# Patient Record
Sex: FEMALE | Race: WHITE | Employment: FULL TIME | URBAN - METROPOLITAN AREA
[De-identification: names, ages, dates, MRNs, and addresses within clinical notes are randomized per-mention and may not be internally consistent; named-entity substitution may affect disease eponyms.]

---

## 2017-03-01 ENCOUNTER — TELEPHONE (OUTPATIENT)
Dept: INTERNAL MEDICINE CLINIC | Age: 67
End: 2017-03-01

## 2017-03-07 ENCOUNTER — OFFICE VISIT (OUTPATIENT)
Dept: INTERNAL MEDICINE CLINIC | Age: 67
End: 2017-03-07

## 2017-03-07 VITALS
DIASTOLIC BLOOD PRESSURE: 78 MMHG | HEART RATE: 75 BPM | RESPIRATION RATE: 16 BRPM | HEIGHT: 66 IN | OXYGEN SATURATION: 98 % | SYSTOLIC BLOOD PRESSURE: 116 MMHG | BODY MASS INDEX: 32.78 KG/M2 | WEIGHT: 204 LBS

## 2017-03-07 DIAGNOSIS — E78.2 MIXED HYPERLIPIDEMIA: Chronic | ICD-10-CM

## 2017-03-07 DIAGNOSIS — I10 ESSENTIAL HYPERTENSION: Chronic | ICD-10-CM

## 2017-03-07 PROCEDURE — 90732 PPSV23 VACC 2 YRS+ SUBQ/IM: CPT | Performed by: INTERNAL MEDICINE

## 2017-03-07 PROCEDURE — G0009 ADMIN PNEUMOCOCCAL VACCINE: HCPCS | Performed by: INTERNAL MEDICINE

## 2017-03-07 PROCEDURE — 99214 OFFICE O/P EST MOD 30 MIN: CPT | Performed by: INTERNAL MEDICINE

## 2017-03-07 RX ORDER — PRAVASTATIN SODIUM 40 MG
TABLET ORAL
Qty: 90 TABLET | Refills: 3 | Status: SHIPPED | OUTPATIENT
Start: 2017-03-07 | End: 2018-03-09 | Stop reason: SDUPTHER

## 2017-03-07 RX ORDER — LISINOPRIL AND HYDROCHLOROTHIAZIDE 20; 12.5 MG/1; MG/1
TABLET ORAL
Qty: 90 TABLET | Refills: 3 | Status: SHIPPED | OUTPATIENT
Start: 2017-03-07 | End: 2018-03-09 | Stop reason: SDUPTHER

## 2017-03-13 ENCOUNTER — TELEPHONE (OUTPATIENT)
Dept: INTERNAL MEDICINE CLINIC | Age: 67
End: 2017-03-13

## 2017-03-14 ENCOUNTER — NURSE ONLY (OUTPATIENT)
Dept: INTERNAL MEDICINE CLINIC | Age: 67
End: 2017-03-14

## 2017-03-14 DIAGNOSIS — E78.2 MIXED HYPERLIPIDEMIA: Primary | Chronic | ICD-10-CM

## 2017-03-14 DIAGNOSIS — I10 ESSENTIAL HYPERTENSION: Chronic | ICD-10-CM

## 2017-03-14 LAB
A/G RATIO: 1.8 (ref 1.1–2.2)
ALBUMIN SERPL-MCNC: 4.1 G/DL (ref 3.4–5)
ALP BLD-CCNC: 65 U/L (ref 40–129)
ALT SERPL-CCNC: 10 U/L (ref 10–40)
ANION GAP SERPL CALCULATED.3IONS-SCNC: 12 MMOL/L (ref 3–16)
AST SERPL-CCNC: 11 U/L (ref 15–37)
BASOPHILS ABSOLUTE: 0 K/UL (ref 0–0.2)
BASOPHILS RELATIVE PERCENT: 0.6 %
BILIRUB SERPL-MCNC: 0.5 MG/DL (ref 0–1)
BUN BLDV-MCNC: 15 MG/DL (ref 7–20)
CALCIUM SERPL-MCNC: 9.2 MG/DL (ref 8.3–10.6)
CHLORIDE BLD-SCNC: 102 MMOL/L (ref 99–110)
CHOLESTEROL, TOTAL: 192 MG/DL (ref 0–199)
CO2: 28 MMOL/L (ref 21–32)
CREAT SERPL-MCNC: 1 MG/DL (ref 0.6–1.2)
EOSINOPHILS ABSOLUTE: 0.2 K/UL (ref 0–0.6)
EOSINOPHILS RELATIVE PERCENT: 4.3 %
GFR AFRICAN AMERICAN: >60
GFR NON-AFRICAN AMERICAN: 55
GLOBULIN: 2.3 G/DL
GLUCOSE BLD-MCNC: 94 MG/DL (ref 70–99)
HCT VFR BLD CALC: 41.6 % (ref 36–48)
HDLC SERPL-MCNC: 64 MG/DL (ref 40–60)
HEMOGLOBIN: 13.5 G/DL (ref 12–16)
LDL CHOLESTEROL CALCULATED: 94 MG/DL
LYMPHOCYTES ABSOLUTE: 1.3 K/UL (ref 1–5.1)
LYMPHOCYTES RELATIVE PERCENT: 35.5 %
MCH RBC QN AUTO: 31.6 PG (ref 26–34)
MCHC RBC AUTO-ENTMCNC: 32.6 G/DL (ref 31–36)
MCV RBC AUTO: 96.9 FL (ref 80–100)
MONOCYTES ABSOLUTE: 0.3 K/UL (ref 0–1.3)
MONOCYTES RELATIVE PERCENT: 8.3 %
NEUTROPHILS ABSOLUTE: 1.9 K/UL (ref 1.7–7.7)
NEUTROPHILS RELATIVE PERCENT: 51.3 %
PDW BLD-RTO: 13 % (ref 12.4–15.4)
PLATELET # BLD: 273 K/UL (ref 135–450)
PMV BLD AUTO: 8.5 FL (ref 5–10.5)
POTASSIUM SERPL-SCNC: 4.3 MMOL/L (ref 3.5–5.1)
RBC # BLD: 4.29 M/UL (ref 4–5.2)
SODIUM BLD-SCNC: 142 MMOL/L (ref 136–145)
TOTAL PROTEIN: 6.4 G/DL (ref 6.4–8.2)
TRIGL SERPL-MCNC: 169 MG/DL (ref 0–150)
TSH REFLEX: 1.59 UIU/ML (ref 0.27–4.2)
VLDLC SERPL CALC-MCNC: 34 MG/DL
WBC # BLD: 3.7 K/UL (ref 4–11)

## 2017-03-14 PROCEDURE — 36415 COLL VENOUS BLD VENIPUNCTURE: CPT | Performed by: INTERNAL MEDICINE

## 2017-12-11 ENCOUNTER — OFFICE VISIT (OUTPATIENT)
Dept: INTERNAL MEDICINE CLINIC | Age: 67
End: 2017-12-11

## 2017-12-11 VITALS — TEMPERATURE: 98.1 F | BODY MASS INDEX: 32.78 KG/M2 | RESPIRATION RATE: 16 BRPM | WEIGHT: 204 LBS | HEIGHT: 66 IN

## 2017-12-11 DIAGNOSIS — J20.9 BRONCHITIS, ACUTE, WITH BRONCHOSPASM: Primary | ICD-10-CM

## 2017-12-11 PROCEDURE — G8484 FLU IMMUNIZE NO ADMIN: HCPCS | Performed by: INTERNAL MEDICINE

## 2017-12-11 PROCEDURE — 99214 OFFICE O/P EST MOD 30 MIN: CPT | Performed by: INTERNAL MEDICINE

## 2017-12-11 PROCEDURE — G8427 DOCREV CUR MEDS BY ELIG CLIN: HCPCS | Performed by: INTERNAL MEDICINE

## 2017-12-11 PROCEDURE — 3014F SCREEN MAMMO DOC REV: CPT | Performed by: INTERNAL MEDICINE

## 2017-12-11 PROCEDURE — 3017F COLORECTAL CA SCREEN DOC REV: CPT | Performed by: INTERNAL MEDICINE

## 2017-12-11 PROCEDURE — 4040F PNEUMOC VAC/ADMIN/RCVD: CPT | Performed by: INTERNAL MEDICINE

## 2017-12-11 PROCEDURE — G8417 CALC BMI ABV UP PARAM F/U: HCPCS | Performed by: INTERNAL MEDICINE

## 2017-12-11 PROCEDURE — G8400 PT W/DXA NO RESULTS DOC: HCPCS | Performed by: INTERNAL MEDICINE

## 2017-12-11 PROCEDURE — 1123F ACP DISCUSS/DSCN MKR DOCD: CPT | Performed by: INTERNAL MEDICINE

## 2017-12-11 PROCEDURE — 1036F TOBACCO NON-USER: CPT | Performed by: INTERNAL MEDICINE

## 2017-12-11 PROCEDURE — 1090F PRES/ABSN URINE INCON ASSESS: CPT | Performed by: INTERNAL MEDICINE

## 2017-12-11 RX ORDER — METHYLPREDNISOLONE 4 MG/1
TABLET ORAL
Qty: 1 KIT | Refills: 0 | Status: SHIPPED | OUTPATIENT
Start: 2017-12-11 | End: 2017-12-17

## 2017-12-11 RX ORDER — GUAIFENESIN AND PSEUDOEPHEDRINE HCL 1200; 120 MG/1; MG/1
TABLET, EXTENDED RELEASE ORAL
Qty: 20 TABLET | Refills: 0 | Status: SHIPPED | OUTPATIENT
Start: 2017-12-11 | End: 2019-03-22

## 2017-12-11 RX ORDER — AZITHROMYCIN 250 MG/1
TABLET, FILM COATED ORAL
Qty: 1 PACKET | Refills: 0 | Status: SHIPPED | OUTPATIENT
Start: 2017-12-11 | End: 2017-12-21

## 2017-12-11 ASSESSMENT — PATIENT HEALTH QUESTIONNAIRE - PHQ9
1. LITTLE INTEREST OR PLEASURE IN DOING THINGS: 0
2. FEELING DOWN, DEPRESSED OR HOPELESS: 0
SUM OF ALL RESPONSES TO PHQ QUESTIONS 1-9: 0
SUM OF ALL RESPONSES TO PHQ9 QUESTIONS 1 & 2: 0

## 2017-12-11 NOTE — PROGRESS NOTES
Subjective  Patient complains of deep cough, congestion and phlegm production for the last several days. Also has had shortness of breath and wheezing. Symptoms get worse in the morning and at night and get better as the day progresses. The illness shows no sign of improvement. Complains of chest pressure and pain during a coughing episode. Has had fever and chills. No chest pain. Very tired and fatigued. Has had body aches. No nausea or emesis. No pedal edema. No dizziness. No acid reflux. Denies sore throat or otalgias. Tried over the counter medications with not much improvement. Has also tried inhalers with some help. Patient has been exposed to some people at work/home with similar symptoms. Objective  Temp 98.1 °F (36.7 °C) (Oral)   Resp 16   Ht 5' 6\" (1.676 m)   Wt 204 lb (92.5 kg)   BMI 32.93 kg/m²    Patient  looks ill to a mild to moderate extent. Visibly short of breath. Has audible wheezes. has some sinus tenderness. Ears show tympanic membrane congestion. Pharynx shows some posterior congestion and some postnasal drainage. Has some mild tender neck lymphadenopathy. Examination of the lungs reveal mildly labored respirations. Also has diffuse wheezing and rhonchi. No crackles heard. no rub or fremitus noted. Breath sounds have lowered intensity and has a prolonged expiratory phase. Heart sounds are normal, regular rate and rhythm, no murmur, gallop or rub noted. Apical impulse is in the left fifth ICS and appears normal.    Assessment  Bronchitis  Bronchospasm  Plan  Will start antibiotics, a tapering course of steroids and inhalers. Will start bronchodilators. Also recommend that the patient start some otc decongestants. Asked the patient to consume a lot of fluids, avoid greasy foods and smoke. Allergies could be playing a role in patients symptoms. Trying otc allergy meds like claritin, allegra or zyrtec would also be an option. Call us if symptoms do not improve in 2-3 days.

## 2018-03-13 RX ORDER — PRAVASTATIN SODIUM 40 MG
TABLET ORAL
Qty: 90 TABLET | Refills: 3 | Status: SHIPPED | OUTPATIENT
Start: 2018-03-13 | End: 2019-03-22 | Stop reason: SDUPTHER

## 2018-03-13 RX ORDER — LISINOPRIL AND HYDROCHLOROTHIAZIDE 20; 12.5 MG/1; MG/1
TABLET ORAL
Qty: 90 TABLET | Refills: 3 | Status: SHIPPED | OUTPATIENT
Start: 2018-03-13 | End: 2019-03-22 | Stop reason: SDUPTHER

## 2019-03-12 RX ORDER — PRAVASTATIN SODIUM 40 MG
TABLET ORAL
Qty: 90 TABLET | Refills: 3 | OUTPATIENT
Start: 2019-03-12

## 2019-03-12 RX ORDER — LISINOPRIL AND HYDROCHLOROTHIAZIDE 20; 12.5 MG/1; MG/1
TABLET ORAL
Qty: 90 TABLET | Refills: 3 | OUTPATIENT
Start: 2019-03-12

## 2019-03-15 RX ORDER — PRAVASTATIN SODIUM 40 MG
TABLET ORAL
Qty: 90 TABLET | OUTPATIENT
Start: 2019-03-15

## 2019-03-15 RX ORDER — LISINOPRIL AND HYDROCHLOROTHIAZIDE 20; 12.5 MG/1; MG/1
TABLET ORAL
Qty: 90 TABLET | OUTPATIENT
Start: 2019-03-15

## 2019-03-22 ENCOUNTER — OFFICE VISIT (OUTPATIENT)
Dept: INTERNAL MEDICINE CLINIC | Age: 69
End: 2019-03-22
Payer: MEDICARE

## 2019-03-22 VITALS
RESPIRATION RATE: 16 BRPM | HEIGHT: 66 IN | SYSTOLIC BLOOD PRESSURE: 122 MMHG | BODY MASS INDEX: 33.11 KG/M2 | WEIGHT: 206 LBS | HEART RATE: 68 BPM | DIASTOLIC BLOOD PRESSURE: 72 MMHG

## 2019-03-22 DIAGNOSIS — I10 ESSENTIAL HYPERTENSION: ICD-10-CM

## 2019-03-22 DIAGNOSIS — B00.1 FEVER BLISTER: ICD-10-CM

## 2019-03-22 DIAGNOSIS — Z85.038 HISTORY OF COLON CANCER: ICD-10-CM

## 2019-03-22 DIAGNOSIS — E78.2 MIXED HYPERLIPIDEMIA: ICD-10-CM

## 2019-03-22 DIAGNOSIS — Z76.89 ENCOUNTER TO ESTABLISH CARE: Primary | ICD-10-CM

## 2019-03-22 PROCEDURE — G8484 FLU IMMUNIZE NO ADMIN: HCPCS | Performed by: NURSE PRACTITIONER

## 2019-03-22 PROCEDURE — 1090F PRES/ABSN URINE INCON ASSESS: CPT | Performed by: NURSE PRACTITIONER

## 2019-03-22 PROCEDURE — 1101F PT FALLS ASSESS-DOCD LE1/YR: CPT | Performed by: NURSE PRACTITIONER

## 2019-03-22 PROCEDURE — 4040F PNEUMOC VAC/ADMIN/RCVD: CPT | Performed by: NURSE PRACTITIONER

## 2019-03-22 PROCEDURE — G8400 PT W/DXA NO RESULTS DOC: HCPCS | Performed by: NURSE PRACTITIONER

## 2019-03-22 PROCEDURE — G8427 DOCREV CUR MEDS BY ELIG CLIN: HCPCS | Performed by: NURSE PRACTITIONER

## 2019-03-22 PROCEDURE — G8417 CALC BMI ABV UP PARAM F/U: HCPCS | Performed by: NURSE PRACTITIONER

## 2019-03-22 PROCEDURE — 99205 OFFICE O/P NEW HI 60 MIN: CPT | Performed by: NURSE PRACTITIONER

## 2019-03-22 PROCEDURE — 3017F COLORECTAL CA SCREEN DOC REV: CPT | Performed by: NURSE PRACTITIONER

## 2019-03-22 PROCEDURE — 1123F ACP DISCUSS/DSCN MKR DOCD: CPT | Performed by: NURSE PRACTITIONER

## 2019-03-22 PROCEDURE — 1036F TOBACCO NON-USER: CPT | Performed by: NURSE PRACTITIONER

## 2019-03-22 RX ORDER — PRAVASTATIN SODIUM 40 MG
TABLET ORAL
Qty: 90 TABLET | Refills: 3 | Status: SHIPPED | OUTPATIENT
Start: 2019-03-22 | End: 2020-06-04

## 2019-03-22 RX ORDER — VALACYCLOVIR HYDROCHLORIDE 1 G/1
1000 TABLET, FILM COATED ORAL 2 TIMES DAILY PRN
Qty: 14 TABLET | Refills: 0 | Status: SHIPPED | OUTPATIENT
Start: 2019-03-22 | End: 2019-03-22 | Stop reason: SDUPTHER

## 2019-03-22 RX ORDER — LISINOPRIL AND HYDROCHLOROTHIAZIDE 20; 12.5 MG/1; MG/1
TABLET ORAL
Qty: 90 TABLET | Refills: 3 | Status: SHIPPED | OUTPATIENT
Start: 2019-03-22 | End: 2020-06-04

## 2019-03-22 RX ORDER — VALACYCLOVIR HYDROCHLORIDE 1 G/1
1000 TABLET, FILM COATED ORAL 2 TIMES DAILY PRN
Qty: 14 TABLET | Refills: 0 | Status: SHIPPED | OUTPATIENT
Start: 2019-03-22 | End: 2019-03-29

## 2019-03-22 RX ORDER — LISINOPRIL AND HYDROCHLOROTHIAZIDE 20; 12.5 MG/1; MG/1
TABLET ORAL
Qty: 90 TABLET | Refills: 3 | Status: SHIPPED | OUTPATIENT
Start: 2019-03-22 | End: 2019-03-22 | Stop reason: SDUPTHER

## 2019-03-22 ASSESSMENT — ENCOUNTER SYMPTOMS
PHOTOPHOBIA: 0
EYE PAIN: 0
SORE THROAT: 0
SHORTNESS OF BREATH: 0
ABDOMINAL PAIN: 0
NAUSEA: 0
WHEEZING: 0
CHEST TIGHTNESS: 0
SINUS PAIN: 0
BACK PAIN: 0
TROUBLE SWALLOWING: 0
ORTHOPNEA: 0
COUGH: 0
CONSTIPATION: 0
VOMITING: 0
DIARRHEA: 0
SINUS PRESSURE: 0
BLURRED VISION: 0

## 2019-03-22 ASSESSMENT — PATIENT HEALTH QUESTIONNAIRE - PHQ9
1. LITTLE INTEREST OR PLEASURE IN DOING THINGS: 0
SUM OF ALL RESPONSES TO PHQ9 QUESTIONS 1 & 2: 0
2. FEELING DOWN, DEPRESSED OR HOPELESS: 0
SUM OF ALL RESPONSES TO PHQ QUESTIONS 1-9: 0
SUM OF ALL RESPONSES TO PHQ QUESTIONS 1-9: 0

## 2019-03-26 ENCOUNTER — NURSE ONLY (OUTPATIENT)
Dept: INTERNAL MEDICINE CLINIC | Age: 69
End: 2019-03-26
Payer: MEDICARE

## 2019-03-26 DIAGNOSIS — I10 ESSENTIAL HYPERTENSION: ICD-10-CM

## 2019-03-26 DIAGNOSIS — E78.2 MIXED HYPERLIPIDEMIA: ICD-10-CM

## 2019-03-26 LAB
A/G RATIO: 1.6 (ref 1.1–2.2)
ALBUMIN SERPL-MCNC: 4.1 G/DL (ref 3.4–5)
ALP BLD-CCNC: 57 U/L (ref 40–129)
ALT SERPL-CCNC: 14 U/L (ref 10–40)
ANION GAP SERPL CALCULATED.3IONS-SCNC: 10 MMOL/L (ref 3–16)
AST SERPL-CCNC: 14 U/L (ref 15–37)
BILIRUB SERPL-MCNC: 0.6 MG/DL (ref 0–1)
BUN BLDV-MCNC: 10 MG/DL (ref 7–20)
CALCIUM SERPL-MCNC: 9.4 MG/DL (ref 8.3–10.6)
CHLORIDE BLD-SCNC: 103 MMOL/L (ref 99–110)
CHOLESTEROL, TOTAL: 187 MG/DL (ref 0–199)
CO2: 29 MMOL/L (ref 21–32)
CREAT SERPL-MCNC: 0.8 MG/DL (ref 0.6–1.2)
GFR AFRICAN AMERICAN: >60
GFR NON-AFRICAN AMERICAN: >60
GLOBULIN: 2.5 G/DL
GLUCOSE BLD-MCNC: 100 MG/DL (ref 70–99)
HCT VFR BLD CALC: 40.7 % (ref 36–48)
HDLC SERPL-MCNC: 67 MG/DL (ref 40–60)
HEMOGLOBIN: 13.6 G/DL (ref 12–16)
LDL CHOLESTEROL CALCULATED: 95 MG/DL
MCH RBC QN AUTO: 32.3 PG (ref 26–34)
MCHC RBC AUTO-ENTMCNC: 33.3 G/DL (ref 31–36)
MCV RBC AUTO: 96.8 FL (ref 80–100)
PDW BLD-RTO: 13.5 % (ref 12.4–15.4)
PLATELET # BLD: 277 K/UL (ref 135–450)
PMV BLD AUTO: 8.2 FL (ref 5–10.5)
POTASSIUM SERPL-SCNC: 4.2 MMOL/L (ref 3.5–5.1)
RBC # BLD: 4.21 M/UL (ref 4–5.2)
SODIUM BLD-SCNC: 142 MMOL/L (ref 136–145)
TOTAL PROTEIN: 6.6 G/DL (ref 6.4–8.2)
TRIGL SERPL-MCNC: 125 MG/DL (ref 0–150)
TSH REFLEX FT4: 1.59 UIU/ML (ref 0.27–4.2)
VLDLC SERPL CALC-MCNC: 25 MG/DL
WBC # BLD: 3.7 K/UL (ref 4–11)

## 2019-03-26 PROCEDURE — 36415 COLL VENOUS BLD VENIPUNCTURE: CPT | Performed by: NURSE PRACTITIONER

## 2019-05-08 ENCOUNTER — OFFICE VISIT (OUTPATIENT)
Dept: INTERNAL MEDICINE CLINIC | Age: 69
End: 2019-05-08
Payer: MEDICARE

## 2019-05-08 VITALS
HEART RATE: 76 BPM | HEIGHT: 66 IN | RESPIRATION RATE: 12 BRPM | OXYGEN SATURATION: 100 % | SYSTOLIC BLOOD PRESSURE: 113 MMHG | TEMPERATURE: 98 F | DIASTOLIC BLOOD PRESSURE: 73 MMHG | BODY MASS INDEX: 32.47 KG/M2 | WEIGHT: 202 LBS

## 2019-05-08 DIAGNOSIS — S60.10XA CONTUSION OF NAIL BED OF FINGER, INITIAL ENCOUNTER: Primary | ICD-10-CM

## 2019-05-08 PROCEDURE — G8427 DOCREV CUR MEDS BY ELIG CLIN: HCPCS | Performed by: NURSE PRACTITIONER

## 2019-05-08 PROCEDURE — G8417 CALC BMI ABV UP PARAM F/U: HCPCS | Performed by: NURSE PRACTITIONER

## 2019-05-08 PROCEDURE — 99213 OFFICE O/P EST LOW 20 MIN: CPT | Performed by: NURSE PRACTITIONER

## 2019-05-08 PROCEDURE — 1123F ACP DISCUSS/DSCN MKR DOCD: CPT | Performed by: NURSE PRACTITIONER

## 2019-05-08 PROCEDURE — 4040F PNEUMOC VAC/ADMIN/RCVD: CPT | Performed by: NURSE PRACTITIONER

## 2019-05-08 PROCEDURE — 3017F COLORECTAL CA SCREEN DOC REV: CPT | Performed by: NURSE PRACTITIONER

## 2019-05-08 PROCEDURE — G8400 PT W/DXA NO RESULTS DOC: HCPCS | Performed by: NURSE PRACTITIONER

## 2019-05-08 PROCEDURE — 1090F PRES/ABSN URINE INCON ASSESS: CPT | Performed by: NURSE PRACTITIONER

## 2019-05-08 PROCEDURE — 1036F TOBACCO NON-USER: CPT | Performed by: NURSE PRACTITIONER

## 2019-05-08 ASSESSMENT — ENCOUNTER SYMPTOMS
COUGH: 0
CHEST TIGHTNESS: 0
COLOR CHANGE: 0
CHOKING: 0
SHORTNESS OF BREATH: 0

## 2019-05-08 NOTE — PROGRESS NOTES
BP Readings from Last 3 Encounters:   05/08/19 113/73   03/22/19 122/72   03/07/17 116/78     Wt Readings from Last 3 Encounters:   05/08/19 202 lb (91.6 kg)   03/22/19 206 lb (93.4 kg)   12/11/17 204 lb (92.5 kg)      Body mass index is 32.6 kg/m². No results found for this visit on 05/08/19. Lab Review   Nurse Only on 03/26/2019   Component Date Value    TSH Reflex FT4 03/26/2019 1.59     Sodium 03/26/2019 142     Potassium 03/26/2019 4.2     Chloride 03/26/2019 103     CO2 03/26/2019 29     Anion Gap 03/26/2019 10     Glucose 03/26/2019 100*    BUN 03/26/2019 10     CREATININE 03/26/2019 0.8     GFR Non- 03/26/2019 >60     GFR  03/26/2019 >60     Calcium 03/26/2019 9.4     Total Protein 03/26/2019 6.6     Alb 03/26/2019 4.1     Albumin/Globulin Ratio 03/26/2019 1.6     Total Bilirubin 03/26/2019 0.6     Alkaline Phosphatase 03/26/2019 57     ALT 03/26/2019 14     AST 03/26/2019 14*    Globulin 03/26/2019 2.5     WBC 03/26/2019 3.7*    RBC 03/26/2019 4.21     Hemoglobin 03/26/2019 13.6     Hematocrit 03/26/2019 40.7     MCV 03/26/2019 96.8     MCH 03/26/2019 32.3     MCHC 03/26/2019 33.3     RDW 03/26/2019 13.5     Platelets 52/94/2763 277     MPV 03/26/2019 8.2     Cholesterol, Total 03/26/2019 187     Triglycerides 03/26/2019 125     HDL 03/26/2019 67*    LDL Calculated 03/26/2019 95     VLDL Cholesterol Calcula* 03/26/2019 25          Physical Exam   Constitutional: She appears well-developed and well-nourished. Cardiovascular: Normal rate. Pulmonary/Chest: Effort normal and breath sounds normal.   Skin: Skin is warm and dry. No rash noted. No erythema. No pallor. Vertical ridges to middle finger, left hand. Non-tender, non-discolored         Assessment/Plan   Arsh Most was seen today for nail problem.   Diagnoses and all orders for this visit:    Contusion of nail bed of finger, initial encounter  -vertical ridge, left hand, middle finger  -CBC reviewed, WNL  -patient states \"improving\", instructed to monitor for drainage.   -suspect possible fungus related, however; patient states, Improving, therefore will monitor and hold off on creams at this time   -patient has f/u Derm appt 07/2019    All questions addressed and answered. Patient agrees with plan of care. No problem-specific Assessment & Plan notes found for this encounter. No follow-ups on file.

## 2019-11-05 ENCOUNTER — HOSPITAL ENCOUNTER (OUTPATIENT)
Dept: WOMENS IMAGING | Age: 69
Discharge: HOME | End: 2019-11-05
Payer: MEDICARE

## 2019-11-05 DIAGNOSIS — Z12.31 BREAST CANCER SCREENING BY MAMMOGRAM: ICD-10-CM

## 2019-11-05 PROCEDURE — 77080 DXA BONE DENSITY AXIAL: CPT

## 2020-06-04 RX ORDER — PRAVASTATIN SODIUM 40 MG
TABLET ORAL
Qty: 90 TABLET | Refills: 0 | Status: SHIPPED | OUTPATIENT
Start: 2020-06-04 | End: 2020-08-25 | Stop reason: SDUPTHER

## 2020-06-04 RX ORDER — LISINOPRIL AND HYDROCHLOROTHIAZIDE 20; 12.5 MG/1; MG/1
TABLET ORAL
Qty: 90 TABLET | Refills: 0 | Status: SHIPPED | OUTPATIENT
Start: 2020-06-04 | End: 2020-08-25 | Stop reason: SDUPTHER

## 2020-08-25 ENCOUNTER — OFFICE VISIT (OUTPATIENT)
Dept: INTERNAL MEDICINE CLINIC | Age: 70
End: 2020-08-25
Payer: MEDICARE

## 2020-08-25 VITALS
HEART RATE: 82 BPM | OXYGEN SATURATION: 98 % | BODY MASS INDEX: 33.31 KG/M2 | SYSTOLIC BLOOD PRESSURE: 114 MMHG | WEIGHT: 206.4 LBS | TEMPERATURE: 97.9 F | RESPIRATION RATE: 18 BRPM | DIASTOLIC BLOOD PRESSURE: 78 MMHG

## 2020-08-25 PROBLEM — E66.09 CLASS 1 OBESITY DUE TO EXCESS CALORIES WITHOUT SERIOUS COMORBIDITY WITH BODY MASS INDEX (BMI) OF 33.0 TO 33.9 IN ADULT: Status: ACTIVE | Noted: 2020-08-25

## 2020-08-25 PROBLEM — E66.811 CLASS 1 OBESITY DUE TO EXCESS CALORIES WITHOUT SERIOUS COMORBIDITY WITH BODY MASS INDEX (BMI) OF 33.0 TO 33.9 IN ADULT: Status: ACTIVE | Noted: 2020-08-25

## 2020-08-25 PROCEDURE — 99214 OFFICE O/P EST MOD 30 MIN: CPT | Performed by: INTERNAL MEDICINE

## 2020-08-25 PROCEDURE — 1090F PRES/ABSN URINE INCON ASSESS: CPT | Performed by: INTERNAL MEDICINE

## 2020-08-25 PROCEDURE — G8427 DOCREV CUR MEDS BY ELIG CLIN: HCPCS | Performed by: INTERNAL MEDICINE

## 2020-08-25 PROCEDURE — G8399 PT W/DXA RESULTS DOCUMENT: HCPCS | Performed by: INTERNAL MEDICINE

## 2020-08-25 PROCEDURE — 3017F COLORECTAL CA SCREEN DOC REV: CPT | Performed by: INTERNAL MEDICINE

## 2020-08-25 PROCEDURE — 1123F ACP DISCUSS/DSCN MKR DOCD: CPT | Performed by: INTERNAL MEDICINE

## 2020-08-25 PROCEDURE — G8417 CALC BMI ABV UP PARAM F/U: HCPCS | Performed by: INTERNAL MEDICINE

## 2020-08-25 PROCEDURE — 1036F TOBACCO NON-USER: CPT | Performed by: INTERNAL MEDICINE

## 2020-08-25 PROCEDURE — 4040F PNEUMOC VAC/ADMIN/RCVD: CPT | Performed by: INTERNAL MEDICINE

## 2020-08-25 RX ORDER — LISINOPRIL AND HYDROCHLOROTHIAZIDE 20; 12.5 MG/1; MG/1
TABLET ORAL
Qty: 90 TABLET | Refills: 3 | Status: SHIPPED | OUTPATIENT
Start: 2020-08-25 | End: 2021-03-23 | Stop reason: SDUPTHER

## 2020-08-25 RX ORDER — PRAVASTATIN SODIUM 40 MG
TABLET ORAL
Qty: 90 TABLET | Refills: 3 | Status: SHIPPED | OUTPATIENT
Start: 2020-08-25 | End: 2021-03-23 | Stop reason: SDUPTHER

## 2020-08-25 SDOH — ECONOMIC STABILITY: FOOD INSECURITY: WITHIN THE PAST 12 MONTHS, YOU WORRIED THAT YOUR FOOD WOULD RUN OUT BEFORE YOU GOT MONEY TO BUY MORE.: NEVER TRUE

## 2020-08-25 SDOH — ECONOMIC STABILITY: FOOD INSECURITY: WITHIN THE PAST 12 MONTHS, THE FOOD YOU BOUGHT JUST DIDN'T LAST AND YOU DIDN'T HAVE MONEY TO GET MORE.: NEVER TRUE

## 2020-08-25 SDOH — ECONOMIC STABILITY: INCOME INSECURITY: HOW HARD IS IT FOR YOU TO PAY FOR THE VERY BASICS LIKE FOOD, HOUSING, MEDICAL CARE, AND HEATING?: NOT HARD AT ALL

## 2020-08-25 SDOH — ECONOMIC STABILITY: TRANSPORTATION INSECURITY
IN THE PAST 12 MONTHS, HAS LACK OF TRANSPORTATION KEPT YOU FROM MEETINGS, WORK, OR FROM GETTING THINGS NEEDED FOR DAILY LIVING?: NO

## 2020-08-25 SDOH — ECONOMIC STABILITY: TRANSPORTATION INSECURITY
IN THE PAST 12 MONTHS, HAS THE LACK OF TRANSPORTATION KEPT YOU FROM MEDICAL APPOINTMENTS OR FROM GETTING MEDICATIONS?: NO

## 2020-08-25 ASSESSMENT — PATIENT HEALTH QUESTIONNAIRE - PHQ9
1. LITTLE INTEREST OR PLEASURE IN DOING THINGS: 0
SUM OF ALL RESPONSES TO PHQ QUESTIONS 1-9: 0
2. FEELING DOWN, DEPRESSED OR HOPELESS: 0
SUM OF ALL RESPONSES TO PHQ9 QUESTIONS 1 & 2: 0
SUM OF ALL RESPONSES TO PHQ QUESTIONS 1-9: 0

## 2020-08-25 ASSESSMENT — ENCOUNTER SYMPTOMS
DIARRHEA: 0
SINUS PAIN: 0
EYE DISCHARGE: 0
EYE PAIN: 0
WHEEZING: 0
NAUSEA: 0
BLOOD IN STOOL: 0
CHEST TIGHTNESS: 0
VOMITING: 0
BACK PAIN: 0
ABDOMINAL DISTENTION: 0
COUGH: 0
CONSTIPATION: 0
SHORTNESS OF BREATH: 0
ABDOMINAL PAIN: 0
PHOTOPHOBIA: 0
RHINORRHEA: 0

## 2020-08-25 NOTE — PROGRESS NOTES
Allie Rivera MD        Allergies   Allergen Reactions    Sulfa Antibiotics        Past Medical History:   Diagnosis Date    Colon cancer ([address] 10/22/2012    Colon polyp 10/22/2012    Essential hypertension 3/27/2015    HTN (hypertension) 3/27/2015    Hyperlipidemia 9/16/2011    Mixed hyperlipidemia 9/16/2011       Past Surgical History:   Procedure Laterality Date    APPENDECTOMY      COLONOSCOPY      2017       Social History     Tobacco Use    Smoking status: Never Smoker    Smokeless tobacco: Never Used   Substance Use Topics    Alcohol use: No    Drug use: No         History reviewed. No pertinent family history. Vitals:    08/25/20 1437   BP: 114/78   Site: Right Upper Arm   Position: Sitting   Cuff Size: Large Adult   Pulse: 82   Resp: 18   Temp: 97.9 °F (36.6 °C)   SpO2: 98%   Weight: 206 lb 6.4 oz (93.6 kg)       Estimated body mass index is 33.31 kg/m² as calculated from the following:    Height as of 5/8/19: 5' 6\" (1.676 m). Weight as of this encounter: 206 lb 6.4 oz (93.6 kg). Physical Exam  Vitals signs and nursing note reviewed. Constitutional:       Appearance: Normal appearance. She is obese. HENT:      Head: Normocephalic and atraumatic. Right Ear: Tympanic membrane normal.      Left Ear: Tympanic membrane and ear canal normal.      Nose: Nose normal.      Mouth/Throat:      Mouth: Mucous membranes are dry. Eyes:      Extraocular Movements: Extraocular movements intact. Neck:      Musculoskeletal: Normal range of motion and neck supple. Cardiovascular:      Rate and Rhythm: Normal rate and regular rhythm. Pulses: Normal pulses. Heart sounds: Normal heart sounds. Pulmonary:      Effort: Pulmonary effort is normal. No respiratory distress. Breath sounds: Normal breath sounds. No wheezing. Abdominal:      General: Bowel sounds are normal.      Palpations: Abdomen is soft. Genitourinary:     General: Normal vulva. Vagina: No vaginal discharge. Musculoskeletal: Normal range of motion. General: No swelling or tenderness. Comments: Trace edema   Skin:     General: Skin is warm and dry. Coloration: Skin is not jaundiced or pale. Findings: No rash. Neurological:      General: No focal deficit present. Mental Status: She is alert and oriented to person, place, and time. Sensory: No sensory deficit. Motor: No weakness. Psychiatric:         Mood and Affect: Mood normal.         Behavior: Behavior normal.         ASSESSMENT/PLAN:  1. Establishing care with new doctor, encounter for  Pt has not had an AWV yet due to the change in the PCP and the COVID -19 situation. Will schedule for one    2. Mixed hyperlipidemia    - Lipid, Fasting; Future    3. Essential hypertension  Will continue on the lisinopril/HCTZ same dose  - BASIC METABOLIC PANEL; Future    4. Class 1 obesity due to excess calories without serious comorbidity with body mass index (BMI) of 33.0 to 33.9 in adult  counseled regarding wt loss    5. Encounter for screening for diabetes mellitus  Due to obesity will screen for DM as well  - HEMOGLOBIN A1C; Future      Orders Placed This Encounter   Medications    lisinopril-hydroCHLOROthiazide (PRINZIDE;ZESTORETIC) 20-12.5 MG per tablet     Sig: Take 1 tablet daily by mouth     Dispense:  90 tablet     Refill:  3    pravastatin (PRAVACHOL) 40 MG tablet     Sig: Take 1 tablet daily by mouth     Dispense:  90 tablet     Refill:  3       Return in about 4 weeks (around 9/22/2020) for AWV.

## 2020-09-22 ENCOUNTER — OFFICE VISIT (OUTPATIENT)
Dept: INTERNAL MEDICINE CLINIC | Age: 70
End: 2020-09-22
Payer: MEDICARE

## 2020-09-22 VITALS
BODY MASS INDEX: 32.85 KG/M2 | WEIGHT: 204.4 LBS | TEMPERATURE: 98.4 F | HEART RATE: 74 BPM | HEIGHT: 66 IN | OXYGEN SATURATION: 97 % | RESPIRATION RATE: 16 BRPM | DIASTOLIC BLOOD PRESSURE: 76 MMHG | SYSTOLIC BLOOD PRESSURE: 112 MMHG

## 2020-09-22 LAB
ANION GAP SERPL CALCULATED.3IONS-SCNC: 11 MMOL/L (ref 3–16)
BASOPHILS ABSOLUTE: 0 K/UL (ref 0–0.2)
BASOPHILS RELATIVE PERCENT: 1.2 %
BUN BLDV-MCNC: 12 MG/DL (ref 7–20)
CALCIUM SERPL-MCNC: 9.3 MG/DL (ref 8.3–10.6)
CHLORIDE BLD-SCNC: 104 MMOL/L (ref 99–110)
CHOLESTEROL, FASTING: 187 MG/DL (ref 0–199)
CO2: 26 MMOL/L (ref 21–32)
CREAT SERPL-MCNC: 0.8 MG/DL (ref 0.6–1.2)
EOSINOPHILS ABSOLUTE: 0.3 K/UL (ref 0–0.6)
EOSINOPHILS RELATIVE PERCENT: 7.3 %
ESTIMATED AVERAGE GLUCOSE: 122.6 MG/DL
GFR AFRICAN AMERICAN: >60
GFR NON-AFRICAN AMERICAN: >60
GLUCOSE BLD-MCNC: 98 MG/DL (ref 70–99)
HBA1C MFR BLD: 5.9 %
HCT VFR BLD CALC: 40.7 % (ref 36–48)
HDLC SERPL-MCNC: 61 MG/DL (ref 40–60)
HEMOGLOBIN: 13.8 G/DL (ref 12–16)
LDL CHOLESTEROL CALCULATED: 89 MG/DL
LYMPHOCYTES ABSOLUTE: 1.1 K/UL (ref 1–5.1)
LYMPHOCYTES RELATIVE PERCENT: 30 %
MCH RBC QN AUTO: 32.3 PG (ref 26–34)
MCHC RBC AUTO-ENTMCNC: 33.8 G/DL (ref 31–36)
MCV RBC AUTO: 95.7 FL (ref 80–100)
MONOCYTES ABSOLUTE: 0.3 K/UL (ref 0–1.3)
MONOCYTES RELATIVE PERCENT: 7.4 %
NEUTROPHILS ABSOLUTE: 1.9 K/UL (ref 1.7–7.7)
NEUTROPHILS RELATIVE PERCENT: 54.1 %
PDW BLD-RTO: 12.9 % (ref 12.4–15.4)
PLATELET # BLD: 256 K/UL (ref 135–450)
PMV BLD AUTO: 8 FL (ref 5–10.5)
POTASSIUM SERPL-SCNC: 3.9 MMOL/L (ref 3.5–5.1)
RBC # BLD: 4.26 M/UL (ref 4–5.2)
SODIUM BLD-SCNC: 141 MMOL/L (ref 136–145)
TRIGLYCERIDE, FASTING: 185 MG/DL (ref 0–150)
TSH REFLEX FT4: 1.03 UIU/ML (ref 0.27–4.2)
VLDLC SERPL CALC-MCNC: 37 MG/DL
WBC # BLD: 3.6 K/UL (ref 4–11)

## 2020-09-22 PROCEDURE — 1036F TOBACCO NON-USER: CPT | Performed by: INTERNAL MEDICINE

## 2020-09-22 PROCEDURE — 36415 COLL VENOUS BLD VENIPUNCTURE: CPT | Performed by: INTERNAL MEDICINE

## 2020-09-22 PROCEDURE — 1090F PRES/ABSN URINE INCON ASSESS: CPT | Performed by: INTERNAL MEDICINE

## 2020-09-22 PROCEDURE — 99213 OFFICE O/P EST LOW 20 MIN: CPT | Performed by: INTERNAL MEDICINE

## 2020-09-22 PROCEDURE — 4040F PNEUMOC VAC/ADMIN/RCVD: CPT | Performed by: INTERNAL MEDICINE

## 2020-09-22 PROCEDURE — 3017F COLORECTAL CA SCREEN DOC REV: CPT | Performed by: INTERNAL MEDICINE

## 2020-09-22 PROCEDURE — G8427 DOCREV CUR MEDS BY ELIG CLIN: HCPCS | Performed by: INTERNAL MEDICINE

## 2020-09-22 PROCEDURE — G8417 CALC BMI ABV UP PARAM F/U: HCPCS | Performed by: INTERNAL MEDICINE

## 2020-09-22 PROCEDURE — G8399 PT W/DXA RESULTS DOCUMENT: HCPCS | Performed by: INTERNAL MEDICINE

## 2020-09-22 PROCEDURE — 1123F ACP DISCUSS/DSCN MKR DOCD: CPT | Performed by: INTERNAL MEDICINE

## 2020-09-22 ASSESSMENT — ENCOUNTER SYMPTOMS
CHEST TIGHTNESS: 0
BACK PAIN: 0
NAUSEA: 0
COUGH: 0
DIARRHEA: 0
ABDOMINAL PAIN: 0
EYE PAIN: 0
SHORTNESS OF BREATH: 0
ABDOMINAL DISTENTION: 0
BLOOD IN STOOL: 0
WHEEZING: 0
EYE DISCHARGE: 0
RHINORRHEA: 0
SINUS PAIN: 0
VOMITING: 0
CONSTIPATION: 0
PHOTOPHOBIA: 0

## 2020-09-22 ASSESSMENT — LIFESTYLE VARIABLES: HOW OFTEN DO YOU HAVE A DRINK CONTAINING ALCOHOL: 0

## 2020-09-22 NOTE — PROGRESS NOTES
Musculoskeletal: Normal range of motion. General: No swelling. Comments: Mild tenderness on the lateral aspect of the hip. ROM WNL, no swelling or bruising   Skin:     General: Skin is warm and dry. Coloration: Skin is not jaundiced or pale. Findings: No bruising, erythema or rash. Neurological:      General: No focal deficit present. Mental Status: She is alert and oriented to person, place, and time. Sensory: No sensory deficit. Motor: No weakness. Psychiatric:         Mood and Affect: Mood normal.         Behavior: Behavior normal.         Thought Content: Thought content normal.         Judgment: Judgment normal.         ASSESSMENT/PLAN:  1. Routine general medical examination at a health care facility    2. Chronic pain of both hips new problem worsening  - XR HIP BILATERAL W AP PELVIS (2 VIEWS); Future  Prn ibuprofen    3. Screening for hyperlipidemia    - Lipid, Fasting; Future  - BASIC METABOLIC PANEL; Future  - CBC Auto Differential; Future    4. Screening for hypothyroidism    - TSH WITH REFLEX TO FT4; Future    5. Screening for diabetes mellitus    - HEMOGLOBIN A1C; Future      Return in 6 months (on 3/22/2021), or if symptoms worsen or fail to improve, for HTN, HLD.

## 2020-09-22 NOTE — PATIENT INSTRUCTIONS
Personalized Preventive Plan for Isis Sotor - 9/22/2020  Medicare offers a range of preventive health benefits. Some of the tests and screenings are paid in full while other may be subject to a deductible, co-insurance, and/or copay. Some of these benefits include a comprehensive review of your medical history including lifestyle, illnesses that may run in your family, and various assessments and screenings as appropriate. After reviewing your medical record and screening and assessments performed today your provider may have ordered immunizations, labs, imaging, and/or referrals for you. A list of these orders (if applicable) as well as your Preventive Care list are included within your After Visit Summary for your review. Other Preventive Recommendations:    · A preventive eye exam performed by an eye specialist is recommended every 1-2 years to screen for glaucoma; cataracts, macular degeneration, and other eye disorders. · A preventive dental visit is recommended every 6 months. · Try to get at least 150 minutes of exercise per week or 10,000 steps per day on a pedometer . · Order or download the FREE \"Exercise & Physical Activity: Your Everyday Guide\" from [employer]. Call 5-943.625.8742 or search [employer] online. · You need 6378-0343 mg of calcium and 8357-3747 IU of vitamin D per day. It is possible to meet your calcium requirement with diet alone, but a vitamin D supplement is usually necessary to meet this goal.  · When exposed to the sun, use a sunscreen that protects against both UVA and UVB radiation with an SPF of 30 or greater. Reapply every 2 to 3 hours or after sweating, drying off with a towel, or swimming. · Always wear a seat belt when traveling in a car. Always wear a helmet when riding a bicycle or motorcycle.

## 2020-09-22 NOTE — PROGRESS NOTES
Medicare Annual Wellness Visit  Name: Amada Her Date: 2020   MRN: <[id number]> Sex: Female   Age: 78 y.o. Ethnicity: Non-/Non    : 1950 Race: Yadi Roger is here for Medicare AWV    Screenings for behavioral, psychosocial and functional/safety risks, and cognitive dysfunction are all negative except as indicated below. These results, as well as other patient data from the [address] form, are documented in Flowsheets linked to this Encounter. 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 Does have issues with the hip pain    Allergies   Allergen Reactions    Sulfa Antibiotics          Prior to Visit Medications    Medication Sig Taking? Authorizing Provider   lisinopril-hydroCHLOROthiazide (PRINZIDE;ZESTORETIC) 20-12.5 MG per tablet Take 1 tablet daily by mouth Yes Nathan Armstrong MD   pravastatin (PRAVACHOL) 40 MG tablet Take 1 tablet daily by mouth Yes Nathan Armstrong MD         Past Medical History:   Diagnosis Date    Colon cancer ([address] 10/22/2012    Colon polyp 10/22/2012    Essential hypertension 3/27/2015    HTN (hypertension) 3/27/2015    Hyperlipidemia 2011    Mixed hyperlipidemia 2011       Past Surgical History:   Procedure Laterality Date    APPENDECTOMY      COLONOSCOPY      2017       History reviewed. No pertinent family history.     CareTeam (Including outside providers/suppliers regularly involved in providing care):   Patient Care Team:  Nathan Armstrong MD as PCP - FRANKLIN Palafox CNP as PCP - [facility] Empaneled Provider    Wt Readings from Last 3 Encounters:   20 204 lb 6.4 oz (92.7 kg)   20 206 lb 6.4 oz (93.6 kg)   19 202 lb (91.6 kg)     Vitals:    20 1006   BP: 112/76   Site: Left Upper Arm   Position: Sitting   Cuff Size: Large Adult   Pulse: 74   Resp: 16   Temp: 98.4 °F (36.9 °C)   SpO2: 97%   Weight: 204 lb 6.4 oz (92.7 kg)   Height: 5' 6\" (1.676 m)     Body mass index is 32.99 kg/m². Based upon direct observation of the patient, evaluation of cognition reveals recent and remote memory intact. General Appearance: alert and oriented to person, place and time, well developed and well- nourished, in no acute distress  Skin: warm and dry, no rash or erythema  Head: normocephalic and atraumatic  Eyes: pupils equal, round, and reactive to light, extraocular eye movements intact, conjunctivae normal  ENT: tympanic membrane, external ear and ear canal normal bilaterally, nose without deformity, nasal mucosa and turbinates normal without polyps  Neck: supple and non-tender without mass, no thyromegaly or thyroid nodules, no cervical lymphadenopathy  Pulmonary/Chest: clear to auscultation bilaterally- no wheezes, rales or rhonchi, normal air movement, no respiratory distress  Cardiovascular: normal rate, regular rhythm, normal S1 and S2, no murmurs, rubs, clicks, or gallops, distal pulses intact, no carotid bruits  Abdomen: soft, non-tender, non-distended, normal bowel sounds, no masses or organomegaly  Extremities: no cyanosis, clubbing or edema  Mild tenderness over the lateral aspect of the both hips  Musculoskeletal: normal range of motion, no joint swelling, deformity or tenderness  Neurologic: reflexes normal and symmetric, no cranial nerve deficit, gait, coordination and speech normal    Patient's complete Health Risk Assessment and screening values have been reviewed and are found in Flowsheets. The following problems were reviewed today and where indicated follow up appointments were made and/or referrals ordered. Positive Risk Factor Screenings with Interventions:     General Health:  General  In general, how would you say your health is?: Good  In the past 7 days, have you experienced any of the following?  New or Increased Pain, New or Increased Fatigue, Loneliness, Social Isolation, Stress or Anger?: None of These  Do you get the social and emotional support that you need?: Yes  Do you have a Living Will?: (!) No  General Health Risk Interventions:  · Pain issues: sent for x ray of b/l hip for ? OA  · No Living Will: additional information provided about the importance of living [place]    Health Habits/Nutrition:  Health Habits/Nutrition  Do you exercise for at least 20 minutes 2-3 times per week?: Yes  Have you lost any weight without trying in the past 3 months?: No  Do you eat fewer than 2 meals per day?: No  Have you seen a dentist within the past year?: Yes  Body mass index is 32.99 kg/m².   Health Habits/Nutrition Interventions:  · Inadequate physical activity:  patient is not ready to increase his/her physical activity level at this time  · Nutritional issues:  educational materials for healthy, well-balanced diet provided  · Dental exam overdue:  she has an appoitnment in place    Safety:  Safety  Do you have working smoke detectors?: Yes  Have all throw rugs been removed or fastened?: Yes  Do you have non-slip mats or surfaces in all bathtubs/showers?: (!) No  Do all of your stairways have a railing or banister?: Yes  Are your doorways, halls and stairs free of clutter?: Yes  Do you always fasten your seatbelt when you are in a car?: Yes  Safety Interventions:  · Home safety tips provided    Personalized Preventive Plan   Current Health Maintenance Status  Immunization History   Administered Date(s) Administered    Pneumococcal Conjugate 13-valent (Cutabns63) 04/10/2015    Pneumococcal Conjugate 7-valent (Prevnar7) 09/14/2015    Pneumococcal Polysaccharide ([id number]) 03/07/2017    Td, unspecified formulation 10/18/2010        Health Maintenance   Topic Date Due    Hepatitis C screen  1950    DTaP/Tdap/Td vaccine (1 - Tdap) 10/30/1968    Shingles Vaccine (1 of 2) 10/30/1999    Annual Wellness Visit (AWV)  05/29/2019    Lipid screen  03/26/2020    Potassium monitoring  03/26/2020    Creatinine monitoring  03/26/2020    Flu vaccine (1) 09/01/2020    Breast cancer screen  08/25/2022    Colon cancer screen colonoscopy  09/05/2024    Pneumococcal 65+ years Vaccine  Completed    DEXA (modify frequency per FRAX score)  Addressed    Hepatitis A vaccine  Aged Out    Hepatitis B vaccine  Aged Out    Hib vaccine  Aged Out    Meningococcal (ACWY) vaccine  Aged Out     Recommendations for [employer] Due: see orders and patient instructions/AVS.  . Recommended screening schedule for the next 5-10 years is provided to the patient in written form: see Patient Gustavo Cespedes was seen today for medicare aw. Diagnoses and all orders for this visit:    Routine general medical examination at a [facility] facility    Chronic pain of both hips  -     XR HIP BILATERAL W AP PELVIS (2 VIEWS); Future    Screening for hyperlipidemia  -     Lipid, Fasting; Future  -     BASIC METABOLIC PANEL;  Future  -     CBC Auto Differential; Future    Screening for hypothyroidism  -     TSH WITH REFLEX TO FT4; Future    Screening for diabetes mellitus  -     HEMOGLOBIN A1C; Future

## 2020-10-06 ENCOUNTER — HOSPITAL ENCOUNTER (OUTPATIENT)
Age: 70
Discharge: HOME | End: 2020-10-06
Payer: MEDICARE

## 2020-10-06 ENCOUNTER — HOSPITAL ENCOUNTER (OUTPATIENT)
Dept: GENERAL RADIOLOGY | Age: 70
Discharge: HOME | End: 2020-10-06
Payer: MEDICARE

## 2020-10-06 PROCEDURE — 73522 X-RAY EXAM HIPS BI 3-4 VIEWS: CPT

## 2020-10-14 ENCOUNTER — TELEPHONE (OUTPATIENT)
Dept: INTERNAL MEDICINE CLINIC | Age: 70
End: 2020-10-14

## 2020-10-14 NOTE — TELEPHONE ENCOUNTER
Spoke with Edmundo Means would like to notify us that she tested positive for covid. Was notified to quarantine. Monitor symptoms- take medication prn for fever. Encourage fluids. Go to ER if SOB occurs. Noted to call [facility] office if any other concerns. Edmundo Means states she feels fine just wanted to let us know.

## 2021-02-24 ENCOUNTER — IMMUNIZATION (OUTPATIENT)
Dept: PRIMARY CARE CLINIC | Age: 71
End: 2021-02-24
Payer: MEDICARE

## 2021-02-24 PROCEDURE — 91300 COVID-19, PFIZER VACCINE 30MCG/0.3ML DOSE: CPT | Performed by: FAMILY MEDICINE

## 2021-02-24 PROCEDURE — 0001A COVID-19, PFIZER VACCINE 30MCG/0.3ML DOSE: CPT | Performed by: FAMILY MEDICINE

## 2021-03-17 ENCOUNTER — IMMUNIZATION (OUTPATIENT)
Dept: PRIMARY CARE CLINIC | Age: 71
End: 2021-03-17
Payer: MEDICARE

## 2021-03-17 PROCEDURE — 91300 COVID-19, PFIZER VACCINE 30MCG/0.3ML DOSE: CPT | Performed by: FAMILY MEDICINE

## 2021-03-17 PROCEDURE — 0002A COVID-19, PFIZER VACCINE 30MCG/0.3ML DOSE: CPT | Performed by: FAMILY MEDICINE

## 2021-03-22 NOTE — PROGRESS NOTES
3/23/2021    Patrick Paniagua (:  1950) is a 69 y.o. female, here for evaluation of the following medical concerns:    Chief Complaint   Patient presents with    Hypertension       HPI  The pt is a 66YOF with PMH of HLD, HTN and hx of colon cancer who comes in today for scheduled for colo in 2021 she had a ployp removed buit it was good otherwise reapt in 5 years  She works full time in the bank. She does not get chance to exercise due to her work schedule. She lives at home with her . Hypertension chronic/acute, improving /worsening/stable/waxing and waning  Compliance with the medications   Associated symptoms include   Pertinent negatives include no anxiety, blurred vision, chest pain, headaches, malaise/fatigue, neck pain, orthopnea, palpitations, PND, shortness of breath or sweats. Risk factors for coronary artery disease include dyslipidemia, family history, obesity, stress and sedentary lifestyle. The current treatment provides significant improvement. Compliance problems include exercise and diet. There is no history of chronic renal disease. Hyperlipidemia  This is a chronic problem. The problem is stable. The pt is compliant with the medications. Exacerbating diseases include obesity and diet. Factors aggravating his hyperlipidemia include fatty foods. Pertinent negatives include no chest pain, focal sensory loss, focal weakness, leg pain, myalgias or shortness of breath. Current antihyperlipidemic treatment includes exercise and diet change. Compliance problems include adherence to diet and adherence to exercise. Risk factors for coronary artery disease include family history, dyslipidemia, hypertension, obesity,  stress and a sedentary lifestyle. She is trying to eat more fruits and vegetable. She COIVD  and lost about 10 lbs  Hisotry of colon cancer  Prediabetes  She is still not able to exercise a lot.  Hba1c is 5.9 in     Review of Systems   Constitutional: Negative for activity change, appetite change and fatigue. HENT: Negative for mouth sores, nosebleeds, rhinorrhea and sinus pain. Eyes: Negative for discharge and visual disturbance. Respiratory: Negative for cough, chest tightness and shortness of breath. Cardiovascular: Negative for chest pain, palpitations and leg swelling. Gastrointestinal: Negative for abdominal pain, blood in stool, constipation, diarrhea, nausea and vomiting. Musculoskeletal: Negative for back pain and gait problem. Skin: Negative for rash and wound. Neurological: Negative for dizziness, speech difficulty, weakness and numbness. Psychiatric/Behavioral: Negative for dysphoric mood, self-injury and suicidal ideas. The patient is not nervous/anxious. All other systems reviewed and are negative. Prior to Visit Medications    Medication Sig Taking? Authorizing Provider   pravastatin (PRAVACHOL) 40 MG tablet Take 1 tablet daily by mouth Yes Clive Roldan MD   lisinopril-hydroCHLOROthiazide (PRINZIDE;ZESTORETIC) 20-12.5 MG per tablet Take 1 tablet daily by mouth Yes Clive Roldan MD        Allergies   Allergen Reactions    Sulfa Antibiotics        Past Medical History:   Diagnosis Date    Colon cancer ([facility] [address] 10/22/2012    Colon polyp 10/22/2012    Essential hypertension 3/27/2015    HTN (hypertension) 3/27/2015    Hyperlipidemia 9/16/2011    Mixed hyperlipidemia 9/16/2011       Past Surgical History:   Procedure Laterality Date    APPENDECTOMY      COLONOSCOPY      2017       Social History     Tobacco Use    Smoking status: Never Smoker    Smokeless tobacco: Never Used   Substance Use Topics    Alcohol use: No    Drug use: No         No family history on file.     Vitals:    03/23/21 0900   BP: 108/70   Site: Left Upper Arm   Position: Sitting   Cuff Size: Medium Adult   Pulse: 67   Resp: 16   Temp: 97 °F (36.1 °C)   TempSrc: Temporal   SpO2: 96%   Weight: 199 lb (90.3 kg)   Height: 5' 6\" (1.676 m) Estimated body mass index is 32.12 kg/m² as calculated from the following:    Height as of this encounter: 5' 6\" (1.676 m). Weight as of this encounter: 199 lb (90.3 kg). Physical Exam  Vitals signs and nursing note reviewed. Constitutional:       General: She is not in acute distress. Appearance: Normal appearance. She is obese. HENT:      Head: Normocephalic and atraumatic. Right Ear: Tympanic membrane, ear canal and external ear normal.      Left Ear: Tympanic membrane, ear canal and external ear normal.      Nose: Nose normal.      Mouth/Throat:      Mouth: Mucous membranes are moist.   Eyes:      Extraocular Movements: Extraocular movements intact. Pupils: Pupils are equal, round, and reactive to light. Neck:      Musculoskeletal: Normal range of motion and neck supple. Cardiovascular:      Rate and Rhythm: Normal rate and regular rhythm. Pulses: Normal pulses. Heart sounds: Normal heart sounds. Pulmonary:      Effort: Pulmonary effort is normal. No respiratory distress. Breath sounds: Normal breath sounds. No wheezing. Abdominal:      General: Bowel sounds are normal. There is no distension. Palpations: Abdomen is soft. Musculoskeletal: Normal range of motion. General: No swelling or tenderness. Skin:     General: Skin is warm and dry. Coloration: Skin is not jaundiced or pale. Findings: No rash. Neurological:      General: No focal deficit present. Mental Status: She is alert and oriented to person, place, and time. Sensory: No sensory deficit. Motor: No weakness. Psychiatric:         Mood and Affect: Mood normal.         Behavior: Behavior normal.         Thought Content: Thought content normal.         ASSESSMENT/PLAN:  1. Essential hypertension  Continue current medications    2. Mixed hyperlipidemia  Continue pravastatin  3.  Class 1 obesity due to excess calories without serious comorbidity with body mass index (BMI) of 33.0 to 33.9 in adult  counseling    Orders Placed This Encounter   Medications    pravastatin (PRAVACHOL) 40 MG tablet     Sig: Take 1 tablet daily by mouth     Dispense:  90 tablet     Refill:  1    lisinopril-hydroCHLOROthiazide (PRINZIDE;ZESTORETIC) 20-12.5 MG per tablet     Sig: Take 1 tablet daily by mouth     Dispense:  90 tablet     Refill:  1       Return in about 6 months (around 9/23/2021) for AWV and chronic disease management.

## 2021-03-23 ENCOUNTER — OFFICE VISIT (OUTPATIENT)
Dept: INTERNAL MEDICINE CLINIC | Age: 71
End: 2021-03-23
Payer: MEDICARE

## 2021-03-23 VITALS
TEMPERATURE: 97 F | DIASTOLIC BLOOD PRESSURE: 70 MMHG | BODY MASS INDEX: 31.98 KG/M2 | HEIGHT: 66 IN | WEIGHT: 199 LBS | OXYGEN SATURATION: 96 % | SYSTOLIC BLOOD PRESSURE: 108 MMHG | HEART RATE: 67 BPM | RESPIRATION RATE: 16 BRPM

## 2021-03-23 DIAGNOSIS — E66.09 CLASS 1 OBESITY DUE TO EXCESS CALORIES WITHOUT SERIOUS COMORBIDITY WITH BODY MASS INDEX (BMI) OF 33.0 TO 33.9 IN ADULT: ICD-10-CM

## 2021-03-23 DIAGNOSIS — E78.2 MIXED HYPERLIPIDEMIA: Chronic | ICD-10-CM

## 2021-03-23 DIAGNOSIS — I10 ESSENTIAL HYPERTENSION: Primary | Chronic | ICD-10-CM

## 2021-03-23 PROCEDURE — G8484 FLU IMMUNIZE NO ADMIN: HCPCS | Performed by: INTERNAL MEDICINE

## 2021-03-23 PROCEDURE — G8417 CALC BMI ABV UP PARAM F/U: HCPCS | Performed by: INTERNAL MEDICINE

## 2021-03-23 PROCEDURE — 99214 OFFICE O/P EST MOD 30 MIN: CPT | Performed by: INTERNAL MEDICINE

## 2021-03-23 PROCEDURE — 1090F PRES/ABSN URINE INCON ASSESS: CPT | Performed by: INTERNAL MEDICINE

## 2021-03-23 PROCEDURE — G8427 DOCREV CUR MEDS BY ELIG CLIN: HCPCS | Performed by: INTERNAL MEDICINE

## 2021-03-23 PROCEDURE — 4040F PNEUMOC VAC/ADMIN/RCVD: CPT | Performed by: INTERNAL MEDICINE

## 2021-03-23 PROCEDURE — 1123F ACP DISCUSS/DSCN MKR DOCD: CPT | Performed by: INTERNAL MEDICINE

## 2021-03-23 PROCEDURE — 3017F COLORECTAL CA SCREEN DOC REV: CPT | Performed by: INTERNAL MEDICINE

## 2021-03-23 PROCEDURE — G8399 PT W/DXA RESULTS DOCUMENT: HCPCS | Performed by: INTERNAL MEDICINE

## 2021-03-23 PROCEDURE — 1036F TOBACCO NON-USER: CPT | Performed by: INTERNAL MEDICINE

## 2021-03-23 RX ORDER — LISINOPRIL AND HYDROCHLOROTHIAZIDE 20; 12.5 MG/1; MG/1
TABLET ORAL
Qty: 90 TABLET | Refills: 1 | Status: SHIPPED | OUTPATIENT
Start: 2021-03-23 | End: 2021-08-17 | Stop reason: SDUPTHER

## 2021-03-23 RX ORDER — PRAVASTATIN SODIUM 40 MG
TABLET ORAL
Qty: 90 TABLET | Refills: 1 | Status: SHIPPED | OUTPATIENT
Start: 2021-03-23 | End: 2021-08-17 | Stop reason: SDUPTHER

## 2021-03-23 ASSESSMENT — ENCOUNTER SYMPTOMS
VOMITING: 0
SINUS PAIN: 0
EYE DISCHARGE: 0
ABDOMINAL PAIN: 0
COUGH: 0
NAUSEA: 0
BLOOD IN STOOL: 0
CONSTIPATION: 0
CHEST TIGHTNESS: 0
DIARRHEA: 0
RHINORRHEA: 0
BACK PAIN: 0
SHORTNESS OF BREATH: 0

## 2021-03-23 ASSESSMENT — PATIENT HEALTH QUESTIONNAIRE - PHQ9
SUM OF ALL RESPONSES TO PHQ9 QUESTIONS 1 & 2: 0
SUM OF ALL RESPONSES TO PHQ QUESTIONS 1-9: 0

## 2021-08-17 ENCOUNTER — OFFICE VISIT (OUTPATIENT)
Dept: INTERNAL MEDICINE CLINIC | Age: 71
End: 2021-08-17
Payer: MEDICARE

## 2021-08-17 VITALS
BODY MASS INDEX: 31.39 KG/M2 | TEMPERATURE: 97.1 F | HEART RATE: 75 BPM | OXYGEN SATURATION: 96 % | WEIGHT: 200 LBS | DIASTOLIC BLOOD PRESSURE: 78 MMHG | HEIGHT: 67 IN | SYSTOLIC BLOOD PRESSURE: 120 MMHG

## 2021-08-17 DIAGNOSIS — Z01.818 PRE-OP EVALUATION: Primary | ICD-10-CM

## 2021-08-17 LAB
ANION GAP SERPL CALCULATED.3IONS-SCNC: 11 MMOL/L (ref 3–16)
BUN BLDV-MCNC: 13 MG/DL (ref 7–20)
CALCIUM SERPL-MCNC: 9.8 MG/DL (ref 8.3–10.6)
CHLORIDE BLD-SCNC: 99 MMOL/L (ref 99–110)
CO2: 27 MMOL/L (ref 21–32)
CREAT SERPL-MCNC: 0.9 MG/DL (ref 0.6–1.2)
GFR AFRICAN AMERICAN: >60
GFR NON-AFRICAN AMERICAN: >60
GLUCOSE BLD-MCNC: 86 MG/DL (ref 70–99)
POTASSIUM SERPL-SCNC: 3.9 MMOL/L (ref 3.5–5.1)
SODIUM BLD-SCNC: 137 MMOL/L (ref 136–145)

## 2021-08-17 PROCEDURE — 1090F PRES/ABSN URINE INCON ASSESS: CPT | Performed by: INTERNAL MEDICINE

## 2021-08-17 PROCEDURE — G8417 CALC BMI ABV UP PARAM F/U: HCPCS | Performed by: INTERNAL MEDICINE

## 2021-08-17 PROCEDURE — 99213 OFFICE O/P EST LOW 20 MIN: CPT | Performed by: INTERNAL MEDICINE

## 2021-08-17 PROCEDURE — 36415 COLL VENOUS BLD VENIPUNCTURE: CPT | Performed by: INTERNAL MEDICINE

## 2021-08-17 PROCEDURE — 93000 ELECTROCARDIOGRAM COMPLETE: CPT | Performed by: INTERNAL MEDICINE

## 2021-08-17 PROCEDURE — G8427 DOCREV CUR MEDS BY ELIG CLIN: HCPCS | Performed by: INTERNAL MEDICINE

## 2021-08-17 RX ORDER — LISINOPRIL AND HYDROCHLOROTHIAZIDE 20; 12.5 MG/1; MG/1
TABLET ORAL
Qty: 90 TABLET | Refills: 1 | Status: SHIPPED | OUTPATIENT
Start: 2021-08-17 | End: 2022-03-08 | Stop reason: SDUPTHER

## 2021-08-17 RX ORDER — VALACYCLOVIR HYDROCHLORIDE 1 G/1
1 TABLET, FILM COATED ORAL 2 TIMES DAILY
Qty: 14 TABLET | Refills: 1 | Status: SHIPPED | OUTPATIENT
Start: 2021-08-17 | End: 2022-03-08 | Stop reason: SDUPTHER

## 2021-08-17 RX ORDER — IBUPROFEN 600 MG/1
600 TABLET ORAL EVERY 6 HOURS PRN
Status: ON HOLD | COMMUNITY
End: 2022-08-17 | Stop reason: SDUPTHER

## 2021-08-17 RX ORDER — PRAVASTATIN SODIUM 40 MG
TABLET ORAL
Qty: 90 TABLET | Refills: 1 | Status: SHIPPED | OUTPATIENT
Start: 2021-08-17 | End: 2022-03-08 | Stop reason: SDUPTHER

## 2021-08-17 RX ORDER — VALACYCLOVIR HYDROCHLORIDE 1 G/1
1 TABLET, FILM COATED ORAL DAILY
COMMUNITY
Start: 2019-03-21 | End: 2021-08-17 | Stop reason: SDUPTHER

## 2021-08-17 NOTE — PROGRESS NOTES
Pre-operative History and Physical    HPI:  The pt is a 69 y.o.  female with past medical history of hypertension and hyperlipidemia who presents to the office today for a preoperative clearance for right knee surgery scheduled for September 2, 2021 by DR. Tiburcio Tirado at [facility]. [facility] orthopedics  Patient denies any chest pain and shortness of breath when climbing up 2 flights of stairs or when walking around the block                                Hx of MI                                 N                                             Hx of stroke                        N                Hx of DM on insulin                N     Hx of CKD creatinine >2.0 N  CHF compensated                 N  High risk surgery                    N    Functional capacity can climb up a flights of stairs or walk around a block at quick pace or climb up a hill or heavy household acitvity  METs  = or > than 4   Any prior adverse reaction to anesthesia in person or in the family : N    Past Medical History:   Diagnosis Date    Colon cancer ([facility] [address] 10/22/2012    Colon polyp 10/22/2012    Essential hypertension 3/27/2015    HTN (hypertension) 3/27/2015    Hyperlipidemia 9/16/2011    Mixed hyperlipidemia 9/16/2011      Past Surgical History:   Procedure Laterality Date    APPENDECTOMY      COLONOSCOPY      2017      No family history on file.    Social History     Tobacco Use    Smoking status: Never Smoker    Smokeless tobacco: Never Used   Vaping Use    Vaping Use: Never used   Substance Use Topics    Alcohol use: No    Drug use: No     Current Outpatient Medications   Medication Sig Dispense Refill    valACYclovir (VALTREX) 1 g tablet Take 1 tablet by mouth 2 times daily 14 tablet 1    pravastatin (PRAVACHOL) 40 MG tablet Take 1 tablet daily by mouth 90 tablet 1    lisinopril-hydroCHLOROthiazide (PRINZIDE;ZESTORETIC) 20-12.5 MG per tablet Take 1 tablet daily by mouth 90 tablet 1    ibuprofen (ADVIL;MOTRIN) 600 MG tablet Take 600 mg by mouth every 6 hours as needed       No current facility-administered medications for this visit. Allergies   Allergen Reactions    Sulfa Antibiotics      Review Of Systems:  Constitutional: denies fevers, fatigue, wt loss  HEENT: denies fascial pain, nasal congestions, sore throat, visual blurring and hearing loss  Respiratory: denies SOB, GUERRA, cough  Cardiovascular: denies exertional chest pain/ pressure  Gastrointestinal: denies nausea/vomiting, denies reflux, denies diarrhea  Genitourinary: denies urinary frequency , incontinence, burning  Musculoskeletal: denies recent fall, back pain  Neurological: denies seizure, migraines, syncope  Endocrine: denies thyroid disorder  Psychiatric: denies anxiety depression    Physical Examination:  Vitals:    08/17/21 1415   BP: 120/78   Pulse: 75   Temp: 97.1 °F (36.2 °C)   SpO2: 96%     Constitutional: appear comfortable, no acute distress, calm cooperative, normal built  HEENT: Normocephalic without any obvious abnormality, no thyromegaly  CVS:S1 S2 regular rate and rhythm,no murmurs , gallop and rubs  Lungs: Clear to auscultation bilateral lung fields  Abdomen: soft, non tender,, non distended, Bowel sounds normal, no rigidity and guarding  Pelvic and genital : exam deferred  Extremities: no edema  Skin: no rashes  Neurological: alert, oriented x 3, grossly unremarkable  Psych: calm, good mood      Basic metabolic panel was done in September last year and was normal.  It will be repeated again today  EKG: shows NSR no acute ischemic changes  ECHO : Not indicated  Stress test: Not indicated    Assessment and Plan:  Based on the RCRI of  0-1 her risk of major cardiovacular complications is ( 0 = 3.9%, 1= 1.0%, 2= 2.4%, 3 or more =5.4%   for this intermediate risk non cardiac surgery. Can proceed to surgery without any further cardiac testing as it is unlikely to change the management.   Please take 1/2 tablet  of your blood pressure the morning of surgery

## 2022-01-06 ENCOUNTER — TELEPHONE (OUTPATIENT)
Dept: ORTHOPEDIC SURGERY | Age: 72
End: 2022-01-06

## 2022-01-06 NOTE — TELEPHONE ENCOUNTER
Attempted to contact patient to inform them about the [address] [place] [address] joint pain program. Patient can call [phone] to find out more information or to schedule an appointment with a joint pain orthopedic specialist.

## 2022-02-15 ENCOUNTER — OFFICE VISIT (OUTPATIENT)
Dept: INTERNAL MEDICINE CLINIC | Age: 72
End: 2022-02-15
Payer: MEDICARE

## 2022-02-15 VITALS
SYSTOLIC BLOOD PRESSURE: 110 MMHG | DIASTOLIC BLOOD PRESSURE: 62 MMHG | HEIGHT: 67 IN | HEART RATE: 72 BPM | BODY MASS INDEX: 31.11 KG/M2 | RESPIRATION RATE: 17 BRPM | WEIGHT: 198.2 LBS | OXYGEN SATURATION: 98 %

## 2022-02-15 DIAGNOSIS — Z85.038 HISTORY OF COLON CANCER, STAGE I: ICD-10-CM

## 2022-02-15 DIAGNOSIS — Z11.59 NEED FOR HEPATITIS C SCREENING TEST: ICD-10-CM

## 2022-02-15 DIAGNOSIS — Z23 NEED FOR INFLUENZA VACCINATION: ICD-10-CM

## 2022-02-15 DIAGNOSIS — Z83.3 FAMILY HISTORY OF TYPE 2 DIABETES MELLITUS: ICD-10-CM

## 2022-02-15 DIAGNOSIS — I10 ESSENTIAL HYPERTENSION: ICD-10-CM

## 2022-02-15 DIAGNOSIS — E55.9 VITAMIN D DEFICIENCY: ICD-10-CM

## 2022-02-15 DIAGNOSIS — Z00.00 ENCOUNTER FOR PREVENTATIVE ADULT HEALTH CARE EXAMINATION: Primary | ICD-10-CM

## 2022-02-15 DIAGNOSIS — Z12.31 ENCOUNTER FOR SCREENING MAMMOGRAM FOR BREAST CANCER: ICD-10-CM

## 2022-02-15 DIAGNOSIS — E78.2 MIXED HYPERLIPIDEMIA: ICD-10-CM

## 2022-02-15 DIAGNOSIS — M15.8 OTHER OSTEOARTHRITIS INVOLVING MULTIPLE JOINTS: ICD-10-CM

## 2022-02-15 DIAGNOSIS — I65.23 CAROTID ARTERY CALCIFICATION, BILATERAL: ICD-10-CM

## 2022-02-15 DIAGNOSIS — M48.062 SPINAL STENOSIS OF LUMBAR REGION WITH NEUROGENIC CLAUDICATION: ICD-10-CM

## 2022-02-15 PROBLEM — M48.061 LUMBAR STENOSIS: Status: ACTIVE | Noted: 2022-02-02

## 2022-02-15 LAB
A/G RATIO: 1.8 (ref 1.1–2.2)
ALBUMIN SERPL-MCNC: 4.2 G/DL (ref 3.4–5)
ALP BLD-CCNC: 78 U/L (ref 40–129)
ALT SERPL-CCNC: 11 U/L (ref 10–40)
ANION GAP SERPL CALCULATED.3IONS-SCNC: 18 MMOL/L (ref 3–16)
AST SERPL-CCNC: 15 U/L (ref 15–37)
BASOPHILS ABSOLUTE: 0 K/UL (ref 0–0.2)
BASOPHILS RELATIVE PERCENT: 1.1 %
BILIRUB SERPL-MCNC: 0.4 MG/DL (ref 0–1)
BILIRUBIN URINE: NEGATIVE
BLOOD, URINE: NEGATIVE
BUN BLDV-MCNC: 10 MG/DL (ref 7–20)
CALCIUM SERPL-MCNC: 9.6 MG/DL (ref 8.3–10.6)
CHLORIDE BLD-SCNC: 104 MMOL/L (ref 99–110)
CHOLESTEROL, TOTAL: 180 MG/DL (ref 0–199)
CLARITY: CLEAR
CO2: 23 MMOL/L (ref 21–32)
COLOR: YELLOW
CREAT SERPL-MCNC: 0.9 MG/DL (ref 0.6–1.2)
EOSINOPHILS ABSOLUTE: 0.1 K/UL (ref 0–0.6)
EOSINOPHILS RELATIVE PERCENT: 4.3 %
GFR AFRICAN AMERICAN: >60
GFR NON-AFRICAN AMERICAN: >60
GLUCOSE BLD-MCNC: 84 MG/DL (ref 70–99)
GLUCOSE URINE: NEGATIVE MG/DL
HCT VFR BLD CALC: 38.9 % (ref 36–48)
HDLC SERPL-MCNC: 71 MG/DL (ref 40–60)
HEMOGLOBIN: 13.2 G/DL (ref 12–16)
HEPATITIS C ANTIBODY INTERPRETATION: NORMAL
KETONES, URINE: NEGATIVE MG/DL
LDL CHOLESTEROL CALCULATED: 90 MG/DL
LEUKOCYTE ESTERASE, URINE: NEGATIVE
LYMPHOCYTES ABSOLUTE: 0.9 K/UL (ref 1–5.1)
LYMPHOCYTES RELATIVE PERCENT: 25.8 %
MCH RBC QN AUTO: 32.9 PG (ref 26–34)
MCHC RBC AUTO-ENTMCNC: 33.9 G/DL (ref 31–36)
MCV RBC AUTO: 97 FL (ref 80–100)
MICROSCOPIC EXAMINATION: NORMAL
MONOCYTES ABSOLUTE: 0.3 K/UL (ref 0–1.3)
MONOCYTES RELATIVE PERCENT: 8.2 %
NEUTROPHILS ABSOLUTE: 2 K/UL (ref 1.7–7.7)
NEUTROPHILS RELATIVE PERCENT: 60.6 %
NITRITE, URINE: NEGATIVE
PDW BLD-RTO: 13.1 % (ref 12.4–15.4)
PH UA: 6.5 (ref 5–8)
PLATELET # BLD: 305 K/UL (ref 135–450)
PMV BLD AUTO: 8.4 FL (ref 5–10.5)
POTASSIUM SERPL-SCNC: 4.2 MMOL/L (ref 3.5–5.1)
PROTEIN UA: NEGATIVE MG/DL
RBC # BLD: 4.01 M/UL (ref 4–5.2)
SODIUM BLD-SCNC: 145 MMOL/L (ref 136–145)
SPECIFIC GRAVITY UA: 1.01 (ref 1–1.03)
TOTAL PROTEIN: 6.5 G/DL (ref 6.4–8.2)
TRIGL SERPL-MCNC: 97 MG/DL (ref 0–150)
TSH REFLEX FT4: 1 UIU/ML (ref 0.27–4.2)
URINE TYPE: NORMAL
UROBILINOGEN, URINE: 0.2 E.U./DL
VITAMIN D 25-HYDROXY: 22.7 NG/ML
VLDLC SERPL CALC-MCNC: 19 MG/DL
WBC # BLD: 3.3 K/UL (ref 4–11)

## 2022-02-15 PROCEDURE — 36415 COLL VENOUS BLD VENIPUNCTURE: CPT | Performed by: INTERNAL MEDICINE

## 2022-02-15 PROCEDURE — G8484 FLU IMMUNIZE NO ADMIN: HCPCS | Performed by: INTERNAL MEDICINE

## 2022-02-15 PROCEDURE — 90694 VACC AIIV4 NO PRSRV 0.5ML IM: CPT | Performed by: INTERNAL MEDICINE

## 2022-02-15 PROCEDURE — 99397 PER PM REEVAL EST PAT 65+ YR: CPT | Performed by: INTERNAL MEDICINE

## 2022-02-15 PROCEDURE — 81003 URINALYSIS AUTO W/O SCOPE: CPT | Performed by: INTERNAL MEDICINE

## 2022-02-15 PROCEDURE — G0008 ADMIN INFLUENZA VIRUS VAC: HCPCS | Performed by: INTERNAL MEDICINE

## 2022-02-15 RX ORDER — GABAPENTIN 100 MG/1
100 CAPSULE ORAL 3 TIMES DAILY
Qty: 90 CAPSULE | Refills: 5 | Status: SHIPPED | OUTPATIENT
Start: 2022-02-15 | End: 2022-03-08

## 2022-02-15 SDOH — ECONOMIC STABILITY: FOOD INSECURITY: WITHIN THE PAST 12 MONTHS, THE FOOD YOU BOUGHT JUST DIDN'T LAST AND YOU DIDN'T HAVE MONEY TO GET MORE.: NEVER TRUE

## 2022-02-15 SDOH — ECONOMIC STABILITY: FOOD INSECURITY: WITHIN THE PAST 12 MONTHS, YOU WORRIED THAT YOUR FOOD WOULD RUN OUT BEFORE YOU GOT MONEY TO BUY MORE.: NEVER TRUE

## 2022-02-15 ASSESSMENT — ENCOUNTER SYMPTOMS
ABDOMINAL PAIN: 0
RESPIRATORY NEGATIVE: 1
BACK PAIN: 1
EYES NEGATIVE: 1
BOWEL INCONTINENCE: 0
ALLERGIC/IMMUNOLOGIC NEGATIVE: 1

## 2022-02-15 ASSESSMENT — PATIENT HEALTH QUESTIONNAIRE - PHQ9
SUM OF ALL RESPONSES TO PHQ QUESTIONS 1-9: 0
SUM OF ALL RESPONSES TO PHQ QUESTIONS 1-9: 0
SUM OF ALL RESPONSES TO PHQ9 QUESTIONS 1 & 2: 0
SUM OF ALL RESPONSES TO PHQ QUESTIONS 1-9: 0
2. FEELING DOWN, DEPRESSED OR HOPELESS: 0
SUM OF ALL RESPONSES TO PHQ QUESTIONS 1-9: 0
1. LITTLE INTEREST OR PLEASURE IN DOING THINGS: 0

## 2022-02-15 ASSESSMENT — SOCIAL DETERMINANTS OF HEALTH (SDOH): HOW HARD IS IT FOR YOU TO PAY FOR THE VERY BASICS LIKE FOOD, HOUSING, MEDICAL CARE, AND HEATING?: NOT HARD AT ALL

## 2022-02-15 NOTE — PROGRESS NOTES
Megan Fried (:  1950) is a 66 y.o. female,Established patient, here for evaluation of the following chief complaint(s):  Established New Doctor and Discuss Labs         ASSESSMENT/PLAN:  1. Encounter for preventative adult health care examination patient had normal Pap smears after age 71. Normal bone density in 2019. Mammogram in 2029 lipid with family. Carotid endarterectomy and bypass Humulin and check thyroid vitamin D status (30 in  pending urinalysis CBC. 2. History of colon cancer, stage I  3. Need for influenza vaccination  -     INFLUENZA, QUADV, ADJUVANTED, 65 YRS =, IM, PF, PREFILL SYR, 0.5ML (FLUAD)  4. Need for hepatitis C screening test: Low risk patient currently being treated. -     Hepatitis C Antibody  5. Essential hypertension: Is controlled on current medication. Monitor renal function. Continue medication-     Comprehensive Metabolic Panel  -     CBC Auto Differential  -     Urinalysis  6. Mixed hyperlipidemia monitor lipid and need parotid K for me as LDL 70 or less. Carotid artery calcification seen on x-ray, will obtain a Doppler of the carotid arteries. No bruits on exam..The patient is at high risk for carotid artery stenosis. Family history of mother with carotid endarterectomy and hyperlipidemia and coronary artery bypass surgery.  -     TSH WITH REFLEX TO FT4  -     Lipid Panel  7. Vitamin D deficiency monitor level to make sure it is above 30 in the Covid-19  pandemic  -     Vitamin D 25 Hydroxy  8. Family history of type 2 diabetes mellitus, brother, need to screen for A. fib. -     Hemoglobin A1C  9. Spinal stenosis of lumbar region with neurogenic claudication , uncontrolled symptoms. No cauda equina symptoms. scheduled for epidural steroid next week via [facility]. Start gabapentin and titrate as tolerated. The [facility]-     gabapentin (NEURONTIN) 100 MG capsule; Take 1 capsule by mouth 3 times daily for 180 days.  Intended supply: 30 days, Disp-90 capsule, R-5Normal  10. Carotid artery calcification, uncontrolled, bilateral and positive family history of endarterectomy with mother, will further evaluate abnormal finding.  -     VL DUP CAROTID BILATERAL; Future  11. Other osteoarthritis involving multiple joints, uncontrolled, will further evaluate for causes  -     RHEUMATOID FACTOR  -     SEDIMENTATION RATE  -     Uric Acid      No follow-ups on file. Subjective   SUBJECTIVE/OBJECTIVE:    MRI January 2022          Transitional lumbar sacral anatomy.      Multilevel disc degenerative and facet arthritis with degenerative anterolisthesis at L3-4 resulting in severe central stenosis and moderate bilateral foraminal narrowing. Generative anterior listhesis L4-5 with mild central stenosis. Moderately severe central stenosis at L2-3 with mild bilateral foraminal narrowing peer moderate left and mild right foraminal narrowing L1-2.          Assessment       Impression: Impression:      The level degenerative disc disease facet arthropathy.      Anterior listhesis L3-4 L4-5       Multilevel Disc bulges, stenosis. IMPRESSION:        Transitional anatomy makes it difficult to number the vertebral levels. If clinically   indicated, a long-spine frontal scoliosis radiograph may be helpful to count ribs and vertebra   and to accurately number the thoracic and lumbar vertebral spine levels.     Mild levoscoliosis centered at mid lumbar spine.     Mild dynamic degenerative anterolisthesis L3-4.     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     Osteoarthritis lower lumbar facet joints.     Atherosclerotic vascular calcifications abdominal aorta which statistically can be associated   with coronary artery and carotid artery disease       Give patient an appointment to come in to follow-up on her back pain and discuss the calcium deposits in her abdominal aorta that can be associated with heart and carotid artery vascular disease.       Knee Pain   The incident occurred more than 1 week ago. There was no injury mechanism. The pain is present in the right knee. The quality of the pain is described as aching. The pain is at a severity of 5/10. The pain is moderate. The pain has been intermittent since onset. Associated symptoms include a loss of motion. Pertinent negatives include no inability to bear weight, loss of sensation, muscle weakness, numbness or tingling. She reports no foreign bodies present. The symptoms are aggravated by movement, weight bearing and palpation. Treatments tried: cartilage injection per orth and will get replacement. The treatment provided mild relief. Back Pain  This is a chronic problem. The current episode started more than 1 year ago. The problem occurs constantly. The problem has been gradually worsening since onset. The pain is present in the lumbar spine. The quality of the pain is described as aching. The pain radiates to the right thigh. The pain is at a severity of 7/10. The pain is severe. The pain is the same all the time. The symptoms are aggravated by bending, standing and twisting. Stiffness is present all day. Associated symptoms include leg pain. Pertinent negatives include no abdominal pain, bladder incontinence, bowel incontinence, chest pain, dysuria, fever, headaches, numbness, paresis, paresthesias, pelvic pain, perianal numbness, tingling, weakness or weight loss. Risk factors include history of cancer (colon cancer 20 years ago). She has tried nothing (scheduled for ANCELMO next week with Phil) for the symptoms. The treatment provided no relief.      Current Outpatient Medications on File Prior to Visit   Medication Sig Dispense Refill    ibuprofen (ADVIL;MOTRIN) 600 MG tablet Take 600 mg by mouth every 6 hours as needed      valACYclovir (VALTREX) 1 g tablet Take 1 tablet by mouth 2 times daily 14 tablet 1    pravastatin (PRAVACHOL) 40 MG tablet Take 1 tablet daily by mouth 90 tablet 1    lisinopril-hydroCHLOROthiazide (PRINZIDE;ZESTORETIC) 20-12.5 MG per tablet Take 1 tablet daily by mouth 90 tablet 1     No current facility-administered medications on file prior to visit. Family History   Problem Relation Age of Onset    Other Mother         carotid artery disease in 63\"    Coronary Art Dis Mother     Colon Cancer Father     Diabetes Brother      Patient Active Problem List   Diagnosis    Mixed hyperlipidemia    Colon polyp    Essential hypertension    Class 1 obesity due to excess calories without serious comorbidity with body mass index (BMI) of 33.0 to 33.9 in adult    History of colon cancer, stage I    Benign neoplasm of skin of face    Ganglion    Hypertrophic and atrophic condition of skin    Lumbar stenosis    Encounter for preventative adult health care examination    Need for influenza vaccination    Need for hepatitis C screening test    Vitamin D deficiency    Family history of type 2 diabetes mellitus    Carotid artery calcification, bilateral       Review of Systems   Constitutional: Negative. Negative for fever and weight loss. HENT: Negative. Eyes: Negative. Respiratory: Negative. Cardiovascular: Negative for chest pain. Hypertension mother with coronary artery bypass surgery and carotid endarterectomy history   Gastrointestinal: Negative for abdominal pain and bowel incontinence. Colon cancer of age 47 and last colonoscopy 2021 and due in 2026   Endocrine:        History of type 2 diabetes in her brother and hyperlipidemia   Genitourinary: Negative for bladder incontinence, dysuria and pelvic pain. No Pap smears until age 71 and told does not need any more   Musculoskeletal: Positive for back pain.         Moderate to severe lumbar spinal stenosis at multiple levels and sciatica of left leg    Advanced osteoarthritis of right knee with chronic swelling and deformity and awaiting knee replacement, history of bursitis left hip Allergic/Immunologic: Negative. Neurological: Negative for tingling, weakness, numbness, headaches and paresthesias. Sciatica of left leg   Hematological: Negative. Psychiatric/Behavioral: Negative. Objective   Physical Exam  Constitutional:       General: She is not in acute distress. Appearance: Normal appearance. She is not ill-appearing, toxic-appearing or diaphoretic. HENT:      Head: Normocephalic and atraumatic. Eyes:      Extraocular Movements: Extraocular movements intact. Conjunctiva/sclera: Conjunctivae normal.      Pupils: Pupils are equal, round, and reactive to light. Neck:      Vascular: No carotid bruit. Cardiovascular:      Rate and Rhythm: Normal rate and regular rhythm. Pulses: Normal pulses. Heart sounds: Normal heart sounds. Pulmonary:      Effort: Pulmonary effort is normal.      Breath sounds: Normal breath sounds. Abdominal:      General: Abdomen is flat. Bowel sounds are normal. There is no distension. Palpations: Abdomen is soft. There is no mass. Tenderness: There is no abdominal tenderness. There is no right CVA tenderness, left CVA tenderness, guarding or rebound. Hernia: No hernia is present. Musculoskeletal:         General: Swelling, tenderness and deformity present. Cervical back: Normal range of motion and neck supple. No rigidity or tenderness. Comments: Swelling and deformity of the right knee. Lymphadenopathy:      Cervical: No cervical adenopathy. Skin:     General: Skin is warm and dry. Neurological:      General: No focal deficit present. Mental Status: She is alert and oriented to person, place, and time. Psychiatric:         Mood and Affect: Mood normal.         Behavior: Behavior normal.         Thought Content: Thought content normal.         Judgment: Judgment normal.              An electronic signature was used to authenticate this note.     --Alicja Beasley MD

## 2022-02-16 LAB
ESTIMATED AVERAGE GLUCOSE: 114 MG/DL
HBA1C MFR BLD: 5.6 %

## 2022-02-17 DIAGNOSIS — D72.819 LEUKOPENIA, UNSPECIFIED TYPE: Primary | ICD-10-CM

## 2022-02-17 DIAGNOSIS — E55.9 VITAMIN D DEFICIENCY: Primary | ICD-10-CM

## 2022-02-17 LAB
RHEUMATOID FACTOR: <10 IU/ML
URIC ACID, SERUM: 4.6 MG/DL (ref 2.6–6)
VITAMIN B-12: 251 PG/ML (ref 211–911)

## 2022-02-17 RX ORDER — MELATONIN
2000 DAILY
Qty: 60 TABLET | Refills: 5 | COMMUNITY
Start: 2022-02-17 | End: 2022-03-08 | Stop reason: ALTCHOICE

## 2022-02-17 NOTE — RESULT ENCOUNTER NOTE
The A1c shows improvement. You have progressed from prediabetes to the normal nondiabetic range. Good job. Continue diet and exercise. The white blood cell count is mildly reduced. It has fluctuated in the past.  I want to repeat it in a month. Your vitamin D level is low. Normally the vitamin D level should be above 30. A level of 50 is ideal.  Vitamin D is important for healthy bones and a healthy immune system. Low vitamin D levels making you more prone to getting viral infections. Also a chronically low vitamin D level is a risk factor for breast and colon cancer. Purchase over-the-counter vitamin D3 and take 2000 units a day. The hepatitis C screening test is negative.   The cholesterol is normal.  The thyroid test is normal.

## 2022-02-17 NOTE — PROGRESS NOTES
Chronic neutropenia with mild increase. Will check b 12 level and repeat hematology consult to monitor.

## 2022-02-22 ENCOUNTER — HOSPITAL ENCOUNTER (OUTPATIENT)
Dept: VASCULAR LAB | Age: 72
Discharge: HOME | End: 2022-02-22
Payer: MEDICARE

## 2022-02-22 DIAGNOSIS — I65.23 CAROTID ARTERY CALCIFICATION, BILATERAL: ICD-10-CM

## 2022-02-22 PROCEDURE — 93880 EXTRACRANIAL BILAT STUDY: CPT

## 2022-02-23 DIAGNOSIS — D72.819 LEUKOPENIA, UNSPECIFIED TYPE: ICD-10-CM

## 2022-02-23 DIAGNOSIS — E53.8 LOW VITAMIN B12 LEVEL: Primary | ICD-10-CM

## 2022-02-23 DIAGNOSIS — M15.8 OTHER OSTEOARTHRITIS INVOLVING MULTIPLE JOINTS: ICD-10-CM

## 2022-03-08 ENCOUNTER — OFFICE VISIT (OUTPATIENT)
Dept: INTERNAL MEDICINE CLINIC | Age: 72
End: 2022-03-08
Payer: MEDICARE

## 2022-03-08 VITALS
DIASTOLIC BLOOD PRESSURE: 80 MMHG | SYSTOLIC BLOOD PRESSURE: 118 MMHG | OXYGEN SATURATION: 98 % | RESPIRATION RATE: 18 BRPM | WEIGHT: 195.2 LBS | HEIGHT: 67 IN | BODY MASS INDEX: 30.64 KG/M2 | HEART RATE: 71 BPM

## 2022-03-08 DIAGNOSIS — I70.0 ATHEROSCLEROSIS OF ABDOMINAL AORTA (HCC): ICD-10-CM

## 2022-03-08 DIAGNOSIS — I10 ESSENTIAL HYPERTENSION: Primary | ICD-10-CM

## 2022-03-08 DIAGNOSIS — M48.062 SPINAL STENOSIS OF LUMBAR REGION WITH NEUROGENIC CLAUDICATION: ICD-10-CM

## 2022-03-08 DIAGNOSIS — E78.2 MIXED HYPERLIPIDEMIA: ICD-10-CM

## 2022-03-08 DIAGNOSIS — R73.03 PREDIABETES: ICD-10-CM

## 2022-03-08 PROCEDURE — 1036F TOBACCO NON-USER: CPT | Performed by: INTERNAL MEDICINE

## 2022-03-08 PROCEDURE — G8427 DOCREV CUR MEDS BY ELIG CLIN: HCPCS | Performed by: INTERNAL MEDICINE

## 2022-03-08 PROCEDURE — 1123F ACP DISCUSS/DSCN MKR DOCD: CPT | Performed by: INTERNAL MEDICINE

## 2022-03-08 PROCEDURE — G8399 PT W/DXA RESULTS DOCUMENT: HCPCS | Performed by: INTERNAL MEDICINE

## 2022-03-08 PROCEDURE — G8417 CALC BMI ABV UP PARAM F/U: HCPCS | Performed by: INTERNAL MEDICINE

## 2022-03-08 PROCEDURE — 99214 OFFICE O/P EST MOD 30 MIN: CPT | Performed by: INTERNAL MEDICINE

## 2022-03-08 PROCEDURE — 1090F PRES/ABSN URINE INCON ASSESS: CPT | Performed by: INTERNAL MEDICINE

## 2022-03-08 PROCEDURE — 4040F PNEUMOC VAC/ADMIN/RCVD: CPT | Performed by: INTERNAL MEDICINE

## 2022-03-08 PROCEDURE — 3017F COLORECTAL CA SCREEN DOC REV: CPT | Performed by: INTERNAL MEDICINE

## 2022-03-08 PROCEDURE — G8484 FLU IMMUNIZE NO ADMIN: HCPCS | Performed by: INTERNAL MEDICINE

## 2022-03-08 RX ORDER — PRAVASTATIN SODIUM 40 MG
TABLET ORAL
Qty: 90 TABLET | Refills: 1 | Status: SHIPPED | OUTPATIENT
Start: 2022-03-08 | End: 2022-06-14 | Stop reason: ALTCHOICE

## 2022-03-08 RX ORDER — LISINOPRIL AND HYDROCHLOROTHIAZIDE 20; 12.5 MG/1; MG/1
TABLET ORAL
Qty: 90 TABLET | Refills: 1 | Status: SHIPPED | OUTPATIENT
Start: 2022-03-08

## 2022-03-08 RX ORDER — ROSUVASTATIN CALCIUM 20 MG/1
20 TABLET, COATED ORAL DAILY
Qty: 90 TABLET | Refills: 1 | Status: ON HOLD | OUTPATIENT
Start: 2022-03-08 | End: 2022-08-17 | Stop reason: SDUPTHER

## 2022-03-08 RX ORDER — VALACYCLOVIR HYDROCHLORIDE 1 G/1
1 TABLET, FILM COATED ORAL 2 TIMES DAILY
Qty: 42 TABLET | Refills: 1 | Status: SHIPPED | OUTPATIENT
Start: 2022-03-08

## 2022-03-08 ASSESSMENT — PATIENT HEALTH QUESTIONNAIRE - PHQ9
SUM OF ALL RESPONSES TO PHQ QUESTIONS 1-9: 0
SUM OF ALL RESPONSES TO PHQ QUESTIONS 1-9: 0
1. LITTLE INTEREST OR PLEASURE IN DOING THINGS: 0
SUM OF ALL RESPONSES TO PHQ QUESTIONS 1-9: 0
SUM OF ALL RESPONSES TO PHQ QUESTIONS 1-9: 0
SUM OF ALL RESPONSES TO PHQ9 QUESTIONS 1 & 2: 0
2. FEELING DOWN, DEPRESSED OR HOPELESS: 0

## 2022-03-08 ASSESSMENT — ENCOUNTER SYMPTOMS
BACK PAIN: 1
RESPIRATORY NEGATIVE: 1
EYES NEGATIVE: 1
ALLERGIC/IMMUNOLOGIC NEGATIVE: 1
ABDOMINAL PAIN: 0
BLURRED VISION: 0
SHORTNESS OF BREATH: 0
ORTHOPNEA: 0

## 2022-03-17 PROBLEM — Z11.59 NEED FOR HEPATITIS C SCREENING TEST: Status: RESOLVED | Noted: 2022-02-15 | Resolved: 2022-03-17

## 2022-03-17 PROBLEM — Z00.00 ENCOUNTER FOR PREVENTATIVE ADULT HEALTH CARE EXAMINATION: Status: RESOLVED | Noted: 2022-02-15 | Resolved: 2022-03-17

## 2022-03-17 PROBLEM — Z23 NEED FOR INFLUENZA VACCINATION: Status: RESOLVED | Noted: 2022-02-15 | Resolved: 2022-03-17

## 2022-06-14 ENCOUNTER — OFFICE VISIT (OUTPATIENT)
Dept: INTERNAL MEDICINE CLINIC | Age: 72
End: 2022-06-14
Payer: MEDICARE

## 2022-06-14 VITALS
HEART RATE: 77 BPM | WEIGHT: 201.25 LBS | OXYGEN SATURATION: 97 % | SYSTOLIC BLOOD PRESSURE: 110 MMHG | BODY MASS INDEX: 32 KG/M2 | DIASTOLIC BLOOD PRESSURE: 72 MMHG

## 2022-06-14 DIAGNOSIS — E78.2 MIXED HYPERLIPIDEMIA: ICD-10-CM

## 2022-06-14 DIAGNOSIS — R73.03 PREDIABETES: ICD-10-CM

## 2022-06-14 DIAGNOSIS — I70.0 ATHEROSCLEROSIS OF ABDOMINAL AORTA (HCC): ICD-10-CM

## 2022-06-14 DIAGNOSIS — Z00.00 INITIAL MEDICARE ANNUAL WELLNESS VISIT: Primary | ICD-10-CM

## 2022-06-14 DIAGNOSIS — R10.11 RUQ PAIN: ICD-10-CM

## 2022-06-14 DIAGNOSIS — E55.9 VITAMIN D DEFICIENCY: ICD-10-CM

## 2022-06-14 LAB
ALBUMIN SERPL-MCNC: 4.2 G/DL (ref 3.4–5)
ANION GAP SERPL CALCULATED.3IONS-SCNC: 14 MMOL/L (ref 3–16)
BASOPHILS ABSOLUTE: 0 K/UL (ref 0–0.2)
BASOPHILS RELATIVE PERCENT: 0.9 %
BUN BLDV-MCNC: 13 MG/DL (ref 7–20)
CALCIUM SERPL-MCNC: 9.4 MG/DL (ref 8.3–10.6)
CHLORIDE BLD-SCNC: 103 MMOL/L (ref 99–110)
CO2: 23 MMOL/L (ref 21–32)
CREAT SERPL-MCNC: 0.9 MG/DL (ref 0.6–1.2)
EOSINOPHILS ABSOLUTE: 0.1 K/UL (ref 0–0.6)
EOSINOPHILS RELATIVE PERCENT: 2.3 %
GFR AFRICAN AMERICAN: >60
GFR NON-AFRICAN AMERICAN: >60
GLUCOSE BLD-MCNC: 95 MG/DL (ref 70–99)
HCT VFR BLD CALC: 35.8 % (ref 36–48)
HEMOGLOBIN: 12.5 G/DL (ref 12–16)
LYMPHOCYTES ABSOLUTE: 1.2 K/UL (ref 1–5.1)
LYMPHOCYTES RELATIVE PERCENT: 25.2 %
MCH RBC QN AUTO: 33.5 PG (ref 26–34)
MCHC RBC AUTO-ENTMCNC: 34.9 G/DL (ref 31–36)
MCV RBC AUTO: 95.9 FL (ref 80–100)
MONOCYTES ABSOLUTE: 0.3 K/UL (ref 0–1.3)
MONOCYTES RELATIVE PERCENT: 5.4 %
NEUTROPHILS ABSOLUTE: 3.1 K/UL (ref 1.7–7.7)
NEUTROPHILS RELATIVE PERCENT: 66.2 %
PDW BLD-RTO: 12.8 % (ref 12.4–15.4)
PHOSPHORUS: 3.8 MG/DL (ref 2.5–4.9)
PLATELET # BLD: 261 K/UL (ref 135–450)
PMV BLD AUTO: 8.3 FL (ref 5–10.5)
POTASSIUM SERPL-SCNC: 4.1 MMOL/L (ref 3.5–5.1)
RBC # BLD: 3.74 M/UL (ref 4–5.2)
SODIUM BLD-SCNC: 140 MMOL/L (ref 136–145)
WBC # BLD: 4.6 K/UL (ref 4–11)

## 2022-06-14 PROCEDURE — 1123F ACP DISCUSS/DSCN MKR DOCD: CPT | Performed by: INTERNAL MEDICINE

## 2022-06-14 PROCEDURE — 36415 COLL VENOUS BLD VENIPUNCTURE: CPT | Performed by: INTERNAL MEDICINE

## 2022-06-14 PROCEDURE — 3017F COLORECTAL CA SCREEN DOC REV: CPT | Performed by: INTERNAL MEDICINE

## 2022-06-14 PROCEDURE — G0438 PPPS, INITIAL VISIT: HCPCS | Performed by: INTERNAL MEDICINE

## 2022-06-14 ASSESSMENT — PATIENT HEALTH QUESTIONNAIRE - PHQ9
1. LITTLE INTEREST OR PLEASURE IN DOING THINGS: 0
SUM OF ALL RESPONSES TO PHQ QUESTIONS 1-9: 0
SUM OF ALL RESPONSES TO PHQ QUESTIONS 1-9: 0
2. FEELING DOWN, DEPRESSED OR HOPELESS: 0
SUM OF ALL RESPONSES TO PHQ9 QUESTIONS 1 & 2: 0
SUM OF ALL RESPONSES TO PHQ QUESTIONS 1-9: 0
SUM OF ALL RESPONSES TO PHQ QUESTIONS 1-9: 0

## 2022-06-14 ASSESSMENT — LIFESTYLE VARIABLES: HOW OFTEN DO YOU HAVE A DRINK CONTAINING ALCOHOL: NEVER

## 2022-06-14 NOTE — PATIENT INSTRUCTIONS
Personalized Preventive Plan for Lana Maria - 6/14/2022  Medicare offers a range of preventive health benefits. Some of the tests and screenings are paid in full while other may be subject to a deductible, co-insurance, and/or copay. Some of these benefits include a comprehensive review of your medical history including lifestyle, illnesses that may run in your family, and various assessments and screenings as appropriate. After reviewing your medical record and screening and assessments performed today your provider may have ordered immunizations, labs, imaging, and/or referrals for you. A list of these orders (if applicable) as well as your Preventive Care list are included within your After Visit Summary for your review. Other Preventive Recommendations:    · A preventive eye exam performed by an eye specialist is recommended every 1-2 years to screen for glaucoma; cataracts, macular degeneration, and other eye disorders. · A preventive dental visit is recommended every 6 months. · Try to get at least 150 minutes of exercise per week or 10,000 steps per day on a pedometer . · Order or download the FREE \"Exercise & Physical Activity: Your Everyday Guide\" from [employer]. Call 8-128.655.3036 or search [employer] online. · You need 6793-3579 mg of calcium and 9662-1785 IU of vitamin D per day. It is possible to meet your calcium requirement with diet alone, but a vitamin D supplement is usually necessary to meet this goal.  · When exposed to the sun, use a sunscreen that protects against both UVA and UVB radiation with an SPF of 30 or greater. Reapply every 2 to 3 hours or after sweating, drying off with a towel, or swimming. · Always wear a seat belt when traveling in a car. Always wear a helmet when riding a bicycle or motorcycle.         Super Juice from [employer]

## 2022-06-14 NOTE — PROGRESS NOTES
Medicare Annual Wellness Visit    Nicki Holm is here for Medicare AWV    Assessment & Plan   Initial Medicare annual wellness visit      Recommendations for Preventive Services Due: see orders and patient instructions/AVS.  Recommended screening schedule for the next 5-10 years is provided to the patient in written form: see Patient Instructions/AVS.     Return for Medicare Annual Wellness Visit in 1 year. Subjective   The following acute and/or chronic problems were also addressed today:     Diagnosis Orders   1. Initial Medicare annual wellness visit  Ambulatory Referral to [facility] Clinical Specialist   2. Mixed hyperlipidemia : tolerating Crestor 20 mg qd and controlled without myalgias or weakness. Check b 12 level and continue. Lab Results   Component Value Date    CHOL 180 02/15/2022    CHOL 187 03/26/2019    CHOL 192 03/14/2017     Lab Results   Component Value Date    TRIG 97 02/15/2022    TRIG 125 03/26/2019    TRIG 169 (H) 03/14/2017     Lab Results   Component Value Date    HDL 71 (H) 02/15/2022    HDL 61 (H) 09/22/2020    HDL 67 (H) 03/26/2019     Lab Results   Component Value Date    LDLCALC 90 02/15/2022    LDLCALC 89 09/22/2020    LDLCALC 95 03/26/2019     Lab Results   Component Value Date    LABVLDL 19 02/15/2022    LABVLDL 37 09/22/2020    LABVLDL 25 03/26/2019     No results found for: CHOLHDLRATIO   Vitamin B12    Vitamin B12   3. Prediabetes controlled and in the non diabetic range on diet. Continue. Lab Results   Component Value Date    LABA1C 5.6 02/15/2022    LABA1C 5.9 09/22/2020     Lab Results   Component Value Date    LABMICR Not Indicated 02/15/2022    LDLCALC 90 02/15/2022    CREATININE 0.9 06/14/2022         4. Vitamin D deficiency : high risk for deficiency living in the [facility] and need to keep level above 30 in the Covid 19 pandemic. Will monitor. Vitamin D 25 Hydroxy    Vitamin D 25 Hydroxy   5.  Atherosclerosis of abdominal aorta (HCC) normal blood pressure and on statin for cholesterol and a1c in non diabetic range. Continue current regimen. 6. RUQ pain , will further evaluate. No nausea or vomiting but tender on exam.  Renal Function Panel    CBC with Auto Differential    US GALLBLADDER RUQ    CBC with Auto Differential    Renal Function Panel         Patient's complete Health Risk Assessment and screening values have been reviewed and are found in Flowsheets. The following problems were reviewed today and where indicated follow up appointments were made and/or referrals ordered. Positive Risk Factor Screenings with Interventions:             General Health and ACP:  General  In general, how would you say your health is?: Very Good  In the past 7 days, have you experienced any of the following: New or Increased Pain, New or Increased Fatigue, Loneliness, Social Isolation, Stress or Anger?: No  Do you get the social and emotional support that you need?: Yes  Do you have a Living Will?: (!) No    Advance Directives     Power of  Living Will ACP-Advance Directive ACP-Power of     Not on File Not on File Not on File Not on File      General Health Risk Interventions:  · Poor self-assessment of health status: patient advised to follow-up in this office for further evaluation and treatment of ruq pain after tests. within after test or sooner if symptoms increase. sooner if symptoms increase. · Pain issues: knee pain  given refer for ortho  · Fatigue: will do chair exericses so as not to exacerbate knee pain. · Loneliness: patient's comments regarding inadequate social support: no loneliness issues at this time. · Social isolation: patient's comments regarding inadequate social support: no isolation at this time. · Stress: patient's comments regarding reasons for stress and/or anger: no stress issues at this time. · Anger: patient's comments regarding reasons for stress and/or anger: no stress or anger issues at this time.   · Inadequate social/emotional support: patient's comments regarding inadequate social support: adequate social support. · No Living Will: Patient referred to [place] Habits/Nutrition:     Physical Activity: Inactive    Days of Exercise per Week: 0 days    Minutes of Exercise per Session: 0 min     Have you lost any weight without trying in the past 3 months?: No     Have you seen the dentist within the past year?: Yes    Health Habits/Nutrition Interventions:  · Inadequate physical activity:  chair exercises given. Safety:  Do you have working smoke detectors?: Yes  Do you have any tripping hazards - loose or unsecured carpets or rugs?: (!) Yes  Do you have any tripping hazards - clutter in doorways, halls, or stairs?: No  Do you have either shower bars, grab bars, non-slip mats or non-slip surfaces in your shower or bathtub?: (!) No  Do all of your stairways have a railing or banister?: Yes  Do you always fasten your seatbelt when you are in a car?: Yes    Safety Interventions:  · Patient declines any further evaluation/treatment for this issue           Objective   Vitals:    06/14/22 1543   BP: 110/72   Site: Left Upper Arm   Position: Sitting   Cuff Size: Large Adult   Pulse: 77   SpO2: 97%   Weight: 201 lb 4 oz (91.3 kg)      Body mass index is 32 kg/m². Allergies   Allergen Reactions    Sulfa Antibiotics      Prior to Visit Medications    Medication Sig Taking? Authorizing Provider   valACYclovir (VALTREX) 1 g tablet Take 1 tablet by mouth 2 times daily For one week as needed. Yes Leeanne Ruiz MD   lisinopril-hydroCHLOROthiazide (PRINZIDE;ZESTORETIC) 20-12.5 MG per tablet Take 1 tablet daily by mouth, okay to separate , if do not have in stock. Yes Leeanne Ruiz MD   rosuvastatin (CRESTOR) 20 MG tablet Take 1 tablet by mouth daily Cancel pravastatin script.  Yes Leeanne Ruiz MD   ibuprofen (ADVIL;MOTRIN) 600 MG tablet Take 600 mg by mouth every 6 hours as needed Yes Historical Provider, MD     Physical Exam  Constitutional:       General: She is not in acute distress. Appearance: Normal appearance. She is not ill-appearing, toxic-appearing or diaphoretic. HENT:      Head: Normocephalic and atraumatic. Eyes:      Extraocular Movements: Extraocular movements intact. Conjunctiva/sclera: Conjunctivae normal.      Pupils: Pupils are equal, round, and reactive to light. Neck:      Vascular: No carotid bruit. Cardiovascular:      Rate and Rhythm: Normal rate and regular rhythm. Pulses: Normal pulses. Heart sounds: Normal heart sounds. Pulmonary:      Effort: Pulmonary effort is normal.      Breath sounds: Normal breath sounds. Abdominal:      General: Abdomen is flat. Bowel sounds are normal. There is no distension. Palpations: Abdomen is soft. There is no mass. Tenderness: There is no abdominal tenderness. There is no right CVA tenderness, left CVA tenderness, guarding or rebound. Hernia: No hernia is present. Musculoskeletal:         General: Swelling, tenderness and deformity present. Cervical back: Normal range of motion and neck supple. No rigidity or tenderness. Comments: Swelling and deformity of the right knee. Lymphadenopathy:      Cervical: No cervical adenopathy. Skin:     General: Skin is warm and dry. Neurological:      General: No focal deficit present. Mental Status: She is alert and oriented to person, place, and time. Psychiatric:         Mood and Affect: Mood normal.         Behavior: Behavior normal.         Thought Content:  Thought content normal.         Judgment: Judgment normal.         CareTeam (Including outside providers/suppliers regularly involved in providing care):   Patient Care Team:  Alicia Del Rio MD as PCP - General (Internal Medicine)  Alicia Del Rio MD as PCP - [facility]     Reviewed and updated this visit:  Allergies  Meds

## 2022-06-15 ENCOUNTER — CLINICAL DOCUMENTATION (OUTPATIENT)
Dept: SPIRITUAL SERVICES | Age: 72
End: 2022-06-15

## 2022-06-15 LAB
VITAMIN B-12: 567 PG/ML (ref 211–911)
VITAMIN D 25-HYDROXY: 40.6 NG/ML

## 2022-06-15 NOTE — ACP (ADVANCE CARE PLANNING)
Advance Care Planning   Ambulatory ACP Specialist Patient Outreach    Date:  6/15/2022  ACP Specialist:  SERA Argueta    Outreach call to patient in follow-up to ACP Specialist referral from: Jonathan Barnes MD    [x] PCP  [x] Provider   [] Ambulatory Care Management [] Other for Reason:    [x] Advance Directive Assistance  [] Code Status Discussion  [] Complete Portable DNR Order  [] Discuss Goals of Care  [] Complete POST/MOST  [] Early ACP Decision-Making  [] Other    Date Referral Received:6/14/2022    Today's Outreach:  [x] First   [] Second  [] Third                               Third outreach made by []  phone  [] email []   [employer]     Intervention:  [] Spoke with Patient  [x] Left VM requesting return call       Outcome: A voice message was left offering an ACP conversation. An e-mail was sent offer an ACP conversation and included the ACP packet. Next Step:   [] ACP scheduled conversation  [x] Outreach again in one week               [x] Email / Mail ACP Info Sheets  [x] Email / Mail Advance Directive            [] Close Referral. Routing closure to referring provider/staff     and to ACP Specialist .      Thank you for this referral.

## 2022-06-16 ENCOUNTER — TELEPHONE (OUTPATIENT)
Dept: INTERNAL MEDICINE CLINIC | Age: 72
End: 2022-06-16

## 2022-06-16 DIAGNOSIS — M25.561 PAIN IN BOTH KNEES, UNSPECIFIED CHRONICITY: Primary | ICD-10-CM

## 2022-06-16 DIAGNOSIS — D64.9 NORMOCHROMIC NORMOCYTIC ANEMIA: Primary | ICD-10-CM

## 2022-06-16 DIAGNOSIS — M25.562 PAIN IN BOTH KNEES, UNSPECIFIED CHRONICITY: Primary | ICD-10-CM

## 2022-06-16 PROBLEM — R10.11 RUQ PAIN: Status: ACTIVE | Noted: 2022-06-16

## 2022-06-16 NOTE — TELEPHONE ENCOUNTER
----- Message from Ashely Brian sent at 6/16/2022  2:32 PM EDT -----  Subject: Referral Request    QUESTIONS   Reason for referral request? orthopedic   Has the physician seen you for this condition before? Yes  Select a date? 2022-06-14  Select the Provider the patient wants to be referred to, if known (PCP or   Specialist)? Lizy Andino   Preferred Specialist (if applicable)? Lizy Andino  Do you already have an appointment scheduled? No  Additional Information for Provider? Patient would like a second opinion   orthopedic near the office or inside. Patient would like a call back with   names she can see.  ---------------------------------------------------------------------------  --------------  [address]  What is the best way for the office to contact you? OK to leave message on   voicemail  Preferred Call Back Phone Number? [phone]  ---------------------------------------------------------------------------  --------------  SCRIPT ANSWERS  Relationship to Patient?  Self

## 2022-06-20 SDOH — HEALTH STABILITY: PHYSICAL HEALTH: ON AVERAGE, HOW MANY DAYS PER WEEK DO YOU ENGAGE IN MODERATE TO STRENUOUS EXERCISE (LIKE A BRISK WALK)?: 0 DAYS

## 2022-06-20 SDOH — HEALTH STABILITY: PHYSICAL HEALTH: ON AVERAGE, HOW MANY MINUTES DO YOU ENGAGE IN EXERCISE AT THIS LEVEL?: 10 MIN

## 2022-06-21 ENCOUNTER — HOSPITAL ENCOUNTER (OUTPATIENT)
Dept: ULTRASOUND IMAGING | Age: 72
Discharge: HOME | End: 2022-06-21
Payer: MEDICARE

## 2022-06-21 DIAGNOSIS — R10.11 RUQ PAIN: ICD-10-CM

## 2022-06-21 PROCEDURE — 76705 ECHO EXAM OF ABDOMEN: CPT

## 2022-06-22 ENCOUNTER — CLINICAL DOCUMENTATION (OUTPATIENT)
Dept: SPIRITUAL SERVICES | Age: 72
End: 2022-06-22

## 2022-06-22 NOTE — ACP (ADVANCE CARE PLANNING)
Advance Care Planning   Ambulatory ACP Specialist Patient Outreach    Date:  6/22/2022  ACP Specialist:  Maryjo Holstein, LISW    Outreach call to patient in follow-up to ACP Specialist referral from: Eligio Colón MD    [x] PCP  [x] Provider   [] Ambulatory Care Management [] Other for Reason:    [x] Advance Directive Assistance  [] Code Status Discussion  [] Complete Portable DNR Order  [] Discuss Goals of Care  [] Complete POST/MOST  [] Early ACP Decision-Making  [] Other    Date Referral Received: 6/14/2022    Today's Outreach:  [] First   [x] Second  [] Third                               Third outreach made by []  phone  [] email []   [employer]     Intervention:  [] Spoke with Patient  [x] Left VM requesting return call      Outcome:  A voice message was left offering an ACP conversation. Next Step:   [] ACP scheduled conversation  [x] Outreach again in one week               [] Email / Mail ACP Info Sheets  [] Email / Mail Advance Directive            [] Close Referral. Routing closure to referring provider/staff     and to ACP Specialist .      Thank you for this referral.

## 2022-06-23 ENCOUNTER — OFFICE VISIT (OUTPATIENT)
Dept: ORTHOPEDIC SURGERY | Age: 72
End: 2022-06-23

## 2022-06-23 VITALS — BODY MASS INDEX: 30.61 KG/M2 | HEIGHT: 67 IN | WEIGHT: 195 LBS

## 2022-06-23 DIAGNOSIS — M17.12 PRIMARY OSTEOARTHRITIS OF LEFT KNEE: ICD-10-CM

## 2022-06-23 DIAGNOSIS — M17.11 PRIMARY OSTEOARTHRITIS OF RIGHT KNEE: Primary | ICD-10-CM

## 2022-06-23 PROCEDURE — 3017F COLORECTAL CA SCREEN DOC REV: CPT | Performed by: ORTHOPAEDIC SURGERY

## 2022-06-23 PROCEDURE — G8399 PT W/DXA RESULTS DOCUMENT: HCPCS | Performed by: ORTHOPAEDIC SURGERY

## 2022-06-23 PROCEDURE — G8417 CALC BMI ABV UP PARAM F/U: HCPCS | Performed by: ORTHOPAEDIC SURGERY

## 2022-06-23 PROCEDURE — 99203 OFFICE O/P NEW LOW 30 MIN: CPT | Performed by: ORTHOPAEDIC SURGERY

## 2022-06-23 PROCEDURE — 1090F PRES/ABSN URINE INCON ASSESS: CPT | Performed by: ORTHOPAEDIC SURGERY

## 2022-06-23 PROCEDURE — 1123F ACP DISCUSS/DSCN MKR DOCD: CPT | Performed by: ORTHOPAEDIC SURGERY

## 2022-06-23 PROCEDURE — G8427 DOCREV CUR MEDS BY ELIG CLIN: HCPCS | Performed by: ORTHOPAEDIC SURGERY

## 2022-06-23 PROCEDURE — 1036F TOBACCO NON-USER: CPT | Performed by: ORTHOPAEDIC SURGERY

## 2022-06-24 NOTE — PROGRESS NOTES
[facility]  Office Visit    Chief Complaint: Bilateral knee pain    HPI:  Roxanne Rothman is a 66 y.o. who is here for initial assessment of bilateral knee pain. Pain is worse in the right. There is no history of injury. She does have a recent history of right knee arthroscopy performed in September at [facility] by Dr. Rubi Escobar. She has been diagnosed with osteoarthritis. She continued to have right knee pain following her surgery so she was treated with viscosupplementation injection in November 2021. This mildly helped with symptoms. In the interim, she reports she was worked up and treated for left hip bursitis as well as lumbar spine stenosis. She was treated at [address] [place] [address] with an epidural steroid injection and physical therapy. Her right knee has been painful at all time and causes difficulty in walking. She reports that it feels tight in her right knee. She began having left knee pain just yesterday. There is no injury or surgery history for the left knee. Pain is mostly anterior and medial.  She reports that both legs feel weak and she has trouble walking or standing long periods of time due to the weak feeling. She has been taking ibuprofen and gabapentin to help with pain. She denies diabetes, history of blood clots, blood thinners, tobacco use, narcotic pain medications and sleep apnea. She does have help at home. She works full-time. She walks without assistive device.       Patient Active Problem List   Diagnosis    Mixed hyperlipidemia    Colon polyp    Essential hypertension    Class 1 obesity due to excess calories without serious comorbidity with body mass index (BMI) of 33.0 to 33.9 in adult    History of colon cancer, stage I    Benign neoplasm of skin of face    Ganglion    Hypertrophic and atrophic condition of skin    Lumbar stenosis    Initial Medicare annual wellness visit    Vitamin D deficiency    Family history of type 2 diabetes mellitus    Carotid artery calcification, bilateral    Prediabetes    Atherosclerosis of abdominal aorta (HCC)    RUQ pain       ROS:  Constitutional: denies fever, chills, weight loss  MSK: denies pain in other joints, muscle aches  Neurological: denies numbness, tingling, weakness    Exam:  Height 5' 6.5\" (1.689 m), weight 195 lb (88.5 kg)    Appearance: sitting in exam room chair, appears to be in no acute distress, awake and alert  Resp: unlabored breathing on room air  Skin: warm, dry and intact with out erythema or significant increased temperature  Neuro: grossly intact both lower extremities. Intact sensation to light touch. Motor exam 4+ to 5/5 in all major motor groups. Bilateral knees: Negative Stinchfield at the hip. Examination reveals that knee range of motion is 0 to 130 degrees. There is varus deformity, positive crepitus, positive joint line tenderness, positive antalgic gait. Neurologically, plantar flexion and dorsiflexion is intact. 5/5 strength. Imaging:  3 views of bilateral knees were performed and interpreted today. She has tricompartmental degenerative changes bilaterally with near bone-on-bone osteoarthritis of medial compartment of the right knee. There are periarticular osteophytes. MRI of the lumbar spine report was reviewed. Images were not available for review. The report notes that there is severe lumbar spine central canal stenosis. Assessment:  Bilateral knee osteoarthritis, more symptomatic on the right    Plan:  Discussed the diagnosis and treatment options. Clearly she does have osteoarthritis in the right knee that is affecting her gait. However, she also has severe lumbar spine stenosis is likely causing her symptoms of weakness in both legs having trouble standing and walking long periods of time. We discussed treatment options for the right knee to include NSAIDs, repeat steroid and viscosupplementation injections, total knee arthroplasty.   We further discussed right total knee arthroplasty. The operative procedure, alternatives, and risks were discussed in detail with the patient. The risks include but are not limited to: Infection, vessel injury, nerve injury, DVT, pulmonary embolism, implant loosening, need for revision surgery, loss of motion, continued pain. Despite these risks the patient would like to proceed. All questions have been answered and no guarantees have been made. The patient is unable to do further physical therapy due to disabling pain. I discussed with the patient the diagnosis in detail and answered all the questions. The patient verbalized understanding of the plan as it has been described above and is in agreement. We will tentatively plan for right total knee arthroplasty. We also discussed her symptoms of leg weakness and her lumbar spine canal stenosis. I recommended she return to [facility] for further treatment as knee replacement will likely not help her leg weakness. I recommended doing this before any knee replacement surgery takes place. Total time spent on today's encounter was at least 31 minutes. This time included reviewing prior notes, radiographs, and lab results when available, reviewing history obtained by medical assistant, performing history and physical exam, reviewing tests/radiographs with the patient, counseling the patient, ordering medications or tests, documentation in the electronic health record, and coordination of care. This dictation was done with [employer] dictation and may contain mechanical errors related to translation.

## 2022-06-29 ENCOUNTER — CLINICAL DOCUMENTATION (OUTPATIENT)
Dept: SPIRITUAL SERVICES | Age: 72
End: 2022-06-29

## 2022-06-29 NOTE — ACP (ADVANCE CARE PLANNING)
Advance Care Planning   Ambulatory ACP Specialist Patient Outreach    Date:  6/29/2022  ACP Specialist:  SERA Marroquin    Outreach call to patient in follow-up to ACP Specialist referral from: Jaye Sanchez MD    [x] PCP  [x] Provider   [] Ambulatory Care Management [] Other for Reason:    [x] Advance Directive Assistance  [] Code Status Discussion  [] Complete Portable DNR Order  [] Discuss Goals of Care  [] Complete POST/MOST  [] Early ACP Decision-Making  [] Other    Date Referral Received: 6/14/2022    Today's Outreach:  [] First   [] Second  [x] Third                               Third outreach made by []  phone  [] email [x]   [email or web address]     Intervention:  [] Spoke with Patient  [x] Left VM requesting return call      Outcome: A message was sent to the patients [employer] offering an ACP conversation. Next Step:   [] ACP scheduled conversation  [] Outreach again in one week               [] Email / Mail ACP Info Sheets  [] Email / Mail Advance Directive            [x] Close Referral. Routing closure to referring provider/staff     and to ACP Specialist .      Thank you for this referral.

## 2022-07-05 ENCOUNTER — HOSPITAL ENCOUNTER (OUTPATIENT)
Dept: CT IMAGING | Age: 72
Discharge: HOME | End: 2022-07-05
Payer: MEDICARE

## 2022-07-05 DIAGNOSIS — M17.11 PRIMARY OSTEOARTHRITIS OF RIGHT KNEE: ICD-10-CM

## 2022-07-05 PROCEDURE — 73700 CT LOWER EXTREMITY W/O DYE: CPT

## 2022-07-16 PROBLEM — Z00.00 INITIAL MEDICARE ANNUAL WELLNESS VISIT: Status: RESOLVED | Noted: 2022-02-15 | Resolved: 2022-07-16

## 2022-07-19 ENCOUNTER — APPOINTMENT (OUTPATIENT)
Dept: PHYSICAL THERAPY | Age: 72
End: 2022-07-19
Payer: MEDICARE

## 2022-07-19 NOTE — PLAN OF CARE
[address] [facility] [place]. [place] Ortiz [address]  Phone: [phone]   Fax:     [phone]          Physical Therapy Certification    Dear Referring Provider (secondary): Dr. German Oquendo,    We had the pleasure of evaluating the following patient for physical therapy services at [facility]. A summary of our findings can be found in the initial assessment below. This includes our plan of care. If you have any questions or concerns regarding these findings, please do not hesitate to contact me at the office phone number checked above. Thank you for the referral.       Physician Signature:_______________________________Date:__________________  By signing above (or electronic signature), therapists plan is approved by physician        Patient: Kathleen Brown   : 1950   MRN: [id number]  Referring Physician: Referring Provider (secondary): Dr. German Oquendo      Evaluation Date: 2022      Medical Diagnosis Information:  Diagnosis: M17.11 (ICD-10-CM) - Primary osteoarthritis of right knee   Treatment Diagnosis: decreased abilty to ambualte and function                                         Insurance information: PT Insurance Information: [employer]     Precautions/ Contra-indications:   Latex Allergy:  [x]NO      []YES  Preferred Language for Healthcare:   [x]English       []other:    C-SSRS Triggered by Intake questionnaire (Past 2 wk assessment ):   [x] No, Questionnaire did not trigger screening.   [] Yes, Patient intake triggered C-SSRS Screening      [] C-SSRS Screening completed  [] PCP notified via Epic     SUBJECTIVE: Patient is a 65 y/o female with a hx of right knee pain who is scheduled for a tka on 22 with Dr. German Oquendo. She had a menisectomy last year which helped for a little while.   She says her knee doesn't lock or give out but it feels like the bones are rubbing. She works as a  and is on her feet all day. Relevant Medical History:Additional Pertinent Hx: colon ca, htn, hpl,  Functional Outcome Measure: FOTO = 38    Pain Scale: 6/10  Easing factors: rest  Provocative factors: use     Type: [x]Constant   []Intermittent  []Radiating []Localized []other:     Numbness/Tingling: denies    Occupation/School:, on feet all day    Hospital:[facility]    [] Total Hip Replacement [] Right  [] Left  DOS:8/17/22   [] Not yet scheduled  [x] Total Knee Replacement [x] Right  [] Left    [] Prehab Eval  [] Prehab D/C  [] Post - OP Visit             Weeks from sx [] Post-op D/C    DME ASSESSMENT:   Current available equipment:    [] Std. Hermleigh Ra       [] Rolling walker      [] 4 wheeled walker     [] Shashank Jackelyn     [] Straight cane     [] Crutches   [] Reacher            [] Sock Aid              [] Shower chair            [] Leg           [] Long handle shoe horn   [] Other:     Equipment needed at discharge from hospital:   [] Std. Hermleigh Ra       [x] Rolling walker      [] 4 wheeled walker     [] Shashank Jackelyn     [x] Straight cane     [] Crutches   [] Reacher            [] Sock Aid              [x] Shower chair            [] Leg           [] Long handle shoe horn   [] Other:       SOCIAL ASSESSMENT:  1. Will you live with someone who can care for you after surgery? [x] Yes  [] No  2. Where is the bathroom located in your post-surgery place of residence? [] 1st Floor [x] 2nd Floor  3. Where is the bedroom located in your post-surgery place of residence? [] 1st Floor [x] 2nd Floor  4. Do you use community supports (home help, meals-on-wheels, district nurse)? [x] None or 1 per week  [] 2 or more per week  5. How many stairs will you have to climb to get in to your place of residence? [] Less than 5  [x] More than 5  6. Have you had a fall in the past year?    [] Yes  [x] No     Notes:     Available PT Visits:  Per [employer]    BMI:    Height    Weight FUNCTIONAL ASSESSMENT:   1. Do you use ambulatory aids? [x] None [] Single Point Cane  [] Crutches/Walker/Wheelchair  2. How far on average can you walk? [] 2 Blocks or more  [x] 1-2 Blocks  [] House bound most of the time  3. What is your physical activity level? [] Highly Active [x] Active  [] Somewhat active  [] Sedentary     OBJECTIVE:   Palpation: tight and tender in gastroc, hams, quads, decreased patellar mob all directions, does not like having here nee touched, hypersensitive to touch    Quad: 4/5    Functional Mobility/Transfers: ind    Posture: ant tilt , increased lumbar lordosis, genu valgus noted        Gait- ambulates with er of right le, mod limp noted , decreased stance phase    Reflexes/Sensation:    [x]Dermatomes/Myotomes intact    [x]Reflexes equal and normal bilaterally   []Other:     PROM AROM    L R L R   Hip Flexion 110 100 100 100   Knee Flexion 120 120 120 110   Knee Extension 0 -15 0 -20       Strength (0-5) Left Right   Hip Flexion - seated 5 4   Hip Abduction - sidelyling 5 4   Hip Extension 5 4   Quads 27# 17#   Hamstrings 5 4   Ankle DF 5 4   Ankle PF 5 4        Flexibility     Hamstrings (90/90) -10 -15   ITB (Maicol) tight tight   Quads (Ely's) tighit 90   Hip Flexor (Jerry) tight tight        Girth     Mid patella 44 45   Suprapatellar 48 49       Joint mobility: patellofemoral    []Normal    [x]Hypo   []Hyper       Functional testing Prehab        Date    4 week f/u   Date    8 week f/u  Date    D/C  Date    7/20/22      TUG 12      30 second sit to stand 9      6 minute walk              Balance       Narrow HODAN 8      Semi tandem 6      Tandem  5      SLS 5             Knee AROM       Knee Extension MMT R/L L=27  R=17      Hip aBduction MMT R/L L=40  R=30      WOMAC                              [x] Patient history, allergies, meds reviewed. Medical chart reviewed. See intake form.      Review Of Systems (ROS):  [x]Performed Review of systems (Integumentary, CardioPulmonary, Neurological) by intake and observation. Intake form has been scanned into medical record. Patient has been instructed to contact their primary care physician regarding ROS issues if not already being addressed at this time. Co-morbidities/Complexities (which will affect course of rehabilitation):   []None           Arthritic conditions   []Rheumatoid arthritis (M05.9)  []Osteoarthritis (M19.91)   Cardiovascular conditions   [x]Hypertension (I10)  [x]Hyperlipidemia (E78.5)  []Angina pectoris (I20)  []Atherosclerosis (I70)   Musculoskeletal conditions   []Disc pathology   []Congenital spine pathologies   []Prior surgical intervention  []Osteoporosis (M81.8)  []Osteopenia (M85.8)   Endocrine conditions   []Hypothyroid (E03.9)  []Hyperthyroid Gastrointestinal conditions   []Constipation (U51.79)   Metabolic conditions   []Morbid obesity (E66.01)  []Diabetes type 1(E10.65) or 2 (E11.65)   []Neuropathy (G60.9)     Pulmonary conditions   []Asthma (J45)  []Coughing   []COPD (J44.9)   Psychological Disorders  []Anxiety (F41.9)  []Depression (F32.9)   []Other:   [x]Other:   colon cancer       Barriers to/and or personal factors that will affect rehab potential:              []Age  []Sex    []Smoker              []Motivation/Lack of Motivation                        []Co-Morbidities              []Cognitive Function, education/learning barriers              []Environmental, home barriers              []profession/work barriers  []past PT/medical experience  []other:  Justification:     Falls Risk Assessment (30 days):   [x] Falls Risk assessed and no intervention required.   [] Falls Risk assessed and Patient requires intervention due to being higher risk   TUG score (>12s at risk):     [] Falls education provided, including         ASSESSMENT:   Functional Impairments:     []Noted lumbar/proximal hip/LE joint hypomobility   [x]Decreased LE functional ROM   []Decreased core/proximal hip strength and neuromuscular control   [x]Decreased LE functional strength   [x]Reduced balance/proprioceptive control   []other:      Functional Activity Limitations (from functional questionnaire and intake)   []Reduced ability to tolerate prolonged functional positions   []Reduced ability or difficulty with changes of positions or transfers between positions   []Reduced ability to maintain good posture and demonstrate good body mechanics with sitting, bending, and lifting   [x]Reduced ability to sleep   [x] Reduced ability or tolerance with driving and/or computer work   [x]Reduced ability to perform lifting, carrying tasks   [x]Reduced ability to squat   []Reduced ability to forward bend   [x]Reduced ability to ambulate prolonged functional periods/distances/surfaces   [x]Reduced ability to ascend/descend stairs   [x]Reduced ability to run, hop, cut or jump   []other:    Participation Restrictions   [x]Reduced participation in self care activities   [x]Reduced participation in home management activities   [x]Reduced participation in work activities   [x]Reduced participation in social activities. [x]Reduced participation in sport/recreation activities. Classification :    []Signs/symptoms consistent with post-surgical status including decreased ROM, strength and function.    []Signs/symptoms consistent with joint sprain/strain   []Signs/symptoms consistent with patella-femoral syndrome   [x]Signs/symptoms consistent with knee OA/hip OA   []Signs/symptoms consistent with internal derangement of knee/Hip   []Signs/symptoms consistent with functional hip weakness/NMR control      []Signs/symptoms consistent with tendinitis/tendinosis    []signs/symptoms consistent with pathology which may benefit from Dry needling      []other:      Prognosis/Rehab Potential:      []Excellent   [x]Good    []Fair   []Poor    Tolerance of evaluation/treatment:    []Excellent   [x]Good    []Fair   []Poor    Physical Therapy Evaluation Complexity Justification  [x] A history of present problem with:  [x] no personal factors and/or comorbidities that impact the plan of care;  []1-2 personal factors and/or comorbidities that impact the plan of care  []3 personal factors and/or comorbidities that impact the plan of care  [x] An examination of body systems using standardized tests and measures addressing any of the following: body structures and functions (impairments), activity limitations, and/or participation restrictions;:  [] a total of 1-2 or more elements   [x] a total of 3 or more elements   [] a total of 4 or more elements   [x] A clinical presentation with:  [] stable and/or uncomplicated characteristics   [x] evolving clinical presentation with changing characteristics  [] unstable and unpredictable characteristics;   [x] Clinical decision making of [] low, [] moderate, [] high complexity using standardized patient assessment instrument and/or measurable assessment of functional outcome. [x] EVAL (LOW) 93998 (typically 30 minutes face-to-face)  [] EVAL (MOD) 15501 (typically 30 minutes face-to-face)  [] EVAL (HIGH) 15269 (typically 45 minutes face-to-face)  [] RE-EVAL     PLAN:  Frequency/Duration:  1-1 days per week for 1-4 Weeks:  Interventions:  [x]  Therapeutic exercise including: strength training, ROM, for Lower extremity and core   [x]  NMR activation and proprioception for LE, Glutes and Core   [x]  Manual therapy as indicated for LE, Hip and spine to include: Dry Needling/IASTM, STM, PROM, Gr I-IV mobilizations, manipulation. [x] Modalities as needed that may include: thermal agents, E-stim, Biofeedback, US, iontophoresis as indicated  [x] Patient education on joint protection, postural re-education, activity modification, progression of HEP.     HEP instruction: written HEP instructions provided and discussed    Patient education:  Patient was thoroughly educated on this date regarding prehabilitation goals, importance of PT sessions in improving overall ROM, strength and stability prior to surgery, and how prehabilitation will facilitate improved post-operative outcomes. The patient was educated on and instructed in HEP as listed. The patient was given a detailed handout for exercises to initiate in the hospital post-operatively as well as at home. The discharge plan from the hospital was reviewed with the patient; specifically, to reduce length of hospital stay and to minimize time before reinitiating outpatient physical therapy after surgery. Education regarding early mobility post-operatively in the hospital and emphasis on working with both physical therapy and nursing staff to achieve ambulation goal  was provided. The patient was highly encouraged to attend joint class in hospital prior to surgery for further instructions on pre and post-surgical care. Also, education regarding goals and expectations was provided; specifically, knee flexion range of motion greater than or equal to 90 degrees and 0 degrees knee extension to promote improved gait mechanics and ambulation. The patient was encouraged to utilize ice/cold pack after surgery to address pain, minimize swelling as often as possible. It is in my medical opinion that this patient is clear from all physical barriers prior to consideration for surgery, activity modifications prior to and post operatively have been discussed with this patient as well as discharge planning and is cleared for surgery from physical therapy perspective. Went over gait with cane, walker, Steps with cane  Discussed mechanism of injury, anatomy, physiology, biomechanics, sleeping positions,  and strategies to accelerate the healing process. Answered all of patients questions regarding injury. Gave necessary information to get any equipment needed. Access Code: [email or web address]  URL: [email or web address]  Date: 07/20/2022  Prepared by: Carrillo Rush    Exercises  Supine Active Straight Leg Raise - 1 x daily - 7 x

## 2022-07-20 ENCOUNTER — HOSPITAL ENCOUNTER (OUTPATIENT)
Dept: PHYSICAL THERAPY | Age: 72
Setting detail: THERAPIES SERIES
Discharge: HOME | End: 2022-07-20
Payer: MEDICARE

## 2022-07-20 PROCEDURE — 97161 PT EVAL LOW COMPLEX 20 MIN: CPT

## 2022-07-20 PROCEDURE — 97530 THERAPEUTIC ACTIVITIES: CPT

## 2022-07-20 PROCEDURE — 97110 THERAPEUTIC EXERCISES: CPT

## 2022-07-27 ENCOUNTER — TELEPHONE (OUTPATIENT)
Dept: ORTHOPEDIC SURGERY | Age: 72
End: 2022-07-27

## 2022-07-27 NOTE — TELEPHONE ENCOUNTER
William Sandoval [id number]    Date: 08/17/22 thru 11/15/22  Type of SX:  OUTPATIENT/OBSERVATION  Location: W  CPT: 83489   DX Code: M17.11  SX area:  knee  Insurance: [facility]

## 2022-07-28 ENCOUNTER — PREP FOR PROCEDURE (OUTPATIENT)
Dept: ORTHOPEDICS UNIT | Age: 72
End: 2022-07-28

## 2022-08-07 RX ORDER — OXYCODONE HYDROCHLORIDE 10 MG/1
10 TABLET ORAL ONCE
Status: CANCELLED | OUTPATIENT
Start: 2022-08-07 | End: 2022-08-07

## 2022-08-07 RX ORDER — MELOXICAM 7.5 MG/1
7.5 TABLET ORAL ONCE
Status: CANCELLED | OUTPATIENT
Start: 2022-08-07 | End: 2022-08-07

## 2022-08-07 RX ORDER — TRANEXAMIC ACID 650 1/1
1950 TABLET ORAL ONCE
Status: CANCELLED | OUTPATIENT
Start: 2022-08-07 | End: 2022-08-07

## 2022-08-08 ENCOUNTER — OFFICE VISIT (OUTPATIENT)
Dept: INTERNAL MEDICINE CLINIC | Age: 72
End: 2022-08-08
Payer: MEDICARE

## 2022-08-08 ENCOUNTER — TELEPHONE (OUTPATIENT)
Dept: ORTHOPEDIC SURGERY | Age: 72
End: 2022-08-08

## 2022-08-08 ENCOUNTER — HOSPITAL ENCOUNTER (OUTPATIENT)
Dept: PREADMISSION TESTING | Age: 72
Discharge: HOME | End: 2022-08-08
Payer: MEDICARE

## 2022-08-08 VITALS
BODY MASS INDEX: 30.98 KG/M2 | HEART RATE: 85 BPM | WEIGHT: 197.4 LBS | SYSTOLIC BLOOD PRESSURE: 98 MMHG | HEIGHT: 67 IN | TEMPERATURE: 98 F | OXYGEN SATURATION: 97 % | DIASTOLIC BLOOD PRESSURE: 60 MMHG | RESPIRATION RATE: 18 BRPM

## 2022-08-08 DIAGNOSIS — Z01.818 PRE-OP EVALUATION: Primary | ICD-10-CM

## 2022-08-08 DIAGNOSIS — M17.11 PRIMARY OSTEOARTHRITIS OF RIGHT KNEE: Primary | ICD-10-CM

## 2022-08-08 DIAGNOSIS — M17.11 PRIMARY OSTEOARTHRITIS OF RIGHT KNEE: ICD-10-CM

## 2022-08-08 LAB
ABO/RH: NORMAL
ALBUMIN SERPL-MCNC: 4.3 G/DL (ref 3.4–5)
ANION GAP SERPL CALCULATED.3IONS-SCNC: 10 MMOL/L (ref 3–16)
ANTIBODY SCREEN: NORMAL
APTT: 29.4 SEC (ref 23–34.3)
BASOPHILS ABSOLUTE: 0 K/UL (ref 0–0.2)
BASOPHILS RELATIVE PERCENT: 0.7 %
BILIRUBIN URINE: NEGATIVE
BLOOD, URINE: NEGATIVE
BUN BLDV-MCNC: 8 MG/DL (ref 7–20)
CALCIUM SERPL-MCNC: 9.4 MG/DL (ref 8.3–10.6)
CHLORIDE BLD-SCNC: 101 MMOL/L (ref 99–110)
CLARITY: CLEAR
CO2: 28 MMOL/L (ref 21–32)
COLOR: YELLOW
CREAT SERPL-MCNC: 0.7 MG/DL (ref 0.6–1.2)
EOSINOPHILS ABSOLUTE: 0.1 K/UL (ref 0–0.6)
EOSINOPHILS RELATIVE PERCENT: 2.3 %
ESTIMATED AVERAGE GLUCOSE: 116.9 MG/DL
GFR AFRICAN AMERICAN: >60
GFR NON-AFRICAN AMERICAN: >60
GLUCOSE BLD-MCNC: 85 MG/DL (ref 70–99)
GLUCOSE URINE: NEGATIVE MG/DL
HBA1C MFR BLD: 5.7 %
HCT VFR BLD CALC: 39.6 % (ref 36–48)
HEMOGLOBIN: 13.6 G/DL (ref 12–16)
INR BLD: 1.04 (ref 0.87–1.14)
KETONES, URINE: NEGATIVE MG/DL
LEUKOCYTE ESTERASE, URINE: NEGATIVE
LYMPHOCYTES ABSOLUTE: 0.9 K/UL (ref 1–5.1)
LYMPHOCYTES RELATIVE PERCENT: 23.3 %
MCH RBC QN AUTO: 33 PG (ref 26–34)
MCHC RBC AUTO-ENTMCNC: 34.3 G/DL (ref 31–36)
MCV RBC AUTO: 96 FL (ref 80–100)
MICROSCOPIC EXAMINATION: NORMAL
MONOCYTES ABSOLUTE: 0.3 K/UL (ref 0–1.3)
MONOCYTES RELATIVE PERCENT: 6.8 %
NEUTROPHILS ABSOLUTE: 2.7 K/UL (ref 1.7–7.7)
NEUTROPHILS RELATIVE PERCENT: 66.9 %
NITRITE, URINE: NEGATIVE
PDW BLD-RTO: 12.6 % (ref 12.4–15.4)
PH UA: 5.5 (ref 5–8)
PLATELET # BLD: 254 K/UL (ref 135–450)
PMV BLD AUTO: 7.6 FL (ref 5–10.5)
POTASSIUM SERPL-SCNC: 3.6 MMOL/L (ref 3.5–5.1)
PREALBUMIN: 22.9 MG/DL (ref 20–40)
PROTEIN UA: NEGATIVE MG/DL
PROTHROMBIN TIME: 13.5 SEC (ref 11.7–14.5)
RBC # BLD: 4.13 M/UL (ref 4–5.2)
SODIUM BLD-SCNC: 139 MMOL/L (ref 136–145)
SPECIFIC GRAVITY UA: 1.01 (ref 1–1.03)
URINE REFLEX TO CULTURE: NORMAL
URINE TYPE: NORMAL
UROBILINOGEN, URINE: 0.2 E.U./DL
VITAMIN D 25-HYDROXY: 39.4 NG/ML
WBC # BLD: 4.1 K/UL (ref 4–11)

## 2022-08-08 PROCEDURE — G8399 PT W/DXA RESULTS DOCUMENT: HCPCS | Performed by: INTERNAL MEDICINE

## 2022-08-08 PROCEDURE — 81003 URINALYSIS AUTO W/O SCOPE: CPT

## 2022-08-08 PROCEDURE — MISCCOLD COLD THERAPY UNIT AND PAD: Performed by: ORTHOPAEDIC SURGERY

## 2022-08-08 PROCEDURE — G8427 DOCREV CUR MEDS BY ELIG CLIN: HCPCS | Performed by: INTERNAL MEDICINE

## 2022-08-08 PROCEDURE — 80048 BASIC METABOLIC PNL TOTAL CA: CPT

## 2022-08-08 PROCEDURE — 84134 ASSAY OF PREALBUMIN: CPT

## 2022-08-08 PROCEDURE — 82306 VITAMIN D 25 HYDROXY: CPT

## 2022-08-08 PROCEDURE — 82040 ASSAY OF SERUM ALBUMIN: CPT

## 2022-08-08 PROCEDURE — 85610 PROTHROMBIN TIME: CPT

## 2022-08-08 PROCEDURE — 1090F PRES/ABSN URINE INCON ASSESS: CPT | Performed by: INTERNAL MEDICINE

## 2022-08-08 PROCEDURE — 1123F ACP DISCUSS/DSCN MKR DOCD: CPT | Performed by: INTERNAL MEDICINE

## 2022-08-08 PROCEDURE — 83036 HEMOGLOBIN GLYCOSYLATED A1C: CPT

## 2022-08-08 PROCEDURE — 87641 MR-STAPH DNA AMP PROBE: CPT

## 2022-08-08 PROCEDURE — 99214 OFFICE O/P EST MOD 30 MIN: CPT | Performed by: INTERNAL MEDICINE

## 2022-08-08 PROCEDURE — 86900 BLOOD TYPING SEROLOGIC ABO: CPT

## 2022-08-08 PROCEDURE — 86850 RBC ANTIBODY SCREEN: CPT

## 2022-08-08 PROCEDURE — 85025 COMPLETE CBC W/AUTO DIFF WBC: CPT

## 2022-08-08 PROCEDURE — 3017F COLORECTAL CA SCREEN DOC REV: CPT | Performed by: INTERNAL MEDICINE

## 2022-08-08 PROCEDURE — 85730 THROMBOPLASTIN TIME PARTIAL: CPT

## 2022-08-08 PROCEDURE — 86901 BLOOD TYPING SEROLOGIC RH(D): CPT

## 2022-08-08 PROCEDURE — G8417 CALC BMI ABV UP PARAM F/U: HCPCS | Performed by: INTERNAL MEDICINE

## 2022-08-08 PROCEDURE — 1036F TOBACCO NON-USER: CPT | Performed by: INTERNAL MEDICINE

## 2022-08-08 RX ORDER — CHOLECALCIFEROL (VITAMIN D3) 1250 MCG
CAPSULE ORAL
COMMUNITY

## 2022-08-08 RX ORDER — UBIDECARENONE 75 MG
50 CAPSULE ORAL DAILY
COMMUNITY

## 2022-08-08 RX ORDER — MULTIVIT WITH MINERALS/LUTEIN
250 TABLET ORAL DAILY
COMMUNITY

## 2022-08-08 ASSESSMENT — ENCOUNTER SYMPTOMS
BACK PAIN: 1
ABDOMINAL PAIN: 0
ALLERGIC/IMMUNOLOGIC NEGATIVE: 1
SHORTNESS OF BREATH: 0
RESPIRATORY NEGATIVE: 1
EYES NEGATIVE: 1

## 2022-08-08 ASSESSMENT — PATIENT HEALTH QUESTIONNAIRE - PHQ9
SUM OF ALL RESPONSES TO PHQ QUESTIONS 1-9: 0
2. FEELING DOWN, DEPRESSED OR HOPELESS: 0
SUM OF ALL RESPONSES TO PHQ QUESTIONS 1-9: 0
SUM OF ALL RESPONSES TO PHQ QUESTIONS 1-9: 0
SUM OF ALL RESPONSES TO PHQ9 QUESTIONS 1 & 2: 0
SUM OF ALL RESPONSES TO PHQ QUESTIONS 1-9: 0
1. LITTLE INTEREST OR PLEASURE IN DOING THINGS: 0

## 2022-08-08 NOTE — PROGRESS NOTES
2022    Mikaela Ott (:  1950) is a 66 y.o. female, here for a preoperative medicine evaluation for right total knee replacement with DR. Chris Denny at [facility] on 22. Jet class is today with labs . EKG done and unchanged from 2021 with no GUERRA or Chest pain or shortness of breath. Stable for surgery with RSR and no acute changes. Lab Results   Component Value Date    CHOL 180 02/15/2022    CHOL 187 2019    CHOL 192 2017     Lab Results   Component Value Date    TRIG 97 02/15/2022    TRIG 125 2019    TRIG 169 (H) 2017     Lab Results   Component Value Date    HDL 71 (H) 02/15/2022    HDL 61 (H) 2020    HDL 67 (H) 2019     Lab Results   Component Value Date    LDLCALC 90 02/15/2022    [address] 89 2020    [address] 95 2019     Lab Results   Component Value Date    LABVLDL 19 02/15/2022    LABVLDL 37 2020    LABVLDL 25 2019     No results found for: CHOLHDLRATIO      Patient Active Problem List   Diagnosis    Mixed hyperlipidemia    Colon polyp    Essential hypertension    Class 1 obesity due to excess calories without serious comorbidity with body mass index (BMI) of 33.0 to 33.9 in adult    History of colon cancer, stage I    Benign neoplasm of skin of face    Ganglion    Hypertrophic and atrophic condition of skin    Lumbar stenosis    Vitamin D deficiency    Family history of type 2 diabetes mellitus    Carotid artery calcification, bilateral    Prediabetes    Atherosclerosis of abdominal aorta (HCC)    RUQ pain    Pre-op evaluation    Primary osteoarthritis of right knee       Review of Systems   Constitutional: Negative. Negative for fever. HENT: Negative. Eyes: Negative. Respiratory: Negative. Negative for shortness of breath. Cardiovascular:  Negative for chest pain and palpitations.         Hypertension mother with coronary artery bypass surgery and carotid endarterectomy history   Gastrointestinal:  Negative for abdominal pain. Colon cancer of age 47 and last colonoscopy 2021 and due in 2026   Endocrine:        History of type 2 diabetes in her brother and hyperlipidemia   Genitourinary:  Negative for dysuria and pelvic pain. No Pap smears until age 71 and told does not need any more   Musculoskeletal:  Positive for back pain. Negative for myalgias and neck pain. Moderate to severe lumbar spinal stenosis at multiple levels and sciatica of left leg    Advanced osteoarthritis of right knee with chronic swelling and deformity and awaiting knee replacement, history of bursitis left hip   Allergic/Immunologic: Negative. Neurological:  Negative for weakness, numbness and headaches. Sciatica of left leg   Hematological: Negative. Psychiatric/Behavioral: Negative. Prior to Visit Medications    Medication Sig Taking? Authorizing Provider   valACYclovir (VALTREX) 1 g tablet Take 1 tablet by mouth 2 times daily For one week as needed. Yes Liu Olson MD   lisinopril-hydroCHLOROthiazide (PRINZIDE;ZESTORETIC) 20-12.5 MG per tablet Take 1 tablet daily by mouth, okay to separate , if do not have in stock. Yes Liu Olson MD   rosuvastatin (CRESTOR) 20 MG tablet Take 1 tablet by mouth daily Cancel pravastatin script.  Yes Liu Olson MD   ibuprofen (ADVIL;MOTRIN) 600 MG tablet Take 600 mg by mouth every 6 hours as needed Yes Historical Provider, MD        Allergies   Allergen Reactions    Sulfa Antibiotics        Past Medical History:   Diagnosis Date    Colon cancer ([facility] [address] 10/22/2012    Colon polyp 10/22/2012    Essential hypertension 3/27/2015    HTN (hypertension) 3/27/2015    Hyperlipidemia 9/16/2011    Mixed hyperlipidemia 9/16/2011       Past Surgical History:   Procedure Laterality Date    APPENDECTOMY      COLONOSCOPY      2017       Social History     Socioeconomic History    Marital status:      Spouse name: Not on file    Number of children: Not on ill-appearing, toxic-appearing or diaphoretic. HENT:      Head: Normocephalic and atraumatic. Nose: Nose normal.   Eyes:      Extraocular Movements: Extraocular movements intact. Conjunctiva/sclera: Conjunctivae normal.      Pupils: Pupils are equal, round, and reactive to light. Neck:      Vascular: No carotid bruit. Cardiovascular:      Rate and Rhythm: Normal rate and regular rhythm. Pulses: Normal pulses. Heart sounds: Normal heart sounds. Pulmonary:      Effort: Pulmonary effort is normal.      Breath sounds: Normal breath sounds. Abdominal:      General: Abdomen is flat. Bowel sounds are normal. There is no distension. Palpations: Abdomen is soft. There is no mass. Tenderness: There is no abdominal tenderness. There is no right CVA tenderness, left CVA tenderness, guarding or rebound. Hernia: No hernia is present. Musculoskeletal:         General: Swelling, tenderness and deformity present. Cervical back: Normal range of motion and neck supple. No rigidity or tenderness. Comments: Swelling and deformity of the right knee. Lymphadenopathy:      Cervical: No cervical adenopathy. Skin:     General: Skin is warm and dry. Neurological:      General: No focal deficit present. Mental Status: She is alert and oriented to person, place, and time. Psychiatric:         Mood and Affect: Mood normal.         Behavior: Behavior normal.         Thought Content: Thought content normal.         Judgment: Judgment normal.       No flowsheet data found.     Lab Results   Component Value Date/Time    CHOL 180 02/15/2022 09:38 AM    CHOL 187 03/26/2019 09:01 AM    CHOL 192 03/14/2017 08:52 AM    CHOLFAST 187 09/22/2020 10:44 AM    TRIG 97 02/15/2022 09:38 AM    TRIG 125 03/26/2019 09:01 AM    TRIG 169 03/14/2017 08:52 AM    TRIGLYCFAST 185 09/22/2020 10:44 AM    HDL 71 02/15/2022 09:38 AM    HDL 61 09/22/2020 10:44 AM    HDL 67 03/26/2019 09:01 AM    HDL 55 09/16/2011 10:34 AM    LDLCALC 90 02/15/2022 09:38 AM    LDLCALC 89 09/22/2020 10:44 AM    LDLCALC 95 03/26/2019 09:01 AM    GLUCOSE 95 06/14/2022 04:14 PM    LABA1C 5.6 02/15/2022 09:38 AM    LABA1C 5.9 09/22/2020 10:44 AM       The 10-year ASCVD risk score (Yecenia Montana, et al., 2013) is: 8.9%    Values used to calculate the score:      Age: 66 years      Sex: Female      Is Non- : No      Diabetic: No      Tobacco smoker: No      Systolic Blood Pressure: 98 mmHg      Is BP treated: Yes      HDL Cholesterol: 71 mg/dL      Total Cholesterol: 180 mg/dL    Immunization History   Administered Date(s) Administered    COVID-19, PFIZER PURPLE top, DILUTE for use, (age 15 y+), 30mcg/0.3mL 02/24/2021, 03/17/2021, 12/14/2021    Influenza, Quadv, adjuvanted, 65 yrs +, IM, PF (Fluad) 02/15/2022    Pneumococcal Conjugate 13-valent (Uuzawos04) 04/10/2015    Pneumococcal Conjugate 7-valent (Prevnar7) 09/14/2015    Pneumococcal Polysaccharide (Fceuonlob74) 03/07/2017    Td, unspecified formulation 10/18/2010       Health Maintenance   Topic Date Due    Shingles vaccine (1 of 2) Never done    DTaP/Tdap/Td vaccine (1 - Tdap) 10/19/2010    COVID-19 Vaccine (4 - Booster for Pfizer series) 04/14/2022    Flu vaccine (1) 09/01/2022    A1C test (Diabetic or Prediabetic)  02/15/2023    Lipids  02/15/2023    Depression Screen  06/14/2023    Annual Wellness Visit (AWV)  06/15/2023    Breast cancer screen  08/31/2023    Colorectal Cancer Screen  09/05/2024    DEXA (modify frequency per FRAX score)  Completed    Pneumococcal 65+ years Vaccine  Completed    Hepatitis C screen  Completed    Hepatitis A vaccine  Aged Out    Hepatitis B vaccine  Aged Out    Hib vaccine  Aged Out    Meningococcal (ACWY) vaccine  Aged Out       Assessment & Plan   Pre-op evaluation, stable for surgery. Hold lisinopril am of surgery. No NSAID's or vitamins or aspirin one week prior.     Primary osteoarthritis of right knee  -     EKG 12 Lead  Return in about 4 weeks (around 9/5/2022).          --Lefty Diaz MD

## 2022-08-08 NOTE — TELEPHONE ENCOUNTER
General Question     Subject: COLD THERAPY UNIT  Patient and /or Facility Request: Lucyaleksandra Choeanthony  Contact Number: [phone]    PATIENT IS REQ A RETURN CALL REGARDING COLD THERAPY UNIT, IS UNABLE TO REACH ANYONE REGARDING THIS.

## 2022-08-09 LAB — MRSA SCREEN RT-PCR: NORMAL

## 2022-08-10 NOTE — PROGRESS NOTES
PRE-OP INSTRUCTIONS     Do not eat or drink anything after 12:00 midnight prior to surgery. This includes water, chewing gum, mints and ice chips. You may brush your teeth and gargle the morning of surgery but DO  NOT SWALLOW THE WATER. Take the following medications with a small sip of water on the morning of procedure. Mayelin Carbajal Follow your MD/Surgeons pre-procedure instructions regarding your medications     You may be asked to stop blood thinners such as:  Coumadin, Plavix, Fragmin and lovenox. Please check with your doctor before stopping these or any other medications. Aspirin, ibuprofen, advil and naproxen, any anti-inflammatory products should be stopped for a week prior to your surgery. Do not smoke and do not drink any alcoholic beverages 24 hours prior to your surgery. Please do not wear any jewelry or body piercings on the day of surgery. Please wear something simple, loose fitting clothing to the hospital.  Do not wear any make-up(including eye make-up) or nail polish on your fingers and toes. As part of our patient safety program to minimize surgical infections, we ask you to do the following:    Please notify your surgeon if you develop any illness between now and the day of your surgery. This includes a cough, cold, fever, sore  throat, nausea, vomiting, diarrhea, etc.   Please notify your surgeon if you experience dizziness, shortness of  breath or blurred vision between now and the time of your surgery. Please notify your surgeon of any open or redden areas that may look infected       DO NOT shave your operative site 96 hours(four days) prior to surgery. Shower the week before surgery with an antibacterial soap such as:dial,safeguard, etc.       Three(3) days prior to your surgery, cleanse the operative site with Hibiclens(anti-microbial soap).  This soap may dry your skin, please do not apply any oils or lotions     Please bring your insurance card and picture ID day of surgery    If you have a living will or durable power of attorny. Please bring in a copy of you advanced directives. If you have dentures, they will be removed before going to the OR, we will provide you with a container. If you wear contact lenses/glasses, they will be removes, please bring a case    Have you seen your family doctor for a pre-op history and physical.      Surgery scheduler will call you 48 hours prior to your surgery to notify you of the time of your surgery and the time you will need to be at hospital...patients are asked to arrive 2 1/2 hours prior to surgery. Please call Pre-Admission testing if you have any further questions. [id number] [facility] [place], [place] testing phone number:  [phone]      Thank You for choosing [facility]!!      C-Difficile admission screening and protocol:       * Admitted with diarrhea? [] YES    [x]  NO     *Prior history of C-Diff. In last 3 months? [] YES    [x]  NO     *Antibiotic use in the past 6-8 weeks? [x]  NO    []  YES                 If yes, which ANTIBIOTIC AND REASON______     *Prior hospitalization or nursing home in the last month? []  YES    [x]  NO        SAFETY FIRST. .call before you fall

## 2022-08-12 ENCOUNTER — OFFICE VISIT (OUTPATIENT)
Dept: ORTHOPEDIC SURGERY | Age: 72
End: 2022-08-12
Payer: MEDICARE

## 2022-08-12 VITALS — WEIGHT: 197 LBS | HEIGHT: 67 IN | BODY MASS INDEX: 30.92 KG/M2 | RESPIRATION RATE: 16 BRPM

## 2022-08-12 DIAGNOSIS — Z96.651 TOTAL KNEE REPLACEMENT STATUS, RIGHT: Primary | ICD-10-CM

## 2022-08-12 PROCEDURE — G8417 CALC BMI ABV UP PARAM F/U: HCPCS | Performed by: ORTHOPAEDIC SURGERY

## 2022-08-12 PROCEDURE — G8427 DOCREV CUR MEDS BY ELIG CLIN: HCPCS | Performed by: ORTHOPAEDIC SURGERY

## 2022-08-12 PROCEDURE — 1090F PRES/ABSN URINE INCON ASSESS: CPT | Performed by: ORTHOPAEDIC SURGERY

## 2022-08-12 PROCEDURE — 3017F COLORECTAL CA SCREEN DOC REV: CPT | Performed by: ORTHOPAEDIC SURGERY

## 2022-08-12 PROCEDURE — G8399 PT W/DXA RESULTS DOCUMENT: HCPCS | Performed by: ORTHOPAEDIC SURGERY

## 2022-08-12 PROCEDURE — 1036F TOBACCO NON-USER: CPT | Performed by: ORTHOPAEDIC SURGERY

## 2022-08-12 PROCEDURE — 99214 OFFICE O/P EST MOD 30 MIN: CPT | Performed by: ORTHOPAEDIC SURGERY

## 2022-08-12 PROCEDURE — 1123F ACP DISCUSS/DSCN MKR DOCD: CPT | Performed by: ORTHOPAEDIC SURGERY

## 2022-08-12 NOTE — DISCHARGE INSTR - COC
[facility] ([facility]) Continuity of Care Form    Patient Name:  Chloe Eric  : 1950    MRN:  [id number]    Admit date:  (Not on file)  Discharge date:  2022    Code Status Order: No Order  Advance Directives: No    Admitting Physician: Jarrell Wilder MD  PCP: Cesar Coronado MD    Discharging Nurse: [facility] Unit/Room#: No information available for this encounter. Discharging Unit Phone Number: [phone]    Emergency Contact:        Past Surgical History:  Past Surgical History:   Procedure Laterality Date    APPENDECTOMY      COLECTOMY      COLONOSCOPY             Immunization History:   Immunization History   Administered Date(s) Administered    COVID-19, PFIZER PURPLE top, DILUTE for use, (age 15 y+), 30mcg/0.3mL 2021, 2021, 2021    Influenza, Quadv, adjuvanted, 65 yrs +, IM, PF (Fluad) 02/15/2022    Pneumococcal Conjugate 13-valent (Mvfouxt62) 04/10/2015    Pneumococcal Conjugate 7-valent (Prevnar7) 2015    Pneumococcal Polysaccharide (Emiayerdx80) 2017    Td, unspecified formulation 10/18/2010       Active Problems:  Active Problems:    * No active hospital problems. *  Resolved Problems:    * No resolved hospital problems. *      Isolation/Infection:       Nurse Assessment:  Last Vital Signs:Ht 5' 6.5\" (1.689 m)   Wt 197 lb (89.4 kg)   BMI 31.32 kg/m²   Last documented pain score (0-10 scale):    Last Weight:   Wt Readings from Last 1 Encounters:   22 197 lb (89.4 kg)     Mental Status:  oriented and alert     IV Access:  - None    Nursing Mobility/ADLs:  Walking   Assisted  Transfer  Assisted  Bathing  Assisted  Dressing  Assisted  Toileting  Assisted  Feeding  Independent  Med Admin  Independent  Med Delivery   whole    Wound Care Documentation and Therapy:  Keep glued Prineo dressing clean and intact. DO NOT remove. Prineo is waterproof for showering.  Doctor will evaluated dressing for removal at office visit 2 weeks after surgery        Elimination:  Urinary Catheter: None   Colostomy/Ileostomy: No  Continence: Bowel: Yes  Bladder: Yes  Date of Last BM: 8/16/2022    Intake/Output Summary (Last 24 hours)   No intake or output data in the 24 hours ending 08/12/22 1120  Safety Concerns: At Risk for Falls    Impairments/Disabilities:      None    Nutrition Therapy:  Current Nutrition Therapy: general  Routes of Feeding: Oral  Liquids: No Restrictions  Daily Fluid Restriction: no  Last Modified Barium Swallow with Video (Video Swallowing Test): not done    Treatments at the Time of Hospital Discharge:   Respiratory Treatments:   Oxygen Therapy:  is not on home oxygen therapy.   Ventilator:    - No ventilator support    Lab orders for discharge:        Rehab Therapies: Physical Therapy, Occupational Therapy and nursing care  Weight Bearing Status/Restrictions: No weight bearing restirctions  Other Medical Equipment (for information only, NOT a DME order):  Rolling walker  Other Treatments: ASA 81mg twice at day for 14 days for DVT prophylaxis , bilateral DION hose for 2 weeks after surgery  HOME HEALTH CARE: [facility] [address] [place] [address] to establish plan of care for patient over 60 day period   Nursing  Initial home SN evaluation visit to occur within 24-48 hours for:  medication management  VS and clinical assessment  S&S chronic disease exacerbation education + when to contact MD / NP  care coordination  Medication Reconciliation during 1st SN visit       PT/OT   Evaluate with goal of regaining prior level of functioning   OT to evaluate if patient has [address] needs for personal care      PCP Visit scheduled within 7 days of hospital discharge       Patient's personal belongings (please select all that are sent with patient):  Dentures upper and lower    RN SIGNATURE:  Electronically signed by Noy Sanchez RN on 8/18/22 at 8:34 AM EDT    PHYSICIAN SECTION    Prognosis: Good    Condition at Discharge: Stable    Rehab Potential (if transferring to Rehab): Good    Physician Certification: I certify the above orders, information, and transfer of Steven Hidalgo is necessary for the continuing treatment of the diagnosis listed and that he requires [address] for less 30 days. Update Admission H&P: No change in H&P    PHYSICIAN SIGNATURE:  Electronically signed by FRANKLIN Pugh CNP on 8/12/22 at 11:20 AM EDT/ Dr Rosa Caceres Date: ***    [facility]:    Discharging to Facility/ Agency   Name:     [facility]  Address:  [address] [place] [address],  [address],  [address] [facility] [address]  Phone:     [phone]  Fax:         [phone]      Dialysis Facility (if applicable)   Name:  Address:  Dialysis Schedule:  Phone:  Fax:    / signature: {Esignature:[id number]}          Followup with Dr Alessandro Cruz 2 weeks after surgery in office   [phone]  [facility] and [facility], [address] [place] [address], [address] [place] [address],   [phone]     DISCHARGE INSTRUCTIONS FOR TOTAL KNEE REPLACEMENT  Activity:  Elevate your leg if swelling occurs in your ankle. Use elastic wraps/hose until swelling decreases. Continue the exercise program as prescribed by physical therapists. Take frequent walks. Use walker, crutches, or cane with weight bearing instructions as indicated by the physical therapists. Take rest periods often. Elevate leg during rest period. Wound Care:  Cover the wound with a sterile gauze dressing and change daily as long as there is drainage. Do not scrub wound. Pat it dry with a soft towel. Dont apply any lotions or creams to your wound. Check the incision every day for redness, swelling, or increase in drainage. Diet:  You can resume your normal diet. There are no limits on your diet due to your surgery. Pain pills and activity changes may lead to constipation.   To prevent this, use prune juice or bran cereals liberally. You may need to use a laxative such as Dulcolax, Senokot, or Milk of Magnesia. Drink plenty of fluids. Medications: Take pain pills if needed to maintain comfort. Never drive while taking pain medicine. Avoid over the counter medications until checking with your doctor. Resume previous medications as instructed by your doctor. You will be on aspirin or Eliquis  for 14 days only. Stay off other anti-inflammatory medications (except Celebrex)  Call Your Doctor If:  You have increased pain not controlled by medications. Excessive swelling in your ankle. You develop numbness, tingling, or decreased movement. You have a fever greater than 100 degrees for a day or over 101 degrees at any one time. Your wound becomes more reddened, starts draining, or opens. If you fall. You have any questions about your recovery. Inform your family doctor/dentist or any other doctor who cares for you in the future that you have a joint replacement. You may need antibiotics for dental or surgical procedures if there is any evidence of infection present. If you have required the use of insulin to control your blood sugar after surgery, follow up with your family doctor. Call your surgeons office to schedule your appointment to be seen after surgery. Make your appointment to continue physical therapy per doctors orders.   Smoking cessation assistance can be obtained from your family doctor or by calling [place] @ [phone]    _______________________________   _____   _______________________  ____                Patient Signature              Date      Witness                               Date

## 2022-08-12 NOTE — PROGRESS NOTES
[facility]  Office Visit    Chief Complaint: Right knee pain    HPI:  Rayray Still is a 66 y.o. who is here in follow-up of right knee pain due to osteoarthritis. She is scheduled for right total knee arthroplasty next week. For review, has a history of bilateral knee pain. She does have a recent history of right knee arthroscopy performed in September at [facility] by Dr. Ash Ramos. She continued to have right knee pain following her surgery so she was treated with viscosupplementation injection in November 2021. This mildly helped with symptoms. In the interim, she reports she was worked up and treated for left hip bursitis as well as lumbar spine stenosis. She was treated at [address] [place] [address] with an epidural steroid injection and physical therapy. Her right knee has been painful at all time and causes difficulty in walking. She reports that it feels tight in her right knee. She began having left knee pain just yesterday. There is no injury or surgery history for the left knee. Pain is mostly anterior and medial.  She reports that both legs feel weak and she has trouble walking or standing long periods of time due to the weak feeling. She has been taking ibuprofen and gabapentin to help with pain. She denies diabetes, history of blood clots, blood thinners, tobacco use, narcotic pain medications and sleep apnea. She does have help at home. She works full-time. She walks without assistive device.       Patient Active Problem List   Diagnosis    Mixed hyperlipidemia    Colon polyp    Essential hypertension    Class 1 obesity due to excess calories without serious comorbidity with body mass index (BMI) of 33.0 to 33.9 in adult    History of colon cancer, stage I    Benign neoplasm of skin of face    Ganglion    Hypertrophic and atrophic condition of skin    Lumbar stenosis    Vitamin D deficiency    Family history of type 2 diabetes mellitus    Carotid artery calcification, bilateral    Prediabetes    Atherosclerosis of abdominal aorta (HCC)    RUQ pain    Pre-op evaluation    Primary osteoarthritis of right knee       ROS:  Constitutional: denies fever, chills, weight loss  MSK: denies pain in other joints, muscle aches  Neurological: denies numbness, tingling, weakness    Exam:  Height 5' 6.5\" (1.689 m), weight 195 lb (88.5 kg)    Appearance: sitting in exam room chair, appears to be in no acute distress, awake and alert  Resp: unlabored breathing on room air  Skin: warm, dry and intact with out erythema or significant increased temperature  Neuro: grossly intact both lower extremities. Intact sensation to light touch. Motor exam 4+ to 5/5 in all major motor groups. Bilateral knees: Negative Stinchfield at the hip. Examination reveals that knee range of motion is 0 to 130 degrees. There is varus deformity, positive crepitus, positive joint line tenderness, positive antalgic gait. Neurologically, plantar flexion and dorsiflexion is intact. 5/5 strength. Imaging:  Prior right knee radiographs reviewed today. She has tricompartmental degenerative changes bilaterally with near bone-on-bone of medial compartment. There are periarticular osteophytes. Assessment:  Right knee osteoarthritis    Plan: We further discussed right total knee arthroplasty. The operative procedure, alternatives, and risks were discussed in detail with the patient. The risks include but are not limited to: Infection, vessel injury, nerve injury, DVT, pulmonary embolism, implant loosening, need for revision surgery, loss of motion, continued pain. Despite these risks the patient would like to proceed. All questions have been answered and no guarantees have been made. The patient is unable to do further physical therapy due to disabling pain. I discussed with the patient the diagnosis in detail and answered all the questions.   The patient verbalized understanding of the plan as it has been described above and is in agreement. Plan for right total knee arthroplasty. We also discussed her symptoms of leg weakness and her lumbar spine canal stenosis. I recommended she return to [facility] for further treatment as knee replacement will likely not help her leg weakness. Total time spent on today's encounter was at least 31 minutes. This time included reviewing prior notes, radiographs, and lab results when available, reviewing history obtained by medical assistant, performing history and physical exam, reviewing tests/radiographs with the patient, counseling the patient, ordering medications or tests, documentation in the electronic health record, and coordination of care. This dictation was done with [employer] dictation and may contain mechanical errors related to translation.

## 2022-08-16 ENCOUNTER — ANESTHESIA EVENT (OUTPATIENT)
Dept: OPERATING ROOM | Age: 72
End: 2022-08-16
Payer: MEDICARE

## 2022-08-17 ENCOUNTER — ANESTHESIA (OUTPATIENT)
Dept: OPERATING ROOM | Age: 72
End: 2022-08-17
Payer: MEDICARE

## 2022-08-17 ENCOUNTER — HOSPITAL ENCOUNTER (OUTPATIENT)
Age: 72
Setting detail: OBSERVATION
Discharge: HOME | End: 2022-08-18
Attending: ORTHOPAEDIC SURGERY | Admitting: ORTHOPAEDIC SURGERY
Payer: MEDICARE

## 2022-08-17 ENCOUNTER — APPOINTMENT (OUTPATIENT)
Dept: GENERAL RADIOLOGY | Age: 72
End: 2022-08-17
Attending: ORTHOPAEDIC SURGERY
Payer: MEDICARE

## 2022-08-17 DIAGNOSIS — E78.2 MIXED HYPERLIPIDEMIA: ICD-10-CM

## 2022-08-17 DIAGNOSIS — M17.11 ARTHRITIS OF RIGHT KNEE: Primary | ICD-10-CM

## 2022-08-17 LAB
ABO/RH: NORMAL
ANTIBODY SCREEN: NORMAL
GLUCOSE BLD-MCNC: 117 MG/DL (ref 70–99)
GLUCOSE BLD-MCNC: 122 MG/DL (ref 70–99)
PERFORMED ON: ABNORMAL
PERFORMED ON: ABNORMAL

## 2022-08-17 PROCEDURE — 7100000001 HC PACU RECOVERY - ADDTL 15 MIN: Performed by: ORTHOPAEDIC SURGERY

## 2022-08-17 PROCEDURE — 6360000002 HC RX W HCPCS: Performed by: ANESTHESIOLOGY

## 2022-08-17 PROCEDURE — 2500000003 HC RX 250 WO HCPCS

## 2022-08-17 PROCEDURE — 97530 THERAPEUTIC ACTIVITIES: CPT

## 2022-08-17 PROCEDURE — A4217 STERILE WATER/SALINE, 500 ML: HCPCS | Performed by: ORTHOPAEDIC SURGERY

## 2022-08-17 PROCEDURE — C9290 INJ, BUPIVACAINE LIPOSOME: HCPCS | Performed by: ORTHOPAEDIC SURGERY

## 2022-08-17 PROCEDURE — 3700000000 HC ANESTHESIA ATTENDED CARE: Performed by: ORTHOPAEDIC SURGERY

## 2022-08-17 PROCEDURE — 73560 X-RAY EXAM OF KNEE 1 OR 2: CPT

## 2022-08-17 PROCEDURE — 64447 NJX AA&/STRD FEMORAL NRV IMG: CPT | Performed by: ANESTHESIOLOGY

## 2022-08-17 PROCEDURE — G0378 HOSPITAL OBSERVATION PER HR: HCPCS

## 2022-08-17 PROCEDURE — C1776 JOINT DEVICE (IMPLANTABLE): HCPCS | Performed by: ORTHOPAEDIC SURGERY

## 2022-08-17 PROCEDURE — 6370000000 HC RX 637 (ALT 250 FOR IP): Performed by: ORTHOPAEDIC SURGERY

## 2022-08-17 PROCEDURE — 2580000003 HC RX 258: Performed by: ANESTHESIOLOGY

## 2022-08-17 PROCEDURE — 97535 SELF CARE MNGMENT TRAINING: CPT

## 2022-08-17 PROCEDURE — 86901 BLOOD TYPING SEROLOGIC RH(D): CPT

## 2022-08-17 PROCEDURE — 2709999900 HC NON-CHARGEABLE SUPPLY: Performed by: ORTHOPAEDIC SURGERY

## 2022-08-17 PROCEDURE — 27447 TOTAL KNEE ARTHROPLASTY: CPT | Performed by: PHYSICIAN ASSISTANT

## 2022-08-17 PROCEDURE — 97166 OT EVAL MOD COMPLEX 45 MIN: CPT

## 2022-08-17 PROCEDURE — 6360000002 HC RX W HCPCS: Performed by: ORTHOPAEDIC SURGERY

## 2022-08-17 PROCEDURE — 2580000003 HC RX 258: Performed by: ORTHOPAEDIC SURGERY

## 2022-08-17 PROCEDURE — 2500000003 HC RX 250 WO HCPCS: Performed by: ANESTHESIOLOGY

## 2022-08-17 PROCEDURE — 97162 PT EVAL MOD COMPLEX 30 MIN: CPT

## 2022-08-17 PROCEDURE — 27447 TOTAL KNEE ARTHROPLASTY: CPT | Performed by: ORTHOPAEDIC SURGERY

## 2022-08-17 PROCEDURE — 86850 RBC ANTIBODY SCREEN: CPT

## 2022-08-17 PROCEDURE — 20985 CPTR-ASST DIR MS PX: CPT | Performed by: ORTHOPAEDIC SURGERY

## 2022-08-17 PROCEDURE — 6360000002 HC RX W HCPCS

## 2022-08-17 PROCEDURE — 2700000000 HC OXYGEN THERAPY PER DAY

## 2022-08-17 PROCEDURE — 7100000000 HC PACU RECOVERY - FIRST 15 MIN: Performed by: ORTHOPAEDIC SURGERY

## 2022-08-17 PROCEDURE — 2720000010 HC SURG SUPPLY STERILE: Performed by: ORTHOPAEDIC SURGERY

## 2022-08-17 PROCEDURE — 94761 N-INVAS EAR/PLS OXIMETRY MLT: CPT

## 2022-08-17 PROCEDURE — 3600000005 HC SURGERY LEVEL 5 BASE: Performed by: ORTHOPAEDIC SURGERY

## 2022-08-17 PROCEDURE — 6370000000 HC RX 637 (ALT 250 FOR IP): Performed by: ANESTHESIOLOGY

## 2022-08-17 PROCEDURE — 3600000015 HC SURGERY LEVEL 5 ADDTL 15MIN: Performed by: ORTHOPAEDIC SURGERY

## 2022-08-17 PROCEDURE — 86900 BLOOD TYPING SEROLOGIC ABO: CPT

## 2022-08-17 PROCEDURE — 3700000001 HC ADD 15 MINUTES (ANESTHESIA): Performed by: ORTHOPAEDIC SURGERY

## 2022-08-17 DEVICE — COMPONENT PAT DIA32MM THK10MM SUPERIOR/INFERIOR KNEE: Type: IMPLANTABLE DEVICE | Site: KNEE | Status: FUNCTIONAL

## 2022-08-17 DEVICE — BASEPLATE TIB SZ 5 AP49MM ML74MM KNEE TRITANIUM 4 CRUCFRM: Type: IMPLANTABLE DEVICE | Site: KNEE | Status: FUNCTIONAL

## 2022-08-17 DEVICE — IMPLANTABLE DEVICE: Type: IMPLANTABLE DEVICE | Site: KNEE | Status: FUNCTIONAL

## 2022-08-17 DEVICE — INSERT TIB CNDYL STBL 5 9 MM KNEE 5/PK X3 TRIATHLON: Type: IMPLANTABLE DEVICE | Site: KNEE | Status: FUNCTIONAL

## 2022-08-17 RX ORDER — SUCCINYLCHOLINE/SOD CL,ISO/PF 200MG/10ML
SYRINGE (ML) INTRAVENOUS PRN
Status: DISCONTINUED | OUTPATIENT
Start: 2022-08-17 | End: 2022-08-17 | Stop reason: SDUPTHER

## 2022-08-17 RX ORDER — SODIUM CHLORIDE 9 MG/ML
INJECTION, SOLUTION INTRAVENOUS PRN
Status: DISCONTINUED | OUTPATIENT
Start: 2022-08-17 | End: 2022-08-18 | Stop reason: HOSPADM

## 2022-08-17 RX ORDER — ACETAMINOPHEN 325 MG/1
650 TABLET ORAL EVERY 6 HOURS
Status: DISCONTINUED | OUTPATIENT
Start: 2022-08-17 | End: 2022-08-18 | Stop reason: HOSPADM

## 2022-08-17 RX ORDER — ONDANSETRON 2 MG/ML
4 INJECTION INTRAMUSCULAR; INTRAVENOUS
Status: COMPLETED | OUTPATIENT
Start: 2022-08-17 | End: 2022-08-17

## 2022-08-17 RX ORDER — OXYCODONE HYDROCHLORIDE 5 MG/1
5 TABLET ORAL EVERY 4 HOURS PRN
Status: DISCONTINUED | OUTPATIENT
Start: 2022-08-17 | End: 2022-08-18 | Stop reason: HOSPADM

## 2022-08-17 RX ORDER — ASPIRIN 81 MG/1
81 TABLET ORAL 2 TIMES DAILY
Status: DISCONTINUED | OUTPATIENT
Start: 2022-08-17 | End: 2022-08-18 | Stop reason: HOSPADM

## 2022-08-17 RX ORDER — MAGNESIUM HYDROXIDE 1200 MG/15ML
LIQUID ORAL CONTINUOUS PRN
Status: DISCONTINUED | OUTPATIENT
Start: 2022-08-17 | End: 2022-08-17 | Stop reason: HOSPADM

## 2022-08-17 RX ORDER — DROPERIDOL 2.5 MG/ML
0.62 INJECTION, SOLUTION INTRAMUSCULAR; INTRAVENOUS
Status: COMPLETED | OUTPATIENT
Start: 2022-08-17 | End: 2022-08-17

## 2022-08-17 RX ORDER — OXYCODONE HYDROCHLORIDE 5 MG/1
5-10 TABLET ORAL EVERY 6 HOURS PRN
Qty: 40 TABLET | Refills: 0 | Status: SHIPPED | OUTPATIENT
Start: 2022-08-17 | End: 2022-08-29 | Stop reason: SDUPTHER

## 2022-08-17 RX ORDER — DEXAMETHASONE SODIUM PHOSPHATE 4 MG/ML
INJECTION, SOLUTION INTRA-ARTICULAR; INTRALESIONAL; INTRAMUSCULAR; INTRAVENOUS; SOFT TISSUE PRN
Status: DISCONTINUED | OUTPATIENT
Start: 2022-08-17 | End: 2022-08-17 | Stop reason: SDUPTHER

## 2022-08-17 RX ORDER — ROSUVASTATIN CALCIUM 20 MG/1
20 TABLET, COATED ORAL DAILY
Qty: 30 TABLET | Refills: 0
Start: 2022-08-17 | End: 2022-09-09

## 2022-08-17 RX ORDER — MORPHINE SULFATE 2 MG/ML
2 INJECTION, SOLUTION INTRAMUSCULAR; INTRAVENOUS
Status: DISCONTINUED | OUTPATIENT
Start: 2022-08-17 | End: 2022-08-18 | Stop reason: HOSPADM

## 2022-08-17 RX ORDER — SODIUM CHLORIDE 0.9 % (FLUSH) 0.9 %
5-40 SYRINGE (ML) INJECTION PRN
Status: DISCONTINUED | OUTPATIENT
Start: 2022-08-17 | End: 2022-08-18 | Stop reason: HOSPADM

## 2022-08-17 RX ORDER — PROPOFOL 10 MG/ML
INJECTION, EMULSION INTRAVENOUS PRN
Status: DISCONTINUED | OUTPATIENT
Start: 2022-08-17 | End: 2022-08-17 | Stop reason: SDUPTHER

## 2022-08-17 RX ORDER — KETOROLAC TROMETHAMINE 15 MG/ML
15 INJECTION, SOLUTION INTRAMUSCULAR; INTRAVENOUS
Status: ACTIVE | OUTPATIENT
Start: 2022-08-17 | End: 2022-08-17

## 2022-08-17 RX ORDER — SODIUM CHLORIDE 0.9 % (FLUSH) 0.9 %
5-40 SYRINGE (ML) INJECTION PRN
Status: DISCONTINUED | OUTPATIENT
Start: 2022-08-17 | End: 2022-08-17 | Stop reason: HOSPADM

## 2022-08-17 RX ORDER — SCOLOPAMINE TRANSDERMAL SYSTEM 1 MG/1
1 PATCH, EXTENDED RELEASE TRANSDERMAL
Qty: 2 PATCH | Refills: 0 | Status: SHIPPED | OUTPATIENT
Start: 2022-08-17 | End: 2022-09-27

## 2022-08-17 RX ORDER — BUPIVACAINE HYDROCHLORIDE 2.5 MG/ML
INJECTION, SOLUTION INFILTRATION; PERINEURAL
Status: DISCONTINUED | OUTPATIENT
Start: 2022-08-17 | End: 2022-08-17 | Stop reason: SDUPTHER

## 2022-08-17 RX ORDER — LIDOCAINE HYDROCHLORIDE 20 MG/ML
INJECTION, SOLUTION EPIDURAL; INFILTRATION; INTRACAUDAL; PERINEURAL PRN
Status: DISCONTINUED | OUTPATIENT
Start: 2022-08-17 | End: 2022-08-17 | Stop reason: SDUPTHER

## 2022-08-17 RX ORDER — OXYCODONE HYDROCHLORIDE 10 MG/1
10 TABLET ORAL ONCE
Status: COMPLETED | OUTPATIENT
Start: 2022-08-17 | End: 2022-08-17

## 2022-08-17 RX ORDER — MELOXICAM 7.5 MG/1
7.5 TABLET ORAL DAILY
Qty: 5 TABLET | Refills: 0 | Status: SHIPPED | OUTPATIENT
Start: 2022-08-18 | End: 2022-09-12

## 2022-08-17 RX ORDER — ONDANSETRON 2 MG/ML
INJECTION INTRAMUSCULAR; INTRAVENOUS PRN
Status: DISCONTINUED | OUTPATIENT
Start: 2022-08-17 | End: 2022-08-17 | Stop reason: SDUPTHER

## 2022-08-17 RX ORDER — SCOLOPAMINE TRANSDERMAL SYSTEM 1 MG/1
1 PATCH, EXTENDED RELEASE TRANSDERMAL ONCE
Status: DISCONTINUED | OUTPATIENT
Start: 2022-08-17 | End: 2022-08-18 | Stop reason: HOSPADM

## 2022-08-17 RX ORDER — ONDANSETRON 2 MG/ML
4 INJECTION INTRAMUSCULAR; INTRAVENOUS EVERY 6 HOURS PRN
Status: DISCONTINUED | OUTPATIENT
Start: 2022-08-17 | End: 2022-08-18 | Stop reason: HOSPADM

## 2022-08-17 RX ORDER — INSULIN LISPRO 100 [IU]/ML
0-4 INJECTION, SOLUTION INTRAVENOUS; SUBCUTANEOUS
Status: DISCONTINUED | OUTPATIENT
Start: 2022-08-17 | End: 2022-08-18

## 2022-08-17 RX ORDER — SODIUM CHLORIDE 0.9 % (FLUSH) 0.9 %
5-40 SYRINGE (ML) INJECTION EVERY 12 HOURS SCHEDULED
Status: DISCONTINUED | OUTPATIENT
Start: 2022-08-17 | End: 2022-08-18 | Stop reason: HOSPADM

## 2022-08-17 RX ORDER — SODIUM CHLORIDE 9 MG/ML
INJECTION, SOLUTION INTRAVENOUS PRN
Status: DISCONTINUED | OUTPATIENT
Start: 2022-08-17 | End: 2022-08-17 | Stop reason: HOSPADM

## 2022-08-17 RX ORDER — ONDANSETRON 4 MG/1
4 TABLET, ORALLY DISINTEGRATING ORAL EVERY 8 HOURS PRN
Status: DISCONTINUED | OUTPATIENT
Start: 2022-08-17 | End: 2022-08-18 | Stop reason: HOSPADM

## 2022-08-17 RX ORDER — FENTANYL CITRATE 50 UG/ML
INJECTION, SOLUTION INTRAMUSCULAR; INTRAVENOUS PRN
Status: DISCONTINUED | OUTPATIENT
Start: 2022-08-17 | End: 2022-08-17 | Stop reason: SDUPTHER

## 2022-08-17 RX ORDER — MELOXICAM 7.5 MG/1
7.5 TABLET ORAL ONCE
Status: COMPLETED | OUTPATIENT
Start: 2022-08-17 | End: 2022-08-17

## 2022-08-17 RX ORDER — DEXTROSE MONOHYDRATE 100 MG/ML
INJECTION, SOLUTION INTRAVENOUS CONTINUOUS PRN
Status: DISCONTINUED | OUTPATIENT
Start: 2022-08-17 | End: 2022-08-18 | Stop reason: HOSPADM

## 2022-08-17 RX ORDER — MELOXICAM 7.5 MG/1
7.5 TABLET ORAL DAILY
Status: DISCONTINUED | OUTPATIENT
Start: 2022-08-18 | End: 2022-08-18 | Stop reason: HOSPADM

## 2022-08-17 RX ORDER — SODIUM CHLORIDE 0.9 % (FLUSH) 0.9 %
5-40 SYRINGE (ML) INJECTION EVERY 12 HOURS SCHEDULED
Status: DISCONTINUED | OUTPATIENT
Start: 2022-08-17 | End: 2022-08-17 | Stop reason: HOSPADM

## 2022-08-17 RX ORDER — OXYCODONE HYDROCHLORIDE 10 MG/1
10 TABLET ORAL EVERY 4 HOURS PRN
Status: DISCONTINUED | OUTPATIENT
Start: 2022-08-17 | End: 2022-08-18 | Stop reason: HOSPADM

## 2022-08-17 RX ORDER — INSULIN GLARGINE 100 [IU]/ML
0.25 INJECTION, SOLUTION SUBCUTANEOUS NIGHTLY
Status: DISCONTINUED | OUTPATIENT
Start: 2022-08-17 | End: 2022-08-18

## 2022-08-17 RX ORDER — TRANEXAMIC ACID 650 1/1
1950 TABLET ORAL ONCE
Status: COMPLETED | OUTPATIENT
Start: 2022-08-17 | End: 2022-08-17

## 2022-08-17 RX ORDER — OXYCODONE HYDROCHLORIDE 10 MG/1
10 TABLET ORAL PRN
Status: DISCONTINUED | OUTPATIENT
Start: 2022-08-17 | End: 2022-08-17 | Stop reason: HOSPADM

## 2022-08-17 RX ORDER — CALCIUM CARBONATE 200(500)MG
500 TABLET,CHEWABLE ORAL 3 TIMES DAILY PRN
Status: DISCONTINUED | OUTPATIENT
Start: 2022-08-17 | End: 2022-08-18 | Stop reason: HOSPADM

## 2022-08-17 RX ORDER — INSULIN LISPRO 100 [IU]/ML
0.08 INJECTION, SOLUTION INTRAVENOUS; SUBCUTANEOUS
Status: DISCONTINUED | OUTPATIENT
Start: 2022-08-17 | End: 2022-08-18

## 2022-08-17 RX ORDER — SODIUM CHLORIDE 450 MG/100ML
INJECTION, SOLUTION INTRAVENOUS CONTINUOUS
Status: DISCONTINUED | OUTPATIENT
Start: 2022-08-17 | End: 2022-08-18 | Stop reason: HOSPADM

## 2022-08-17 RX ORDER — PREGABALIN 75 MG/1
75 CAPSULE ORAL 2 TIMES DAILY
Qty: 28 CAPSULE | Refills: 0 | Status: SHIPPED | OUTPATIENT
Start: 2022-08-17 | End: 2022-09-27

## 2022-08-17 RX ORDER — INSULIN LISPRO 100 [IU]/ML
0-4 INJECTION, SOLUTION INTRAVENOUS; SUBCUTANEOUS NIGHTLY
Status: DISCONTINUED | OUTPATIENT
Start: 2022-08-17 | End: 2022-08-18

## 2022-08-17 RX ORDER — IBUPROFEN 600 MG/1
600 TABLET ORAL EVERY 6 HOURS PRN
Qty: 120 TABLET | Refills: 3
Start: 2022-08-17 | End: 2022-09-12

## 2022-08-17 RX ORDER — PHENYLEPHRINE HCL IN 0.9% NACL 1 MG/10 ML
SYRINGE (ML) INTRAVENOUS PRN
Status: DISCONTINUED | OUTPATIENT
Start: 2022-08-17 | End: 2022-08-17 | Stop reason: SDUPTHER

## 2022-08-17 RX ORDER — OXYCODONE HYDROCHLORIDE 5 MG/1
5 TABLET ORAL PRN
Status: DISCONTINUED | OUTPATIENT
Start: 2022-08-17 | End: 2022-08-17 | Stop reason: HOSPADM

## 2022-08-17 RX ORDER — ROSUVASTATIN CALCIUM 20 MG/1
20 TABLET, COATED ORAL DAILY
Status: DISCONTINUED | OUTPATIENT
Start: 2022-08-18 | End: 2022-08-18 | Stop reason: HOSPADM

## 2022-08-17 RX ORDER — BUPIVACAINE HYDROCHLORIDE 5 MG/ML
INJECTION, SOLUTION EPIDURAL; INTRACAUDAL
Status: DISCONTINUED | OUTPATIENT
Start: 2022-08-17 | End: 2022-08-17 | Stop reason: SDUPTHER

## 2022-08-17 RX ORDER — LISINOPRIL AND HYDROCHLOROTHIAZIDE 20; 12.5 MG/1; MG/1
1 TABLET ORAL DAILY
Status: DISCONTINUED | OUTPATIENT
Start: 2022-08-18 | End: 2022-08-18 | Stop reason: HOSPADM

## 2022-08-17 RX ORDER — PREGABALIN 75 MG/1
75 CAPSULE ORAL 2 TIMES DAILY
Status: DISCONTINUED | OUTPATIENT
Start: 2022-08-17 | End: 2022-08-18 | Stop reason: HOSPADM

## 2022-08-17 RX ORDER — ASPIRIN 81 MG/1
81 TABLET ORAL 2 TIMES DAILY
Qty: 28 TABLET | Refills: 0 | Status: SHIPPED | OUTPATIENT
Start: 2022-08-17 | End: 2022-09-30

## 2022-08-17 RX ORDER — PROCHLORPERAZINE EDISYLATE 5 MG/ML
5 INJECTION INTRAMUSCULAR; INTRAVENOUS ONCE
Status: COMPLETED | OUTPATIENT
Start: 2022-08-17 | End: 2022-08-17

## 2022-08-17 RX ORDER — ROCURONIUM BROMIDE 10 MG/ML
INJECTION, SOLUTION INTRAVENOUS PRN
Status: DISCONTINUED | OUTPATIENT
Start: 2022-08-17 | End: 2022-08-17 | Stop reason: SDUPTHER

## 2022-08-17 RX ORDER — FENTANYL CITRATE 50 UG/ML
25 INJECTION, SOLUTION INTRAMUSCULAR; INTRAVENOUS EVERY 5 MIN PRN
Status: COMPLETED | OUTPATIENT
Start: 2022-08-17 | End: 2022-08-17

## 2022-08-17 RX ORDER — MORPHINE SULFATE 4 MG/ML
4 INJECTION, SOLUTION INTRAMUSCULAR; INTRAVENOUS
Status: DISCONTINUED | OUTPATIENT
Start: 2022-08-17 | End: 2022-08-18 | Stop reason: HOSPADM

## 2022-08-17 RX ADMIN — MELOXICAM 7.5 MG: 7.5 TABLET ORAL at 08:01

## 2022-08-17 RX ADMIN — HYDROMORPHONE HYDROCHLORIDE 0.5 MG: 1 INJECTION, SOLUTION INTRAMUSCULAR; INTRAVENOUS; SUBCUTANEOUS at 10:36

## 2022-08-17 RX ADMIN — ACETAMINOPHEN 650 MG: 325 TABLET, FILM COATED ORAL at 17:42

## 2022-08-17 RX ADMIN — DROPERIDOL 0.62 MG: 2.5 INJECTION, SOLUTION INTRAMUSCULAR; INTRAVENOUS at 11:47

## 2022-08-17 RX ADMIN — OXYCODONE HYDROCHLORIDE 10 MG: 10 TABLET ORAL at 08:01

## 2022-08-17 RX ADMIN — SODIUM CHLORIDE: 9 INJECTION, SOLUTION INTRAVENOUS at 10:31

## 2022-08-17 RX ADMIN — BUPIVACAINE HYDROCHLORIDE 20 ML: 5 INJECTION, SOLUTION EPIDURAL; INTRACAUDAL; PERINEURAL at 08:30

## 2022-08-17 RX ADMIN — ROCURONIUM BROMIDE 40 MG: 10 INJECTION, SOLUTION INTRAVENOUS at 09:31

## 2022-08-17 RX ADMIN — PROCHLORPERAZINE EDISYLATE 5 MG: 5 INJECTION INTRAMUSCULAR; INTRAVENOUS at 14:18

## 2022-08-17 RX ADMIN — Medication 120 MG: at 09:23

## 2022-08-17 RX ADMIN — SODIUM CHLORIDE: 4.5 INJECTION, SOLUTION INTRAVENOUS at 14:21

## 2022-08-17 RX ADMIN — CEFAZOLIN 2000 MG: 2 INJECTION, POWDER, FOR SOLUTION INTRAMUSCULAR; INTRAVENOUS at 09:31

## 2022-08-17 RX ADMIN — PROPOFOL 100 MG: 10 INJECTION, EMULSION INTRAVENOUS at 09:23

## 2022-08-17 RX ADMIN — TRANEXAMIC ACID 1950 MG: 650 TABLET ORAL at 08:01

## 2022-08-17 RX ADMIN — FENTANYL CITRATE 25 MCG: 50 INJECTION, SOLUTION INTRAMUSCULAR; INTRAVENOUS at 11:09

## 2022-08-17 RX ADMIN — ONDANSETRON 4 MG: 2 INJECTION INTRAMUSCULAR; INTRAVENOUS at 10:24

## 2022-08-17 RX ADMIN — ONDANSETRON 4 MG: 2 INJECTION INTRAMUSCULAR; INTRAVENOUS at 11:26

## 2022-08-17 RX ADMIN — LIDOCAINE HYDROCHLORIDE 80 MG: 20 INJECTION, SOLUTION EPIDURAL; INFILTRATION; INTRACAUDAL; PERINEURAL at 09:23

## 2022-08-17 RX ADMIN — BUPIVACAINE HYDROCHLORIDE 20 ML: 2.5 INJECTION, SOLUTION INFILTRATION; PERINEURAL at 08:30

## 2022-08-17 RX ADMIN — BUPIVACAINE HYDROCHLORIDE 25 ML: 2.5 INJECTION, SOLUTION INFILTRATION; PERINEURAL at 08:35

## 2022-08-17 RX ADMIN — Medication 200 MCG: at 09:36

## 2022-08-17 RX ADMIN — FENTANYL CITRATE 25 MCG: 50 INJECTION, SOLUTION INTRAMUSCULAR; INTRAVENOUS at 11:04

## 2022-08-17 RX ADMIN — SUGAMMADEX 200 MG: 100 INJECTION, SOLUTION INTRAVENOUS at 10:30

## 2022-08-17 RX ADMIN — FENTANYL CITRATE 25 MCG: 50 INJECTION, SOLUTION INTRAMUSCULAR; INTRAVENOUS at 11:20

## 2022-08-17 RX ADMIN — CEFAZOLIN 2000 MG: 2 INJECTION, POWDER, FOR SOLUTION INTRAMUSCULAR; INTRAVENOUS at 17:47

## 2022-08-17 RX ADMIN — FENTANYL CITRATE 25 MCG: 50 INJECTION, SOLUTION INTRAMUSCULAR; INTRAVENOUS at 11:12

## 2022-08-17 RX ADMIN — SODIUM CHLORIDE: 9 INJECTION, SOLUTION INTRAVENOUS at 08:12

## 2022-08-17 RX ADMIN — HYDROMORPHONE HYDROCHLORIDE 0.5 MG: 1 INJECTION, SOLUTION INTRAMUSCULAR; INTRAVENOUS; SUBCUTANEOUS at 10:42

## 2022-08-17 RX ADMIN — DEXAMETHASONE SODIUM PHOSPHATE 8 MG: 4 INJECTION, SOLUTION INTRAMUSCULAR; INTRAVENOUS at 09:31

## 2022-08-17 RX ADMIN — Medication 100 MCG: at 09:37

## 2022-08-17 RX ADMIN — FENTANYL CITRATE 100 MCG: 50 INJECTION INTRAMUSCULAR; INTRAVENOUS at 08:30

## 2022-08-17 ASSESSMENT — PAIN SCALES - GENERAL
PAINLEVEL_OUTOF10: 0
PAINLEVEL_OUTOF10: 10
PAINLEVEL_OUTOF10: 10
PAINLEVEL_OUTOF10: 0
PAINLEVEL_OUTOF10: 3

## 2022-08-17 ASSESSMENT — PAIN DESCRIPTION - PAIN TYPE: TYPE: SURGICAL PAIN

## 2022-08-17 ASSESSMENT — PAIN DESCRIPTION - LOCATION
LOCATION: KNEE

## 2022-08-17 ASSESSMENT — PAIN DESCRIPTION - ORIENTATION
ORIENTATION: RIGHT
ORIENTATION: RIGHT

## 2022-08-17 ASSESSMENT — PAIN DESCRIPTION - DESCRIPTORS: DESCRIPTORS: DISCOMFORT

## 2022-08-17 ASSESSMENT — PAIN - FUNCTIONAL ASSESSMENT
PAIN_FUNCTIONAL_ASSESSMENT: 0-10
PAIN_FUNCTIONAL_ASSESSMENT: ACTIVITIES ARE NOT PREVENTED

## 2022-08-17 ASSESSMENT — LIFESTYLE VARIABLES: SMOKING_STATUS: 0

## 2022-08-17 ASSESSMENT — PAIN DESCRIPTION - ONSET: ONSET: ON-GOING

## 2022-08-17 ASSESSMENT — PAIN DESCRIPTION - FREQUENCY: FREQUENCY: CONTINUOUS

## 2022-08-17 NOTE — PLAN OF CARE
Problem: Discharge Planning  Goal: Discharge to home or other facility with appropriate resources  Outcome: Progressing  Flowsheets (Taken 8/17/2022 1646)  Discharge to home or other facility with appropriate resources:   Identify barriers to discharge with patient and caregiver   Arrange for needed discharge resources and transportation as appropriate   Identify discharge learning needs (meds, wound care, etc)     Problem: Chronic Conditions and Co-morbidities  Goal: Patient's chronic conditions and co-morbidity symptoms are monitored and maintained or improved  Outcome: Progressing  Flowsheets (Taken 8/17/2022 1646)  Care Plan - Patient's Chronic Conditions and Co-Morbidity Symptoms are Monitored and Maintained or Improved:   Monitor and assess patient's chronic conditions and comorbid symptoms for stability, deterioration, or improvement   Collaborate with multidisciplinary team to address chronic and comorbid conditions and prevent exacerbation or deterioration     Problem: Cardiovascular - Adult  Goal: Maintains optimal cardiac output and hemodynamic stability  Outcome: Progressing  Flowsheets (Taken 8/17/2022 1646)  Maintains optimal cardiac output and hemodynamic stability:   Monitor blood pressure and heart rate   Monitor urine output and notify Licensed Independent Practitioner for values outside of normal range   Assess for signs of decreased cardiac output   Administer fluid and/or volume expanders as ordered     Problem: Skin/Tissue Integrity - Adult  Goal: Incisions, wounds, or drain sites healing without S/S of infection  Outcome: Progressing  Flowsheets (Taken 8/17/2022 1646)  Incisions, Wounds, or Drain Sites Healing Without Sign and Symptoms of Infection:   ADMISSION and DAILY: Assess and document risk factors for pressure ulcer development   TWICE DAILY: Assess and document skin integrity   TWICE DAILY: Assess and document dressing/incision, wound bed, drain sites and surrounding tissue Problem: Musculoskeletal - Adult  Goal: Return mobility to safest level of function  Outcome: Progressing  Flowsheets (Taken 8/17/2022 1646)  Return Mobility to Safest Level of Function:   Assess patient stability and activity tolerance for standing, transferring and ambulating with or without assistive devices   Assist with transfers and ambulation using safe patient handling equipment as needed   Ensure adequate protection for wounds/incisions during mobilization   Obtain physical therapy/occupational therapy consults as needed     Problem: Infection - Adult  Goal: Absence of infection at discharge  Outcome: Progressing  Flowsheets (Taken 8/17/2022 1646)  Absence of infection at discharge:   Assess and monitor for signs and symptoms of infection   Monitor lab/diagnostic results   Monitor all insertion sites i.e., indwelling lines, tubes and drains   Administer medications as ordered     Problem: Metabolic/Fluid and Electrolytes - Adult  Goal: Glucose maintained within prescribed range  Outcome: Progressing  Flowsheets (Taken 8/17/2022 1646)  Glucose maintained within prescribed range:   Monitor blood glucose as ordered   Assess for signs and symptoms of hyperglycemia and hypoglycemia   Administer ordered medications to maintain glucose within target range   Assess barriers to adequate nutritional intake and initiate nutrition consult as needed     Problem: Pain  Goal: Verbalizes/displays adequate comfort level or baseline comfort level  Outcome: Progressing  Flowsheets (Taken 8/17/2022 1646)  Verbalizes/displays adequate comfort level or baseline comfort level:   Encourage patient to monitor pain and request assistance   Assess pain using appropriate pain scale   Administer analgesics based on type and severity of pain and evaluate response   Implement non-pharmacological measures as appropriate and evaluate response     Problem: Safety - Adult  Goal: Free from fall injury  Outcome: Progressing  Flowsheets (Taken 8/17/2022 1646)  Free From Fall Injury: Instruct family/caregiver on patient safety     Problem: ABCDS Injury Assessment  Goal: Absence of physical injury  Outcome: Progressing  Flowsheets (Taken 8/17/2022 1646)  Absence of Physical Injury: Implement safety measures based on patient assessment     Problem: Skin/Tissue Integrity  Goal: Absence of new skin breakdown  Description: 1. Monitor for areas of redness and/or skin breakdown  2. Assess vascular access sites hourly  3. Every 4-6 hours minimum:  Change oxygen saturation probe site  4. Every 4-6 hours:  If on nasal continuous positive airway pressure, respiratory therapy assess nares and determine need for appliance change or resting period.   Outcome: Progressing

## 2022-08-17 NOTE — OP NOTE
Patient: Hanane Mcduffie  YOB: 1950  MRN: [id number]    DATE OF PROCEDURE: 8/17/2022      PREOPERATIVE DIAGNOSIS:  Tricompartmental degenerative osteoarthritis of the right knee. POSTOPERATIVE DIAGNOSIS:  Tricompartmental degenerative osteoarthritis of the right knee. OPERATION PERFORMED:  Robotic-assisted right total knee arthroplasty. SURGEON:  Mis Haynes MD     ASSISTANT:  BALTAZAR Crum    EBL: 100 mL     IMPLANTS:  [place] [employer] CR cementless femur size 5  [place] [employer] cementless tibia size 5  9mm CS polyethylene  32mm cementless asymmetric patella     INDICATIONS:  The patient is a 66 y.o. female who presents for right knee replacement. The patient had been treated conservatively with anti-inflammatories, physical therapy, and intra-articular cortisone injections; these treatments were no longer providing significant relief. We discussed total knee arthroplasty. The operative procedure, alternatives, and risks were discussed in detail with the patient. The risks include but are not limited to: Infection, vessel injury, nerve injury, DVT, pulmonary embolism, implant loosening, need for revision surgery, loss of motion, continued pain. Informed consent for surgery was signed by the patient. OPERATIVE PROCEDURE:  The patient was seen in the preoperative holding area where the site of surgery was marked and informed consent was confirmed. The patient was brought back to the operating room by OR personnel. Anesthesia was administered. The patient was positioned supine on the operating table. The right lower extremity was then prepped and draped in a standard and sterile fashion. A final and formal timeout was then performed which confirmed the correct patient, correct position, and correct site of surgery. IV antibiotics were administered within 1 hour of the skin incision. An anterior midline incision was made over the knee.  Skin and subcutaneous tissue were closure.     Samir Grimes MD  8/17/2022

## 2022-08-17 NOTE — DISCHARGE INSTRUCTIONS
If you use a cpap for sleep apnea, please wear when sleeping while on narcotics. Do not drink alcohol while taking your blood thinner or narcotic pain medication. Do not take any sleep aids while on narcotics. Wear el hoses (compression stockings) for 2 weeks and to only remove when showering or at bedtime. Please wear Teds to follow up appointment with orthopedic surgeon. Use your Incentive Spirometer 4 times a day for 1 week after surgery to prevent pneumonia. Use ice packs as needed for swelling and pain. DO NOT apply directly to your skin. Use a clean towel or pillow case as a barrier between your skin and the ice pack. Pain pills and activity changes may lead to constipation. You may need to use a laxative such as Dulcolax, Senokot, Miralax, or Milk of Magnesia. If you do not have a bowel movement daily then we recommend to take a laxative that same evening. When to clean your hands: For patients  In the hospital or in your home, you can come in contact with many harmful germs. To help prevent infection, wash your hands with soap and water often, especially:  Before and After touching or changing a dressing or bandage  After using the bathroom  Before and after eating  After coughing or sneezing  After using a tissue  After touching any object or surface that may be contaminated  After touching an animal during a pet therapy session (hospital)  After touching an animal, cleaning up after a pet, or preparing food for pets (home)    Please contact Melody Muñiz or the Orthopedic office with any questions or concerns after your discharge. If you have any issues or concerns after hours please call the Orthopedic Office to reach the Surgeon on call first at  [phone]. If you need a pain medication refill please contact your surgeon's office.      [facility] Orth[facility]:    Monday-Friday 8am- 5pm      [address] [facility] Nurse Navigator: Silke Pate 8am- 5pm, Thursday 8am-3pm  [phone]    After Hours Clinic Walk in [facility]  [address]   Anika Bernardo, [address]- [address]    Monday-Friday 5pm-9pm  &  Saturday 9am-1pm          [phone]      If you have a medical emergency please call 911 or seek immediate medical help. [address]   [phone]    Call 911 anytime you think you may need emergency care. For example, call if:  You passed out (lost consciousness). You have severe trouble breathing. You have sudden chest pain and shortness of breath, or you cough up blood. Call your doctor now or seek immediate medical care if:    Your leg or foot turns cold or changes color. You have symptoms of a blood clot in your leg (called a deep vein thrombosis), such as:  Pain in your calf, back of the knee, thigh, or groin. Redness and swelling in your leg or groin. You have symptoms of infection, such as: Increased pain, swelling, warmth, or redness. Red streaks leading from the wound. Pus draining from the wound. A fever. easy bruising, unusual bleeding (nose, mouth, vagina, or rectum), bleeding from wounds or needle injections, any bleeding that will not stop;  heavy menstrual periods;  urine that looks red, pink, or brown; or  black or bloody stools, coughing up blood or vomit that looks like coffee grounds.

## 2022-08-17 NOTE — PROGRESS NOTES
Physical Therapy  Facility/Department: [address] ORTHOPEDICS  Physical Therapy Initial Assessment    Name: Vern Deleon  : 1950  MRN: [id number]  Date of Service: 2022    Assessment / Discharge Recommendations:  -right TKR    -anticipate discharge to home tomorrow with family assist and Home PT to start the next day  -will see in AM to assess readiness for home   -will need rolling walker for home use      Patient Diagnosis(es): The primary encounter diagnosis was Arthritis of right knee. A diagnosis of Mixed hyperlipidemia was also pertinent to this visit. Past Medical History:  has a past medical history of Arthritis, Colon cancer ([facility] [address] Colon polyp, Essential hypertension, Full dentures, HTN (hypertension), Hyperlipidemia, and Mixed hyperlipidemia. Past Surgical History:  has a past surgical history that includes Colonoscopy; Appendectomy; and colectomy (). Body Structures, Functions, Activity Limitations Requiring Skilled Therapeutic Intervention: Decreased ADL status; Decreased ROM; Increased pain;Decreased balance  Therapy Prognosis: Good  Decision Making: Medium Complexity  Requires PT Follow-Up: Yes  Activity Tolerance  Activity Tolerance: Patient limited by fatigue     Plan   Plan  Current Treatment Recommendations: Transfer training, ADL/Self-care training, ROM, Gait training, Stair training, Positioning, Modalities, Patient/Caregiver education & training, Therapeutic activities  Plan Comment: 1-4 sessions  Safety Devices  Type of Devices: Call light within reach, Chair alarm in place, Gait belt, Patient at risk for falls, Nurse notified, Left in chair     Restrictions  Restrictions/Precautions  Restrictions/Precautions: Weight Bearing, Fall Risk  Lower Extremity Weight Bearing Restrictions  Right Lower Extremity Weight Bearing: Weight Bearing As Tolerated     Subjective   General  Chart Reviewed: Yes  Patient assessed for rehabilitation services?: Yes  Additional Pertinent Hx: here for elective right TKR  Response To Previous Treatment: Not applicable  Family / Caregiver Present: No  Follows Commands: Within Functional Limits  Subjective  Subjective: arrived to room in tandem of OT to patient awake but still drowsy - states nausea has eased - she is agreeable to PT OT and to mobilize from bed - states pain at rest is minimal         Social/Functional History  Social/Functional History  Lives With: Spouse  Type of Home: House  Home Layout: Multi-level, Laundry in basement, 1/2 bath on main level, Bed/Bath upstairs, Able to Live on Main level with bedroom/bathroom (Plans to stay on first level, sleeps in recliner chair and has 1/2 bath in family room)  Home Access: Stairs to enter with rails  Bathroom Shower/Tub: Tub/Shower unit  Bathroom Toilet: Standard (standard commode 1/2 bath, full bath comfort height)  Bathroom Accessibility: Marianna Ra accessible  Has the patient had two or more falls in the past year or any fall with injury in the past year?: No  ADL Assistance: Independent  Homemaking Assistance: Independent  Ambulation Assistance: Independent  Transfer Assistance: Independent  Active : Yes  Additional Comments: Here for elective Right TKR: Reports Left knee\"okay\", spinal stenosis. Vision/Hearing  Vision  Vision: Within Functional Limits  Hearing  Hearing: Within functional limits    Cognition   Orientation  Overall Orientation Status: Within Functional Limits  Cognition  Overall Cognitive Status: WFL     Objective   Observation/Palpation  Observation: R knee ace wrap toes to mid thigh  Gross Assessment  Tone: Normal  Sensation: Intact   Strength Other  Other: grossly functional in non-surgical limbs  Balance  Sitting:  (CGA at EOB and on toilet)  Standing:  (within stedy with BUE on bar for pericare and pant mangement- CGA)  Gait  Overall Level of Assistance:  Total assistance (EOB>toilet>recliner with rony stedy lift equipment d/t drowsiness)  Bed mobility  Supine to Sit: Contact guard assistance;Minimal assistance  Scooting: Minimal assistance  Transfers  Sit to Stand: Contact guard assistance;Minimal Assistance (in rony bailey)  Stand to sit: Contact guard assistance;Minimal Assistance  Ambulation  Comments: too drowsy to attempt up to walker     Balance  Comments: midline in sitting and static stance in the stedy  -aarom <10 to ~70 degrees  Goals  Short Term Goals  Time Frame for Short term goals: 1-2 days  Short term goal 1: bed mobility at [address] [place] [address] term goal 2: transfers at supervision/cga  Short term goal 3: ambulation at supervision/cga wbat rolling walker for household distances    -steps as needed for home entry at [address] [place] [address] term goal 4: exercises at supervision  Patient Goals   Patient goals : relief of chronic right knee pain and good function       Education  Patient Education  Education Given To: Patient  Education Provided: Role of Therapy;Plan of Care;Precautions  Education Method: Verbal  Barriers to Learning: None  Education Outcome: Continued education needed      Therapy Time   Individual Concurrent Group Co-treatment   Time In 1450         Time Out 1530         Minutes [address], PT

## 2022-08-17 NOTE — PROGRESS NOTES
Patient up in chair. Sleepy but awakens easily. Denies pain but agreeable to taking Tylenol. Declined dinner. Nausea controlled right now. Remains on 2L due to drowsiness. IV abx infusing. Chair alarm on. Call light and belongings within reach. Will continue to monitor.      Electronically signed by Rosana Nick RN on 8/17/2022 at 5:51 PM

## 2022-08-17 NOTE — PROGRESS NOTES
[facility] Orthopedic Surgery   Progress Note      S/P :  SUBJECTIVE  In bed. Drowsy but oriented. . Pain is   described in right knee and with the intensity of moderate. Pain is described as aching. OBJECTIVE              Physical                      VITALS:  /81   Pulse 64   Temp 97.8 °F (36.6 °C) (Temporal)   Resp 16   Ht 5' 6.5\" (1.689 m)   Wt 197 lb (89.4 kg)   SpO2 92%   BMI 31.32 kg/m²                     MUSCULOSKELETAL:  right foot NVI. Wiggles toes to command. Able to plantarflex and dorsiflex ankle Pedal pulses are palpable. NEUROLOGIC:                                  Sensory:  Touch:  Right Lower Extremity:  normal                                                 Surgical wound appears clean and dry right knee with ACE and ice pad.      Data       CBC:   Lab Results   Component Value Date/Time    WBC 4.1 08/08/2022 11:55 AM    RBC 4.13 08/08/2022 11:55 AM    HGB 13.6 08/08/2022 11:55 AM    HCT 39.6 08/08/2022 11:55 AM    MCV 96.0 08/08/2022 11:55 AM    MCH 33.0 08/08/2022 11:55 AM    MCHC 34.3 08/08/2022 11:55 AM    RDW 12.6 08/08/2022 11:55 AM     08/08/2022 11:55 AM    MPV 7.6 08/08/2022 11:55 AM        WBC:    Lab Results   Component Value Date/Time    WBC 4.1 08/08/2022 11:55 AM        Hemoglobin/Hematocrit:    Lab Results   Component Value Date/Time    HGB 13.6 08/08/2022 11:55 AM    HCT 39.6 08/08/2022 11:55 AM        PT/INR:    Lab Results   Component Value Date/Time    PROTIME 13.5 08/08/2022 11:55 AM    INR 1.04 08/08/2022 11:55 AM              Current Inpatient Medications             Current Facility-Administered Medications: scopolamine (TRANSDERM-SCOP) transdermal patch 1 patch, 1 patch, TransDERmal, Once  [START ON 8/18/2022] lisinopril-hydroCHLOROthiazide (PRINZIDE;ZESTORETIC) 20-12.5 MG per tablet 1 tablet, 1 tablet, Oral, Daily  [START ON 8/18/2022] rosuvastatin (CRESTOR) tablet 20 mg, 20 mg, Oral, Daily  insulin lispro (HUMALOG) injection vial 7 Units, 0.08 Units/kg, SubCUTAneous, TID WC  glucose chewable tablet 16 g, 4 tablet, Oral, PRN  dextrose bolus 10% 125 mL, 125 mL, IntraVENous, PRN **OR** dextrose bolus 10% 250 mL, 250 mL, IntraVENous, PRN  glucagon (rDNA) injection 1 mg, 1 mg, SubCUTAneous, PRN  dextrose 10 % infusion, , IntraVENous, Continuous PRN  0.45 % sodium chloride infusion, , IntraVENous, Continuous  sodium chloride flush 0.9 % injection 5-40 mL, 5-40 mL, IntraVENous, 2 times per day  sodium chloride flush 0.9 % injection 5-40 mL, 5-40 mL, IntraVENous, PRN  0.9 % sodium chloride infusion, , IntraVENous, PRN  acetaminophen (TYLENOL) tablet 650 mg, 650 mg, Oral, Q6H  ketorolac (TORADOL) injection 15 mg, 15 mg, IntraVENous, Once PRN  oxyCODONE (ROXICODONE) immediate release tablet 5 mg, 5 mg, Oral, Q4H PRN **OR** oxyCODONE HCl (OXY-IR) immediate release tablet 10 mg, 10 mg, Oral, Q4H PRN  morphine (PF) injection 2 mg, 2 mg, IntraVENous, Q3H PRN **OR** morphine (PF) injection 4 mg, 4 mg, IntraVENous, Q3H PRN  ceFAZolin (ANCEF) 2,000 mg in dextrose 5 % 50 mL IVPB (mini-bag), 2,000 mg, IntraVENous, Q8H  ondansetron (ZOFRAN-ODT) disintegrating tablet 4 mg, 4 mg, Oral, Q8H PRN **OR** ondansetron (ZOFRAN) injection 4 mg, 4 mg, IntraVENous, Q6H PRN  aspirin EC tablet 81 mg, 81 mg, Oral, BID  insulin lispro (HUMALOG) injection vial 0-4 Units, 0-4 Units, SubCUTAneous, TID WC  insulin lispro (HUMALOG) injection vial 0-4 Units, 0-4 Units, SubCUTAneous, Nightly  calcium carbonate (TUMS) chewable tablet 500 mg, 500 mg, Oral, TID PRN  pregabalin (LYRICA) capsule 75 mg, 75 mg, Oral, BID  [START ON 8/18/2022] meloxicam (MOBIC) tablet 7.5 mg, 7.5 mg, Oral, Daily  prochlorperazine (COMPAZINE) injection 5 mg, 5 mg, IntraVENous, Once  insulin glargine (LANTUS) injection vial 22 Units, 0.25 Units/kg, SubCUTAneous, Nightly    ASSESSMENT AND PLAN    Post right TKA, stable exam  DVT prophylaxis ordered, ASA 81mg twice at day for 14 days for DVT prophylaxis   PT OT for ADL's and ambulation as tolerated  SS for DC planning, home with home care tomorrow  IV or PO pain med as ordered , Lyrica and Mobic added for postop pain control in an effort to reduce narcotic use per Dr Raul Barger protocol.         Ellie Quinn, FRANKLIN - CNP  8/17/2022  2:06 PM

## 2022-08-17 NOTE — H&P
Update History & Physical    The patient's History and Physical of August 8, 2022 was reviewed with the patient and I examined the patient. There was no change. The surgical site was confirmed by the patient and me. Plan: The risks, benefits, expected outcome, and alternative to the recommended procedure have been discussed with the patient. Patient understands and wants to proceed with the procedure.      Electronically signed by Lesli Quintero MD on 8/17/2022 at 8:46 AM

## 2022-08-17 NOTE — ANESTHESIA PROCEDURE NOTES
Peripheral Block    Patient location during procedure: pre-op  Reason for block: post-op pain management and at surgeon's request  Start time: 8/17/2022 8:35 AM  End time: 8/17/2022 8:40 AM  Staffing  Performed: anesthesiologist   Anesthesiologist: Bella Cockayne, MD  Preanesthetic Checklist  Completed: patient identified, IV checked, site marked, risks and benefits discussed, surgical/procedural consents, equipment checked, pre-op evaluation, timeout performed, anesthesia consent given, oxygen available, monitors applied/VS acknowledged, fire risk safety assessment completed and verbalized and blood product R/B/A discussed and consented  Peripheral Block   Patient position: supine  Prep: ChloraPrep  Provider prep: mask and sterile gloves  Patient monitoring: continuous pulse ox, frequent blood pressure checks, IV access, oxygen and responsive to questions  Block type: iPacks  Laterality: right  Injection technique: single-shot  Guidance: ultrasound guided  Local infiltration: lidocaine  Infiltration strength: 1 %  Local infiltration: lidocaine  Dose: 3 mL    Needle   Needle type: insulated echogenic nerve stimulator needle   Needle gauge: 21 G  Needle localization: ultrasound guidance  Assessment   Injection assessment: negative aspiration for heme, no paresthesia on injection, local visualized surrounding nerve on ultrasound and no intravascular symptoms  Paresthesia pain: none  Slow fractionated injection: yes  Hemodynamics: stableno  Outcomes: uncomplicated    Additional Notes  The patient was placed in a supine position. The thigh and lower leg were placed into an external rotation. The skin was prepared with a chlorhexidine solution. A time out was performed to identify the correct patient, procedure, laterality and allergies. An ultrasound probe was used for guidance. The medial aspect of the distal thigh was scanned until the femur, popliteal artery and vein was identified on ultrasound.  An echogenic needle was used to advance between the femur and past the popliteal artery. Local anesthetic solution was injected in 5 cc aliquots after negative aspiration was confirmed. The needle was further slowly withdrawn with further 5 cc aliquots of local anesthetic and again negative aspiration was confirmed with each administration. The patient tolerated the procedure well and in good condition.      Medications Administered  bupivacaine 0.25 % - Perineural   25 mL - 8/17/2022 8:35:00 AM

## 2022-08-17 NOTE — ANESTHESIA POSTPROCEDURE EVALUATION
Department of Anesthesiology  Postprocedure Note    Patient: Huan Arora  MRN: [id number]  YOB: 1950  Date of evaluation: 8/17/2022      Procedure Summary     Date: 08/17/22 Room / Location: [address]    Anesthesia Start: Junella Flakes Anesthesia Stop: 1340    Procedure: RIGHT TOTAL KNEE REPLACEMENT ROBOTIC ASSISTED (Right) Diagnosis:       Arthritis of right knee      (RIGHT ARTHRITIS KNEE)    Surgeons: German Galdamez MD Responsible Provider: Darrion Her MD    Anesthesia Type: general, regional ASA Status: 3          Anesthesia Type: No value filed.     Анна Phase I: Анна Score: 10    Анна Phase II:        Anesthesia Post Evaluation    Patient location during evaluation: PACU  Patient participation: complete - patient participated  Level of consciousness: awake  Airway patency: patent  Nausea & Vomiting: no nausea and no vomiting  Cardiovascular status: blood pressure returned to baseline  Respiratory status: acceptable  Hydration status: stable  Multimodal analgesia pain management approach

## 2022-08-17 NOTE — PROGRESS NOTES
Met with patient at bedside, patient is in bed asleep but arouses to voice. discussed role of nurse navigator. Reviewed reasons to call with questions or concerns, importance of TEDS, Incentive spirometer, pain medication, and physical and occupational therapy. 2/4 bed rails up, bed in lowest position, fall precautions in place, call light within reach. Pulses present bilaterally +2 pedal, no drainage or odor noted at surgical dressing right knee. ace Dressing clean, dry, and intact. Ice in place. Joseph and scds on LLE. Neurovascular checks performed and weak dorsi and plantar flexions noted bilaterally, toes appear appropriate to ethnicity in color, warm to touch, patient denies numbness or tingling. Having some nausea, environmental changes performed and crackers at bedside along with emesis bag, ordered gingerale. [address] bedside RN notified and stated compazine is ordered to be administered. Already has scopolamine patch in place. DC Plan: per jet notes: home with [facility] - will confirm when patient is more alert. Per jet notes:  Rima Greene to transport patient.   DME needs:per jet notes - needs a rolling walker - will confirm when patient is more alert    Aggie Davidson Nurse Navigator  Phone number: [phone]    Future Appointments   Date Time Provider Eder Cuevas   9/1/2022 10:00 AM Mis Haynes MD [facility]   9/12/2022  2:20 PM Rodney Obrien MD [address]     Electronically signed by Nikos Sullivan RN on 8/17/2022 at 2:15 PM

## 2022-08-17 NOTE — CARE COORDINATION
Referral per clinical path. Met with patient and confirmed plans to return to home with  and [facility]. Verified demographics and that the following DME is needed:rolling walker    Patient denies any other needs or concerns. Referred to [facility] and [facility] liaisons who will follow up with patient.    Electronically signed by John Walsh on 8/17/2022 at 6:20 PM

## 2022-08-17 NOTE — PROGRESS NOTES
Occupational Therapy  Facility/Department: [address] ORTHOPEDICS  Occupational Therapy Initial Assessment    Name: Hanane Mcduffie  : 1950  MRN: [id number]  Date of Service: 2022    Discharge Recommendations:  2-3 sessions per week, Patient would benefit from continued therapy after discharge, Home with Home health OT, 24 hour supervision or assist        Hanane Mcduffie scored a 17/24 on the AM-PAC ADL Inpatient form. Based on the patient's AM-PAC score, and their current ADL deficits, it is recommended that the patient have 2-3 sessions per week of Occupational Therapy at d/c to increase the patient's independence. At this time, this patient demonstrates the endurance and safety to discharge home with [address] [place] [address] (home vs OP services) and a follow up treatment frequency of 2-3x/wk. Please see assessment section for further patient specific details. If patient discharges prior to next session this note will serve as a discharge summary. Please see below for the latest assessment towards goals. Patient Diagnosis(es): The primary encounter diagnosis was Arthritis of right knee. A diagnosis of Mixed hyperlipidemia was also pertinent to this visit. Past Medical History:  has a past medical history of Arthritis, Colon cancer ([facility] [address] Colon polyp, Essential hypertension, Full dentures, HTN (hypertension), Hyperlipidemia, and Mixed hyperlipidemia. Past Surgical History:  has a past surgical history that includes Colonoscopy; Appendectomy; and colectomy (). Treatment Diagnosis: impaired ADL/fxl mobility    Assessment   Performance deficits / Impairments: Decreased functional mobility ; Decreased endurance;Decreased ADL status; Decreased safe awareness;Decreased high-level IADLs;Decreased balance  Assessment: 67 yo female admiited for elective R TKA. PMH: HTN, colon CA, lumbar stenosis. PTA, pt lives with spouse and fully IND.  Today, pt functioning below baseline most limited by drowsiness, decreased endurance and R knee pain. Pt completes bed mobility and fxl tx within stedy with Min/CGA. Pt required Mod A toileting and Max A LB dressing. BUE WFL for self care. Cont acute OT to address above deficits. Anticipate as pt less drowsy will increase IND with ADL and able to return home with spouse for 24 hr SUP. Will required OT 2-3x/week to facilitate safe return home  Treatment Diagnosis: impaired ADL/fxl mobility  Prognosis: Good  Decision Making: Medium Complexity  REQUIRES OT FOLLOW-UP: Yes  Activity Tolerance  Activity Tolerance: Patient limited by pain  Activity Tolerance Comments: drowsiness        Plan   Plan  Times per Week: 2-3 sessions  Current Treatment Recommendations: Strengthening, ROM, Balance training, Functional mobility training, Endurance training, Pain management, Safety education & training, Self-Care / ADL, Home management training, Modalities, Positioning     Restrictions  Restrictions/Precautions  Restrictions/Precautions: Weight Bearing, Fall Risk  Lower Extremity Weight Bearing Restrictions  Right Lower Extremity Weight Bearing: Weight Bearing As Tolerated    Subjective   General  Chart Reviewed: Yes  Patient assessed for rehabilitation services?: Yes  Additional Pertinent Hx: 65 yo female admiited for elective R TKA. PMH: HTN, colon CA, lumbar stenosis,  Family / Caregiver Present: No  Referring Practitioner: Lynn Casey MD  Diagnosis: R TKA  Subjective  Subjective: Pt resting in bed upon arrival and agreeable to OT/PT eval. Pt drowsy.  R knee pain with movement/activity moderate, minimal at rest.     Social/Functional History  Social/Functional History  Lives With: Spouse  Type of Home: House  Home Layout: Multi-level, Laundry in basement, 1/2 bath on main level, Bed/Bath upstairs, Able to Live on Main level with bedroom/bathroom (Plans to stay on first level, sleeps in recliner chair and has 1/2 bath in family room)  Home Access: Stairs to enter with rails  Bathroom Shower/Tub: Tub/Shower unit  Bathroom Toilet: Standard (standard commode 1/2 bath, full bath comfort height)  Bathroom Accessibility: [facility] accessible  Has the patient had two or more falls in the past year or any fall with injury in the past year?: No  ADL Assistance: Independent  Homemaking Assistance: Independent  Ambulation Assistance: Independent  Transfer Assistance: Independent  Active : Yes  Additional Comments: Here for elective Right TKR: Reports Left knee\"okay\", spinal stenosis. Objective           Observation/Palpation  Observation: R knee ace wrap toes to mid thigh  Safety Devices  Type of Devices: Call light within reach; Chair alarm in place;Gait belt;Patient at risk for falls;Nurse notified; Left in chair  Balance  Sitting:  (CGA at EOB and on toilet)  Standing:  (within stedy with BUE on bar for pericare and pant mangement- CGA)  Gait  Overall Level of Assistance: Total assistance (EOB>toilet>recliner with rony stedy lift equipment d/t drowsiness)  Toilet Transfers  Toilet - Technique:  (stedy)  Equipment Used: Standard bedside commode (over toilet)  Toilet Transfer: Contact guard assistance;Minimal assistance  AROM: Within functional limits  PROM: Within functional limits  Strength: Within functional limits  Coordination: Within functional limits  Tone: Normal  ADL  UE Dressing Skilled Clinical Factors: doffing/donning gown- assist d/t lines  LE Dressing: Maximum assistance  LE Dressing Skilled Clinical Factors: donning brief within stedy  Toileting: Moderate assistance  Toileting Skilled Clinical Factors: assist pericare and pant management within stedy - mostly d/t drowsiness     Activity Tolerance  Activity Tolerance: Patient limited by fatigue  Bed mobility  Supine to Sit: Contact guard assistance;Minimal assistance  Scooting: Minimal assistance  Bed Mobility Comments: HOB elevated.   Transfers  Sit to stand: Contact guard assistance;Minimal assistance  Stand to sit: Minimal assistance;Contact guard assistance  Transfer Comments: within stedy. cues for safety  Vision  Vision: Within Functional Limits  Hearing  Hearing: Within functional limits  Cognition  Overall Cognitive Status: WFL  Cognition Comment: drowsy  Orientation  Overall Orientation Status: Within Functional Limits                  Education Given To: Patient  Education Provided: Role of Therapy;Plan of Care;Transfer Training  Education Method: Verbal  Barriers to Learning: None  Education Outcome: Verbalized understanding;Continued education needed                      AM-PAC Score        AM-PAC Inpatient Daily Activity Raw Score: 17 (08/17/22 1549)  AM-PAC Inpatient ADL T-Scale Score : 37.26 (08/17/22 1549)  ADL Inpatient CMS 0-100% Score: 50.11 (08/17/22 1549)  ADL Inpatient CMS G-Code Modifier : CK (08/17/22 1549)    Goals  Short Term Goals  Time Frame for Short term goals: prior to d/c  Short Term Goal 1: toileting SUP  Short Term Goal 2: LB dressing SUP  Short Term Goal 3: UB dressing SUP  Short Term Goal 4: tolerate 5 min fxl standing task SUP  Short Term Goal 5: fxl tx and mobility with RW household distances SUP  Patient Goals   Patient goals : return home       Therapy Time   Individual Concurrent Group Co-treatment   Time In 1450         Time Out 1530         Minutes 40         Timed Code Treatment Minutes: 25 Minutes (15 eval. 15 ADL.  10 TA)       ANGIE Sanchez, OTR/L

## 2022-08-17 NOTE — PROGRESS NOTES
PT to PACU, VSS. Anesthesia adminstered 0.5 mg of Hydromorphone on arrival, patient rating pain 10/10.

## 2022-08-17 NOTE — ANESTHESIA PROCEDURE NOTES
Peripheral Block    Patient location during procedure: pre-op  Reason for block: post-op pain management and at surgeon's request  Start time: 8/17/2022 8:30 AM  End time: 8/17/2022 8:35 AM  Staffing  Performed: anesthesiologist   Anesthesiologist: Kosta Rachel MD  Preanesthetic Checklist  Completed: patient identified, IV checked, site marked, risks and benefits discussed, surgical/procedural consents, equipment checked, pre-op evaluation, timeout performed, anesthesia consent given, oxygen available, monitors applied/VS acknowledged, fire risk safety assessment completed and verbalized and blood product R/B/A discussed and consented  Peripheral Block   Patient position: supine  Prep: ChloraPrep  Provider prep: mask and sterile gloves  Patient monitoring: continuous pulse ox, frequent blood pressure checks, IV access, oxygen and responsive to questions  Block type: Femoral  Adductor canal  Laterality: right  Injection technique: single-shot  Guidance: ultrasound guided  Local infiltration: lidocaine  Infiltration strength: 1 %  Local infiltration: lidocaine  Dose: 3 mL    Needle   Needle type: insulated echogenic nerve stimulator needle   Needle gauge: 21 G  Needle localization: ultrasound guidance  Needle length: 10 cm  Assessment   Injection assessment: negative aspiration for heme, local visualized surrounding nerve on ultrasound, no paresthesia on injection and no intravascular symptoms  Paresthesia pain: none  Slow fractionated injection: yes  Hemodynamics: stable  Real-time US image taken/store: yes  Outcomes: uncomplicated    Additional Notes  The patient was placed in a supine position. The patient's thigh and lower leg were uncovered and externally rotated. A chlorohexidine solution was used for preparation of the skin for the nerve block. A time out was performed to identify the patient, procedure, laterality, and allergies. An ultrasound probe was used for guidance.  The ultrasound probe was placed along the proximal thigh to identify the femoral artery, vein, and nerve. The femoral artery was traced along the thigh until the femoral artery was identified to be coursing underneath the sartorius muscle. The adductor yenni and longus muscles were also identified. The probe was traced back and forth proximally and distally until a suitable position for injection was identified. An echogenic needle was used in an in-plane technique to advance towards the saphenous nerve and femoral artery. Local anesthetic was injected after confirmation of negative aspiration of blood. The local anesthetic pushed the femoral artery away from the saphenous nerve and the local anesthetic showed suitable spread underneath the sartorius muscle in the adductor canal. Further local anesthetic was given in 5 cc aliquots and negative aspiration of blood was confirmed after each administration. The patient tolerated the procedure in good condition.     Medications Administered  bupivacaine (PF) 0.5 % - Perineural   20 mL - 8/17/2022 8:30:00 AM  bupivacaine 0.25 % - Perineural   20 mL - 8/17/2022 8:30:00 AM

## 2022-08-17 NOTE — ANESTHESIA PRE PROCEDURE
Department of Anesthesiology  Preprocedure Note       Name:  Vern Deleon   Age:  66 y.o.  :  1950                                          MRN:  [id number]         Date:  2022      Surgeon: Ravi Mon):  Flo Lloyd MD    Procedure: Procedure(s):  RIGHT TOTAL KNEE REPLACEMENT ROBOTIC ASSISTED    Medications prior to admission:   Prior to Admission medications    Medication Sig Start Date End Date Taking? Authorizing Provider   Ascorbic Acid (VITAMIN C) 250 MG tablet Take 250 mg by mouth in the morning. Historical Provider, MD   vitamin B-12 (CYANOCOBALAMIN) 100 MCG tablet Take 50 mcg by mouth in the morning. Historical Provider, MD   Cholecalciferol (VITAMIN D3) 1.25 MG (50261 UT) CAPS Take by mouth    Historical Provider, MD   valACYclovir (VALTREX) 1 g tablet Take 1 tablet by mouth 2 times daily For one week as needed. 3/8/22   Han Kennedy MD   lisinopril-hydroCHLOROthiazide (PRINZIDE;ZESTORETIC) 20-12.5 MG per tablet Take 1 tablet daily by mouth, okay to separate , if do not have in stock. 3/8/22   Han Kennedy MD   rosuvastatin (CRESTOR) 20 MG tablet Take 1 tablet by mouth daily Cancel pravastatin script. Patient taking differently: Take 20 mg by mouth daily Cancel pravastatin script.   Has not stopped for surgery 3/8/22   Han Kennedy MD   ibuprofen (ADVIL;MOTRIN) 600 MG tablet Take 600 mg by mouth every 6 hours as needed    Historical Provider, MD       Current medications:    Current Facility-Administered Medications   Medication Dose Route Frequency Provider Last Rate Last Admin    sodium chloride flush 0.9 % injection 5-40 mL  5-40 mL IntraVENous 2 times per day Juliet Santamaria MD        sodium chloride flush 0.9 % injection 5-40 mL  5-40 mL IntraVENous PRN Juliet Santamaria MD        0.9 % sodium chloride infusion   IntraVENous PRN Juliet Santamaria  mL/hr at 22 08 New Bag at 22 08    ceFAZolin (ANCEF) 2,000 mg in dextrose 5 % 50 mL IVPB (mini-bag)  2,000 mg IntraVENous Once Eligio Hunt MD        scopolamine (TRANSDERM-SCOP) transdermal patch 1 patch  1 patch TransDERmal Once Elva Cranker, MD           Allergies: Allergies   Allergen Reactions    Sulfa Antibiotics Rash     As a child       Problem List:    Patient Active Problem List   Diagnosis Code    Mixed hyperlipidemia E78.2    Colon polyp K63.5    Essential hypertension I10    Class 1 obesity due to excess calories without serious comorbidity with body mass index (BMI) of 33.0 to 33.9 in adult E66.09, Z68.33    History of colon cancer, stage I Z85.038    Benign neoplasm of skin of face D23.30    Ganglion M67.40    Hypertrophic and atrophic condition of skin L91.9, L90.9    Lumbar stenosis M48.061    Vitamin D deficiency E55.9    Family history of type 2 diabetes mellitus Z83.3    Carotid artery calcification, bilateral I65.23    Prediabetes R73.03    Atherosclerosis of abdominal aorta (HCC) I70.0    RUQ pain R10.11    Pre-op evaluation Z01.818    Primary osteoarthritis of right knee M17.11       Past Medical History:        Diagnosis Date    Arthritis     Colon cancer ([facility] [address] 10/22/2012    Colon polyp 10/22/2012    Essential hypertension 2015    Full dentures     HTN (hypertension) 2015    Hyperlipidemia 2011    Mixed hyperlipidemia 2011       Past Surgical History:        Procedure Laterality Date    APPENDECTOMY      COLECTOMY      COLONOSCOPY             Social History:    Social History     Tobacco Use    Smoking status: Former     Packs/day: 1.00     Years: 6.00     Pack years: 6.00     Types: Cigarettes     Quit date: 1982     Years since quittin.6    Smokeless tobacco: Never   Substance Use Topics    Alcohol use:  No                                Counseling given: Not Answered      Vital Signs (Current):   Vitals:    08/10/22 1141 22 0756   BP:  127/66   Pulse:  78   Resp:  18   Temp:  97 °F (36.1 °C) TempSrc:  Temporal   SpO2:  94%   Weight: 197 lb (89.4 kg)    Height: 5' 6.5\" (1.689 m)                                               BP Readings from Last 3 Encounters:   08/17/22 127/66   08/08/22 98/60   06/14/22 110/72       NPO Status: Time of last liquid consumption: 2300                        Time of last solid consumption: 2300                        Date of last liquid consumption: 08/16/22                        Date of last solid food consumption: 08/16/22    BMI:   Wt Readings from Last 3 Encounters:   08/10/22 197 lb (89.4 kg)   08/12/22 197 lb (89.4 kg)   08/08/22 197 lb 6.4 oz (89.5 kg)     Body mass index is 31.32 kg/m². CBC:   Lab Results   Component Value Date/Time    WBC 4.1 08/08/2022 11:55 AM    RBC 4.13 08/08/2022 11:55 AM    HGB 13.6 08/08/2022 11:55 AM    HCT 39.6 08/08/2022 11:55 AM    MCV 96.0 08/08/2022 11:55 AM    RDW 12.6 08/08/2022 11:55 AM     08/08/2022 11:55 AM       CMP:   Lab Results   Component Value Date/Time     08/08/2022 11:55 AM    K 3.6 08/08/2022 11:55 AM     08/08/2022 11:55 AM    CO2 28 08/08/2022 11:55 AM    BUN 8 08/08/2022 11:55 AM    CREATININE 0.7 08/08/2022 11:55 AM    GFRAA >60 08/08/2022 11:55 AM    GFRAA >60 10/22/2012 11:22 AM    AGRATIO 1.8 02/15/2022 09:38 AM    LABGLOM >60 08/08/2022 11:55 AM    GLUCOSE 85 08/08/2022 11:55 AM    PROT 6.5 02/15/2022 09:38 AM    PROT 6.9 10/22/2012 11:22 AM    CALCIUM 9.4 08/08/2022 11:55 AM    BILITOT 0.4 02/15/2022 09:38 AM    ALKPHOS 78 02/15/2022 09:38 AM    AST 15 02/15/2022 09:38 AM    ALT 11 02/15/2022 09:38 AM       POC Tests: No results for input(s): POCGLU, POCNA, POCK, POCCL, POCBUN, POCHEMO, POCHCT in the last 72 hours.     Coags:   Lab Results   Component Value Date/Time    PROTIME 13.5 08/08/2022 11:55 AM    INR 1.04 08/08/2022 11:55 AM    APTT 29.4 08/08/2022 11:55 AM       HCG (If Applicable): No results found for: PREGTESTUR, PREGSERUM, HCG, HCGQUANT     ABGs: No results found for: PHART, PO2ART, EHH6PAI, TJS3JTP, BEART, K5SPNUXU     Type & Screen (If Applicable):  No results found for: LABABO, LABRH    Drug/Infectious Status (If Applicable):  No results found for: HIV, HEPCAB    COVID-19 Screening (If Applicable): No results found for: COVID19        Anesthesia Evaluation  Patient summary reviewed and Nursing notes reviewed no history of anesthetic complications:   Airway: Mallampati: II  TM distance: >3 FB   Neck ROM: full  Mouth opening: > = 3 FB   Dental:    (+) upper dentures and lower dentures      Pulmonary: breath sounds clear to auscultation      (-) not a current smoker                           Cardiovascular:  Exercise tolerance: good (>4 METS),   (+) hypertension:, hyperlipidemia    (-) past MI, CAD, CABG/stent, dysrhythmias and  GUERRA      Rhythm: regular  Rate: normal                    Neuro/Psych:      (-) CVA           GI/Hepatic/Renal:        (-) liver disease and no renal disease       Endo/Other:    (+) DiabetesType II DM, , : arthritis: OA., .    (-) hypothyroidism               Abdominal:             Vascular:   + PVD, aortic or cerebral, . Other Findings:           Anesthesia Plan      general and regional     ASA 3     (73 yo F with PMHx OA, HLD, HTN who presents for right knee replacement. Discussed right adductor canal and IPACK nerve blocks    I discussed with the patient the risks and benefits to general anesthesia including possible anesthetic complications but not limited to: PONV, damage to the airway and surrounding structures (teeth, lips, gums, tongue, etc.), adverse reactions to medications, cardiac complications (MI, CHF, arrhythmias, etc.), respiratory complications (post-op ventilation, respiratory failure, etc.), neurologic complications (nerve damage, stroke, seizure), the potential for conversion to a general anesthetic, and death.  The patient was given the opportunity to ask questions and all questions were answered to the patient's satisfaction. Discussed with the patient the risks of nerve block including nerve injury, paresthesia, bleeding, infection, nausea, vomiting, local anesthetic systemic toxicity, and headache. The patient verbalized understanding and wished to proceed.  )  Induction: intravenous. MIPS: Postoperative opioids intended and Prophylactic antiemetics administered. Anesthetic plan and risks discussed with patient. Plan discussed with CRNA. This pre-anesthesia assessment may be used as a history and physical.    DOS STAFF ADDENDUM:    Pt seen and examined, chart reviewed (including anesthesia, drug and allergy history). No interval changes to history and physical examination. Anesthetic plan, risks, benefits, alternatives, and personnel involved discussed with patient. Patient verbalized an understanding and agrees to proceed.       Nubia Quintero MD  August 17, 2022  8:57 AM

## 2022-08-18 VITALS
TEMPERATURE: 97.5 F | RESPIRATION RATE: 18 BRPM | HEIGHT: 67 IN | DIASTOLIC BLOOD PRESSURE: 65 MMHG | SYSTOLIC BLOOD PRESSURE: 118 MMHG | HEART RATE: 71 BPM | OXYGEN SATURATION: 97 % | WEIGHT: 198.19 LBS | BODY MASS INDEX: 31.11 KG/M2

## 2022-08-18 LAB
ANION GAP SERPL CALCULATED.3IONS-SCNC: 8 MMOL/L (ref 3–16)
BUN BLDV-MCNC: 13 MG/DL (ref 7–20)
CALCIUM SERPL-MCNC: 9.1 MG/DL (ref 8.3–10.6)
CHLORIDE BLD-SCNC: 101 MMOL/L (ref 99–110)
CO2: 25 MMOL/L (ref 21–32)
CREAT SERPL-MCNC: 0.7 MG/DL (ref 0.6–1.2)
GFR AFRICAN AMERICAN: >60
GFR NON-AFRICAN AMERICAN: >60
GLUCOSE BLD-MCNC: 122 MG/DL (ref 70–99)
GLUCOSE BLD-MCNC: 129 MG/DL (ref 70–99)
PERFORMED ON: ABNORMAL
POTASSIUM SERPL-SCNC: 4.2 MMOL/L (ref 3.5–5.1)
SODIUM BLD-SCNC: 134 MMOL/L (ref 136–145)

## 2022-08-18 PROCEDURE — 2580000003 HC RX 258: Performed by: ORTHOPAEDIC SURGERY

## 2022-08-18 PROCEDURE — 97110 THERAPEUTIC EXERCISES: CPT

## 2022-08-18 PROCEDURE — 97535 SELF CARE MNGMENT TRAINING: CPT

## 2022-08-18 PROCEDURE — 6360000002 HC RX W HCPCS: Performed by: ORTHOPAEDIC SURGERY

## 2022-08-18 PROCEDURE — 97116 GAIT TRAINING THERAPY: CPT

## 2022-08-18 PROCEDURE — 97530 THERAPEUTIC ACTIVITIES: CPT

## 2022-08-18 PROCEDURE — 6370000000 HC RX 637 (ALT 250 FOR IP): Performed by: ORTHOPAEDIC SURGERY

## 2022-08-18 PROCEDURE — 36415 COLL VENOUS BLD VENIPUNCTURE: CPT

## 2022-08-18 PROCEDURE — G0378 HOSPITAL OBSERVATION PER HR: HCPCS

## 2022-08-18 PROCEDURE — 80048 BASIC METABOLIC PNL TOTAL CA: CPT

## 2022-08-18 RX ADMIN — ASPIRIN 81 MG: 81 TABLET, COATED ORAL at 09:10

## 2022-08-18 RX ADMIN — ROSUVASTATIN CALCIUM 20 MG: 20 TABLET, FILM COATED ORAL at 09:10

## 2022-08-18 RX ADMIN — OXYCODONE HYDROCHLORIDE 10 MG: 10 TABLET ORAL at 10:44

## 2022-08-18 RX ADMIN — SODIUM CHLORIDE, PRESERVATIVE FREE 5 ML: 5 INJECTION INTRAVENOUS at 00:22

## 2022-08-18 RX ADMIN — OXYCODONE HYDROCHLORIDE 10 MG: 10 TABLET ORAL at 06:46

## 2022-08-18 RX ADMIN — PREGABALIN 75 MG: 75 CAPSULE ORAL at 00:11

## 2022-08-18 RX ADMIN — PREGABALIN 75 MG: 75 CAPSULE ORAL at 09:10

## 2022-08-18 RX ADMIN — ACETAMINOPHEN 650 MG: 325 TABLET, FILM COATED ORAL at 06:45

## 2022-08-18 RX ADMIN — CEFAZOLIN 2000 MG: 2 INJECTION, POWDER, FOR SOLUTION INTRAMUSCULAR; INTRAVENOUS at 00:22

## 2022-08-18 RX ADMIN — LISINOPRIL AND HYDROCHLOROTHIAZIDE 1 TABLET: 12.5; 2 TABLET ORAL at 09:10

## 2022-08-18 RX ADMIN — ACETAMINOPHEN 650 MG: 325 TABLET, FILM COATED ORAL at 00:22

## 2022-08-18 RX ADMIN — MELOXICAM 7.5 MG: 7.5 TABLET ORAL at 09:10

## 2022-08-18 ASSESSMENT — PAIN SCALES - GENERAL
PAINLEVEL_OUTOF10: 7
PAINLEVEL_OUTOF10: 8
PAINLEVEL_OUTOF10: 0

## 2022-08-18 NOTE — PROGRESS NOTES
Huddle performed this morning including Nurse navigator Jan Golden, Physical therapist Maximino Guzmán, and Occupational therapist Kory Sams. Discussed plan of care, discharge plan, and dme needs if applicable for orthopedic total joint patient.   Electronically signed by Jeannette Eagle RN on 8/18/2022 at 9:10 AM

## 2022-08-18 NOTE — PROGRESS NOTES
Patient alert and oriented x4, discharged to home with documented belongings. IV removed with no complications. Transported out of \[facility]\"" by wheelchair. Reviewed discharge, follow up, and medication instructions with patient and patient verbalized understanding.  Electronically signed by Chloe Barrow RN on 8/18/2022 at 11:42 AM

## 2022-08-18 NOTE — PROGRESS NOTES
[facility] Orthopedic Surgery   Progress Note      S/P :  SUBJECTIVE  in bed. Alert and oriented. Pain is   described in right knee and with the intensity of moderate. Pain is described as aching.  at bedside. OBJECTIVE              Physical                      VITALS:  /65   Pulse 71   Temp 97.5 °F (36.4 °C) (Oral)   Resp 18   Ht 5' 6.5\" (1.689 m)   Wt 198 lb 3.1 oz (89.9 kg)   SpO2 97%   BMI 31.51 kg/m²                     MUSCULOSKELETAL:  right foot NVI. Wiggles toes to command. Able to plantarflex and dorsiflex ankle Pedal pulses are palpable. NEUROLOGIC:                                  Sensory:  Touch:  Right Lower Extremity:  normal                                                 Surgical wound appears clean and dry right knee with Prineo dressing Ice pad on.  DION hose on,     Data       CBC:   Lab Results   Component Value Date/Time    WBC 4.1 08/08/2022 11:55 AM    RBC 4.13 08/08/2022 11:55 AM    HGB 13.6 08/08/2022 11:55 AM    HCT 39.6 08/08/2022 11:55 AM    MCV 96.0 08/08/2022 11:55 AM    MCH 33.0 08/08/2022 11:55 AM    MCHC 34.3 08/08/2022 11:55 AM    RDW 12.6 08/08/2022 11:55 AM     08/08/2022 11:55 AM    MPV 7.6 08/08/2022 11:55 AM        WBC:    Lab Results   Component Value Date/Time    WBC 4.1 08/08/2022 11:55 AM        Hemoglobin/Hematocrit:    Lab Results   Component Value Date/Time    HGB 13.6 08/08/2022 11:55 AM    HCT 39.6 08/08/2022 11:55 AM        PT/INR:    Lab Results   Component Value Date/Time    PROTIME 13.5 08/08/2022 11:55 AM    INR 1.04 08/08/2022 11:55 AM              Current Inpatient Medications             Current Facility-Administered Medications: scopolamine (TRANSDERM-SCOP) transdermal patch 1 patch, 1 patch, TransDERmal, Once  lisinopril-hydroCHLOROthiazide (PRINZIDE;ZESTORETIC) 20-12.5 MG per tablet 1 tablet, 1 tablet, Oral, Daily  rosuvastatin (CRESTOR) tablet 20 mg, 20 mg, Oral, Daily  glucose chewable tablet 16 g, 4 tablet,

## 2022-08-18 NOTE — PROGRESS NOTES
Data- discharge order received, patient verbalized agreement to discharge, disposition to previous residence, needs noted for HHC/DME and informed Claire Villa NP. Action- discharge instructions prepared and given to patient and  Hans Meter , patient and [place] Meter verbalized understanding. Medication information packet given r/t NEW and/or CHANGED prescriptions emphasizing name/purpose/side effects, pt verbalized understanding. Discharge instruction summary: Diet- general, Activity- wbat, Primary Care Physician as follows: Cy Lebron [phone]. f/u appointment with orthopedic office noted below, immunizations reviewed and discussed with patient, prescription medications to be filled by retail pharmacy and then delivered. Inpatient surgical procedure precautions reviewed: Varun Rodriguez Educated patient on using incentive spirometer post-discharge per surgeon's instructions. Neurovascular checks performed and moderate dorsi and plantar flexions noted bilaterally, toes appear appropriate to ethnicity in color, warm to touch, patient denies numbness or tingling. Incision site  prineo glue dressing assessed and is  clean,dry, and intact, no signs of redness, drainage, or odor noted. patient's bedside AIDA barth notified of patient completing discharge instructions. Nurse Navigator and Orthopedic Office contact information on discharge instructions and provided to patient. Response- . Medications delivered to patient via meds to bed program. Scopolamine patch rx handed to patient and instructed to fill if needed. Disposition is home with [facility]  (DME delivered to patient by [address] [place] [address]), to be transported by  Hans Lozano .     Future Appointments   Date Time Provider Eder Cuevas   9/1/2022 10:00 AM Lord Margie MD [facility]   9/12/2022  2:20 PM [facility] [address]           Electronically signed by Bran Malone RN on 8/18/2022 at 10:43 AM

## 2022-08-18 NOTE — CARE COORDINATION
[facility]    DC order noted, all docs needed have been faxed to [facility] for home care services.     Home care to see patient within 24-48 hrs      Dominic Rondon RN, BSN CTN  [facility], [facility] [phone]

## 2022-08-18 NOTE — PLAN OF CARE
Problem: Discharge Planning  Goal: Discharge to home or other facility with appropriate resources  Outcome: Progressing  Flowsheets (Taken 8/18/2022 0750)  Discharge to home or other facility with appropriate resources:   Identify barriers to discharge with patient and caregiver   Arrange for needed discharge resources and transportation as appropriate   Identify discharge learning needs (meds, wound care, etc)     Problem: Chronic Conditions and Co-morbidities  Goal: Patient's chronic conditions and co-morbidity symptoms are monitored and maintained or improved  Outcome: Progressing  Flowsheets (Taken 8/18/2022 0750)  Care Plan - Patient's Chronic Conditions and Co-Morbidity Symptoms are Monitored and Maintained or Improved: Monitor and assess patient's chronic conditions and comorbid symptoms for stability, deterioration, or improvement     Problem: Cardiovascular - Adult  Goal: Maintains optimal cardiac output and hemodynamic stability  Outcome: Progressing  Flowsheets (Taken 8/18/2022 0750)  Maintains optimal cardiac output and hemodynamic stability:   Monitor blood pressure and heart rate   Monitor urine output and notify Licensed Independent Practitioner for values outside of normal range     Problem: Skin/Tissue Integrity - Adult  Goal: Incisions, wounds, or drain sites healing without S/S of infection  Outcome: Progressing  Flowsheets (Taken 8/18/2022 0750)  Incisions, Wounds, or Drain Sites Healing Without Sign and Symptoms of Infection: ADMISSION and DAILY: Assess and document risk factors for pressure ulcer development     Problem: Musculoskeletal - Adult  Goal: Return mobility to safest level of function  Outcome: Progressing  Flowsheets (Taken 8/18/2022 0750)  Return Mobility to Safest Level of Function:   Assess patient stability and activity tolerance for standing, transferring and ambulating with or without assistive devices   Assist with transfers and ambulation using safe patient handling equipment as needed     Problem: Musculoskeletal - Adult  Goal: Maintain proper alignment of affected body part  Outcome: Progressing  Flowsheets (Taken 8/18/2022 0750)  Maintain proper alignment of affected body part: Support and protect limb and body alignment per provider's orders     Problem: Musculoskeletal - Adult  Goal: Return ADL status to a safe level of function  Outcome: Progressing  Flowsheets (Taken 8/18/2022 0750)  Return ADL Status to a Safe Level of Function:   Administer medication as ordered   Assess activities of daily living deficits and provide assistive devices as needed     Problem: Infection - Adult  Goal: Absence of infection at discharge  Outcome: Progressing  Flowsheets (Taken 8/18/2022 0750)  Absence of infection at discharge:   Assess and monitor for signs and symptoms of infection   Monitor lab/diagnostic results     Problem: Infection - Adult  Goal: Absence of infection during hospitalization  Outcome: Progressing  Flowsheets (Taken 8/18/2022 0750)  Absence of infection during hospitalization:   Assess and monitor for signs and symptoms of infection   Monitor lab/diagnostic results     Problem: Metabolic/Fluid and Electrolytes - Adult  Goal: Glucose maintained within prescribed range  Outcome: Progressing  Flowsheets (Taken 8/18/2022 0750)  Glucose maintained within prescribed range:   Monitor blood glucose as ordered   Assess for signs and symptoms of hyperglycemia and hypoglycemia     Problem: Pain  Goal: Verbalizes/displays adequate comfort level or baseline comfort level  Outcome: Progressing  Flowsheets (Taken 8/18/2022 0750)  Verbalizes/displays adequate comfort level or baseline comfort level:   Encourage patient to monitor pain and request assistance   Assess pain using appropriate pain scale     Problem: Safety - Adult  Goal: Free from fall injury  Outcome: Progressing  Flowsheets (Taken 8/18/2022 0750)  Free From Fall Injury: Instruct family/caregiver on patient safety Problem: ABCDS Injury Assessment  Goal: Absence of physical injury  Outcome: Progressing  Flowsheets (Taken 8/18/2022 0750)  Absence of Physical Injury: Implement safety measures based on patient assessment     Problem: Skin/Tissue Integrity  Goal: Absence of new skin breakdown  Description: 1. Monitor for areas of redness and/or skin breakdown  2. Assess vascular access sites hourly  3. Every 4-6 hours minimum:  Change oxygen saturation probe site  4. Every 4-6 hours:  If on nasal continuous positive airway pressure, respiratory therapy assess nares and determine need for appliance change or resting period. Outcome: Progressing  Note: Pt assessed for skin break down. Skin was warm and dry to touch. Pt cannot regulate head of bed without assistance. Pt being turned or repositioned at least every 2 hours to prevent skin breakdown. Will continue to monitor and assess.

## 2022-08-18 NOTE — PROGRESS NOTES
Patient is alert & oriented x4, x1 assist with walker, 2/4 bed rails up, bed in lowest position, fall precautions in place, call light within reach. Morning medications given without complications. Scopalamine patch remains in place behind R ear for post-op nausea. Prineo dressing in place on R knee and is clean, dry and intact. Pt's  is at bedside. Will cont to monitor and reassess.  Electronically signed by Dinora Rehman RN on 8/18/2022 at 9:16 AM

## 2022-08-18 NOTE — PROGRESS NOTES
CLINICAL PHARMACY NOTE: MEDS TO BEDS    Total # of Prescriptions Filled: 4   The following medications were delivered to the patient:  Current Discharge Medication List        START taking these medications    Details   oxyCODONE (ROXICODONE) 5 MG immediate release tablet Take 1-2 tablets by mouth every 6 hours as needed for Pain for up to 5 days. Qty: 40 tablet, Refills: 0    Comments: Reduce doses taken as pain becomes manageable  Associated Diagnoses: Arthritis of right knee      aspirin 81 MG EC tablet Take 1 tablet by mouth 2 times daily for 14 days  Qty: 28 tablet, Refills: 0      meloxicam (MOBIC) 7.5 MG tablet Take 1 tablet by mouth daily for 5 days  Qty: 5 tablet, Refills: 0      pregabalin (LYRICA) 75 MG capsule Take 1 capsule by mouth 2 times daily for 14 days.   Qty: 28 capsule, Refills: 0    Associated Diagnoses: Arthritis of right knee      scopolamine (TRANSDERM-SCOP) transdermal patch Place 1 patch onto the skin every 72 hours  Qty: 2 patch, Refills: 0               Additional Documentation:  Scopolamine transdermal patch was a paper prescription given to patient

## 2022-08-18 NOTE — PROGRESS NOTES
Physical Therapy  Facility/Department: [facility]  ORTHOPEDICS  Physical Therapy Initial Assessment    Name: Caden Gan  : 1950  MRN: [id number]  Date of Service: 2022    Assessment / Discharge Recommendations:  -progressing well for discharge to home today  -[facility] has issued rolling walker for home use  -she has no steps to enter home to stay in lower level this evening (Home PT to practice steps tomorrow)  -instructed in initial HEP and general activity to do until Home PT takes charge of care  -aarom ~5 to ~70 degrees        Patient Diagnosis(es): The primary encounter diagnosis was Arthritis of right knee. A diagnosis of Mixed hyperlipidemia was also pertinent to this visit. Past Medical History:  has a past medical history of Arthritis, Colon cancer ([facility] [address] Colon polyp, Essential hypertension, Full dentures, HTN (hypertension), Hyperlipidemia, and Mixed hyperlipidemia. Past Surgical History:  has a past surgical history that includes Colonoscopy; Appendectomy; colectomy (); and Total knee arthroplasty (Right, 2022). Body Structures, Functions, Activity Limitations Requiring Skilled Therapeutic Intervention: Decreased ADL status; Decreased ROM; Increased pain;Decreased balance  Activity Tolerance  Activity Tolerance: Patient tolerated treatment well     Plan   Plan  Current Treatment Recommendations: Transfer training, ADL/Self-care training, ROM, Gait training, Stair training, Positioning, Modalities, Patient/Caregiver education & training, Therapeutic activities  Plan Comment: 1-4 sessions  Safety Devices  Type of Devices: Call light within reach, Chair alarm in place, Gait belt, Patient at risk for falls, Nurse notified, Left in chair     Restrictions  Restrictions/Precautions  Restrictions/Precautions: Weight Bearing, Fall Risk  Lower Extremity Weight Bearing Restrictions  Right Lower Extremity Weight Bearing: Weight Bearing As Tolerated     Subjective   General  Chart Reviewed: Yes  Additional Pertinent Hx: here for elective right TKR  Response To Previous Treatment: Patient with no complaints from previous session. Family / Caregiver Present: Yes  Follows Commands: Within Functional Limits  Subjective  Subjective: to room along with OT to patient resting in bed- she is alert oriented and agreeable to PT OT session and to assess readiness for home today     Social/Functional History  Social/Functional History  Lives With: Spouse  Type of Home: House  Home Layout: Multi-level, Laundry in basement, 1/2 bath on main level, Bed/Bath upstairs, Able to Live on Main level with bedroom/bathroom (Plans to stay on first level, sleeps in recliner chair and has 1/2 bath in family room)  Home Access: Stairs to enter with rails  Bathroom Shower/Tub: Tub/Shower unit  Bathroom Toilet: Standard (standard commode 1/2 bath, full bath comfort height)  Bathroom Accessibility: [facility]  Has the patient had two or more falls in the past year or any fall with injury in the past year?: No  ADL Assistance: Independent  Homemaking Assistance: Independent  Ambulation Assistance: Independent  Transfer Assistance: Independent  Active : Yes  Additional Comments: Here for elective Right TKR: Reports Left knee\"okay\", spinal stenosis.   Vision/Hearing  Vision  Vision: Within Functional Limits  Hearing  Hearing: Within functional limits   Objective   Gross Assessment  Tone: Normal  Sensation: Intact   Strength Other  Other: grossly functional in non-surgical limbs  Balance  Sitting: Intact  Standing: With support (Standing with Rw with SBA for ADL tasks)  Bed mobility  Supine to Sit: Contact guard assistance  Sit to Supine: Unable to assess  Transfers  Sit to Stand: Stand by assistance;Contact guard assistance  Stand to sit: Stand by assistance  Ambulation  Surface: level tile  Device: Rolling Walker  Assistance: Stand by assistance  Quality of Gait: step to progressing to partial step through pattern with good heel contact and good weight bearing  -adequate base of support  Distance: in room for several passes for >50 feet overall  Comments: stable on the walker  More Ambulation?: No  Stairs/Curb  Stairs?: No     Balance  Comments: stedy with transfers and ambulation using rolling walker    Goals  Short Term Goals  Time Frame for Short term goals: 1-2 days  Short term goal 1: bed mobility at [address] [place] [address] goal 2: transfers at supervision/cga  Short term goal 3: ambulation at supervision/cga wbat rolling walker for household distances    -steps as needed for home entry at [address] [place] [address] goal 4: exercises at supervision  Patient Goals   Patient goals : relief of chronic right knee pain and good function       Education  Patient Education  Education Given To: Patient; Family  Education Provided: Home Exercise Program;Transfer Training;ADL Adaptive Strategies  Education Method: Demonstration;Verbal  Barriers to Learning: None  Education Outcome: Verbalized understanding;Continued education needed (home PT to reinforce)      Therapy Time   Individual Concurrent Group Co-treatment   Time In           Time Out  (seen in 2 sessions for ~60 minutes overall)         Minutes                   Chino Barnes, PT

## 2022-08-18 NOTE — PROGRESS NOTES
Occupational Therapy  Facility/Department: [address] ORTHOPEDICS  Occupational Daily Treatment Note      Name: Mikaela Ott  : 1950  MRN: [id number]  Date of Service: 2022    Discharge Recommendations:  2-3 sessions per week, Patient would benefit from continued therapy after discharge, Home with Home health OT, 24 hour supervision or assist          Patient Diagnosis(es): The primary encounter diagnosis was Arthritis of right knee. A diagnosis of Mixed hyperlipidemia was also pertinent to this visit. Past Medical History:  has a past medical history of Arthritis, Colon cancer ([facility] [address] Colon polyp, Essential hypertension, Full dentures, HTN (hypertension), Hyperlipidemia, and Mixed hyperlipidemia. Past Surgical History:  has a past surgical history that includes Colonoscopy; Appendectomy; colectomy (); and Total knee arthroplasty (Right, 2022). Treatment Diagnosis: impaired ADL/fxl mobility  Mikaela Ott scored a 20/24 on the AM-PAC ADL Inpatient form. Current research shows that an AM-PAC score of 18 or greater is typically associated with a discharge to the patient's home setting. Based on the patient's AM-PAC score, and their current ADL deficits, it is recommended that the patient have 2-3 sessions per week of Occupational Therapy at d/c to increase the patient's independence. At this time, this patient demonstrates the endurance and safety to discharge home with home (home vs OP services) and a follow up treatment frequency of 2-3x/wk. Please see assessment section for further patient specific details. If patient discharges prior to next session this note will serve as a discharge summary. Please see below for the latest assessment towards goals.    HOME HEALTH CARE: LEVEL 3 SAFETY       -Initial home health evaluation to occur within 24-48 hours, in patient home   -Home health agency to establish plan of care for patient over 60 day period   -Medication Reconciliation -PT/OT/Speech evaluations in home within 24-48 hours of discharge; including  -DME and home safety   -Frontload therapy 5 days, then 3x a week   -OT to evaluate if patient has [address] needs for personal care   - evaluation within 24-48 hours, includes evaluation of resources   and insurance to determine [place], [place], LTC, and Medicaid options   -PCP Visit scheduled within three to seven days of discharge       Assessment   Performance deficits / Impairments: Decreased functional mobility ; Decreased endurance;Decreased ADL status; Decreased safe awareness;Decreased high-level IADLs;Decreased balance  Assessment: Discussed with OTR am pac score is 20. Anticipate that patient will be safe to return home with family to provide 24/7 assist/supervision and home OT. Patient completed supine to sit with CGA. SBA for sit<>stand from EOB to RW to commode to recliner chair with mild cues for hand placement. Functional mobility with RW with SBA, slow steady gait with no overt LOB noted. Stood for grooming tasks with SBA. Toileting with SBA. Dressed lower body with assist for right shoe and el hose. Plan is for discharge to home today. REQUIRES OT FOLLOW-UP: Yes  Activity Tolerance  Activity Tolerance: Patient Tolerated treatment well        Plan   Plan  Times per Week: 2-3 sessions  Current Treatment Recommendations: Strengthening, ROM, Balance training, Functional mobility training, Endurance training, Pain management, Safety education & training, Self-Care / ADL, Home management training, Modalities, Positioning     Restrictions  Restrictions/Precautions  Restrictions/Precautions: Weight Bearing, Fall Risk  Lower Extremity Weight Bearing Restrictions  Right Lower Extremity Weight Bearing: Weight Bearing As Tolerated    Subjective   General  Chart Reviewed: Yes  Patient assessed for rehabilitation services?: Yes  Additional Pertinent Hx: 67 yo female admiited for elective R TKA.  PMH: HTN, colon CA, lumbar stenosis,  Response to previous treatment: Patient with no complaints from previous session  Family / Caregiver Present: Yes  Referring Practitioner: Guilherme Mccracken MD  Diagnosis: R TKA  Subjective  Subjective: Patient supine in bed upon arrival to room. Patient agreeable to therapy. Mild reports of pain, ice placed at end of session     Social/Functional History  Social/Functional History  Lives With: Spouse  Type of Home: House  Home Layout: Multi-level, Laundry in basement, 1/2 bath on main level, Bed/Bath upstairs, Able to Live on Main level with bedroom/bathroom (Plans to stay on first level, sleeps in recliner chair and has 1/2 bath in family room)  Home Access: Stairs to enter with rails  Bathroom Shower/Tub: Tub/Shower unit  Bathroom Toilet: Standard (standard commode 1/2 bath, full bath comfort height)  Bathroom Accessibility: [facility] accessible  Has the patient had two or more falls in the past year or any fall with injury in the past year?: No  ADL Assistance: Independent  Homemaking Assistance: Independent  Ambulation Assistance: Independent  Transfer Assistance: Independent  Active : Yes  Additional Comments: Here for elective Right TKR: Reports Left knee\"okay\", spinal stenosis. Objective         Safety Devices  Type of Devices: Call light within reach; Chair alarm in place;Gait belt;Patient at risk for falls;Nurse notified; Left in chair  Balance  Sitting: Intact  Standing: With support (Standing with Rw with SBA for ADL tasks)  Toilet Transfers  Toilet - Technique: Ambulating  Equipment Used: Grab bars  Toilet Transfer: Stand by assistance  Toilet Transfers Comments: mild cues for hand placement     ADL  Grooming: Stand by assistance  Grooming Skilled Clinical Factors: SBA standing in front of sink to wash face, hands and brush teeth  UE Dressing: Independent  LE Dressing: Minimal assistance;Stand by assistance  LE Dressing Skilled Clinical Factors: SBA to don shorts over feet and hips, Min A for right shoe  Toileting: Stand by assistance        Bed mobility  Supine to Sit: Contact guard assistance (HOB elevated and side rail)  Sit to Supine: Unable to assess (up in chair at end of session)  Transfers  Sit to stand: Stand by assistance  Stand to sit: Stand by assistance  Transfer Comments: SBA for sit<>stand from EOB to RW to commode to recliner chair with mild cues for hand placement. Functional mobility with RW with SBA, slow steady gait with no overt LOB noted. Cognition  Overall Cognitive Status: WFL  Orientation  Overall Orientation Status: Within Functional Limits                  Education Given To: Patient; Family  Education Provided: Role of Therapy;Plan of Care;Transfer Training;ADL Adaptive Strategies  Education Provided Comments: Ice therapy, el halee for 2 weeks and importance of mobility          AM-PAC Score        AM-[facility] Inpatient Daily Activity Raw Score: 20 (08/18/22 1017)  AM-PAC Inpatient ADL T-Scale Score : 42.03 (08/18/22 1017)  ADL Inpatient CMS 0-100% Score: 38.32 (08/18/22 1017)  ADL Inpatient CMS G-Code Modifier : CJ (08/18/22 1017)        Goals  Short Term Goals  Time Frame for Short term goals: prior to d/c: all goals ongoing  Short Term Goal 1: toileting SUP  Short Term Goal 2: LB dressing SUP  Short Term Goal 3: UB dressing SUP  Short Term Goal 4: tolerate 5 min fxl standing task SUP  Short Term Goal 5: fxl tx and mobility with RW household distances SUP  Patient Goals   Patient goals : return home       Therapy Time   Individual Concurrent Group Co-treatment   Time In 0900         Time Out 1015         Minutes 75               Electronically signed by Yamel Matos [id number] on 8/18/2022 at 10:30 AM

## 2022-08-19 ENCOUNTER — TELEPHONE (OUTPATIENT)
Dept: ORTHOPEDIC SURGERY | Age: 72
End: 2022-08-19

## 2022-08-22 ENCOUNTER — TELEPHONE (OUTPATIENT)
Dept: ORTHOPEDICS UNIT | Age: 72
End: 2022-08-22

## 2022-08-29 DIAGNOSIS — M17.11 ARTHRITIS OF RIGHT KNEE: ICD-10-CM

## 2022-08-29 RX ORDER — OXYCODONE HYDROCHLORIDE 5 MG/1
5-10 TABLET ORAL EVERY 6 HOURS PRN
Qty: 40 TABLET | Refills: 0 | Status: SHIPPED | OUTPATIENT
Start: 2022-08-29 | End: 2022-09-28 | Stop reason: SDUPTHER

## 2022-09-01 ENCOUNTER — OFFICE VISIT (OUTPATIENT)
Dept: ORTHOPEDIC SURGERY | Age: 72
End: 2022-09-01

## 2022-09-01 VITALS — RESPIRATION RATE: 16 BRPM | HEIGHT: 67 IN | BODY MASS INDEX: 31.08 KG/M2 | WEIGHT: 198 LBS

## 2022-09-01 DIAGNOSIS — Z96.651 STATUS POST TOTAL RIGHT KNEE REPLACEMENT: Primary | ICD-10-CM

## 2022-09-01 PROCEDURE — 99024 POSTOP FOLLOW-UP VISIT: CPT | Performed by: PHYSICIAN ASSISTANT

## 2022-09-06 ENCOUNTER — HOSPITAL ENCOUNTER (OUTPATIENT)
Dept: PHYSICAL THERAPY | Age: 72
Setting detail: THERAPIES SERIES
Discharge: HOME | End: 2022-09-06
Payer: MEDICARE

## 2022-09-06 PROCEDURE — 97110 THERAPEUTIC EXERCISES: CPT | Performed by: PHYSICAL THERAPIST

## 2022-09-06 PROCEDURE — 97161 PT EVAL LOW COMPLEX 20 MIN: CPT | Performed by: PHYSICAL THERAPIST

## 2022-09-06 NOTE — DISCHARGE SUMMARY
Physician Discharge Summary     Patient ID:  Vern Deleon  [id number]  6 y.o.  1950    Admit date: 8/17/2022    Discharge date and time: 8/18/2022 11:35 AM     Admitting Physician: Flo Lloyd MD     Discharge Physician: Lakia Sims    Admission Diagnoses: Arthritis of right knee [M17.11]    Discharge Diagnoses: right knee OA    Admission Condition: good    Discharged Condition: good    Indication for Admission: Failed conservative treatment as outpatient for joint pain including PT and pain meds. This patient was then electively scheduled for total joint replacement surgery    Surgical procedure: right TKA    Consults: PT OT SS    This patient had no postoperative complications. They has PT and OT for ADL's . IV and PO pain med for pain control and was eventually DC in stable condition    Treatments: analgesia,  therapies: PT OT,  and surgery      Disposition: home    Patient Instructions:   [unfilled]  Activity: activity as tolerated  Diet: regular diet  Wound Care: keep wound clean and dry    Follow-up with GARLAND Holt in 2 weeks.     Signed:  FRANKLIN Lozoya CNP  9/6/2022  2:18 PM

## 2022-09-06 NOTE — PLAN OF CARE
[address] [facility] [place]. [place] [address]  Phone: [phone]   Fax:     [phone]          Dawson Calix    Dear  Dr. Roberson Members    We had the pleasure of evaluating the following patient for physical therapy services at [facility]. A summary of our findings can be found in the initial assessment below. This includes our plan of care. If you have any questions or concerns regarding these findings, please do not hesitate to contact me at the office phone number checked above. Thank you for the referral.       Physician Signature:_______________________________Date:__________________  By signing above (or electronic signature), therapists plan is approved by physician        Patient: Kev Alvares   : 1950   MRN: [id number]  Referring Physician:  Dr. Karol Mullins      Evaluation Date: 2022      Medical Diagnosis Information:   Diagnosis: M17.11 (ICD-10-CM) - Primary osteoarthritis of right knee           Treatment Diagnosis: decreased abilty to ambualte and function   DOS 22                                        Insurance information: PT Insurance Information: [employer]                                              Precautions/ Contra-indications: recent surg  Latex Allergy:  [x]NO      []YES  Preferred Language for Healthcare:   [x]English       []other:    C-SSRS Triggered by Intake questionnaire (Past 2 wk assessment ):   [x] No, Questionnaire did not trigger screening.   [] Yes, Patient intake triggered C-SSRS Screening      [] C-SSRS Screening completed  [] PCP notified via Epic     SUBJECTIVE:  R knee pain. Pt failed conservative therapy and underwent R TKA on 22. Pt has been having negative response from pain medication and has stopped taking them. She is really stiff today and not feeling well.  Pain is located in R knee joint.    Relevant Medical History: colon cancer; L knee surg in 2021; HBP controlled; Functional Outcome Measure: FOTO = 38- pre surg;                      9/6/22 FOTO = 34    Pain Scale: 6/10  Easing factors: rest  Provocative factors: use     Type: [x]Constant   []Intermittent  []Radiating []Localized []other:     Numbness/Tingling: denies    Occupation/School: bank teller, on feet for 8 hr shift;       OBJECTIVE:     Girth measurements: L 43 cm @ joint line; 45 cm @ superior patella; R 47 cm @ joint line; 48 cm @ superior patella;     Palpation: tender around knee joint generally into lower leg;    Functional Mobility/Transfers: ind but slow and cautious w/ R LE for transfers;    Posture: ant tilt , increased lumbar lordosis, genu valgus noted      Gait- ambulates with er of right LE, min limp noted using SPC, decreased stance phase and shortened stride length;    Reflexes/Sensation:    []Dermatomes/Myotomes intact    []Reflexes equal and normal bilaterally   [x]Other: intact to light touch in LE's     PROM AROM    L R L R   Hip Flexion 110 100 100 100   Knee Flexion 120 120 120 90 @ SOB  (110 pre surg)   Knee Extension 0 -15 0 -3 (-20 pre surg)       9/6/22 pre surg measurements  Strength (0-5) Left Right   Hip Flexion - seated 5 4   Hip Abduction - sidelyling 5 4   Hip Extension 5 4   Quads 27# Poor (17#)   Hamstrings 5 4   Ankle DF 5 4   Ankle PF 5 4        Flexibility     Hamstrings (90/90) -10 -15   ITB (Maicol) tight tight   Quads (Ely's) tighit 90   Hip Flexor (Jerry) tight tight        Girth     Mid patella 44 45   Suprapatellar 48 49       Joint mobility: patellofemoral    []Normal    [x]Hypo   []Hyper         Functional testing Prehab        Date    4 week f/u   Date    8 week f/u  Date    D/C  Date    7/20/22      TUG 12      30 second sit to stand 9      6 minute walk              Balance       Narrow HODAN 8      Semi tandem 6      Tandem  5      SLS 5             Knee AROM       Knee Extension MMT R/L L=27  R=17      Hip aBduction MMT R/L L=40  R=30      WOMAC                              [x] Patient history, allergies, meds reviewed. Medical chart reviewed. See intake form. Review Of Systems (ROS):  [x]Performed Review of systems (Integumentary, CardioPulmonary, Neurological) by intake and observation. Intake form has been scanned into medical record. Patient has been instructed to contact their primary care physician regarding ROS issues if not already being addressed at this time. Co-morbidities/Complexities (which will affect course of rehabilitation):   []None           Arthritic conditions   []Rheumatoid arthritis (M05.9)  []Osteoarthritis (M19.91)   Cardiovascular conditions   [x]Hypertension (I10)  [x]Hyperlipidemia (E78.5)  []Angina pectoris (I20)  []Atherosclerosis (I70)   Musculoskeletal conditions   []Disc pathology   []Congenital spine pathologies   []Prior surgical intervention  []Osteoporosis (M81.8)  []Osteopenia (M85.8)   Endocrine conditions   []Hypothyroid (E03.9)  []Hyperthyroid Gastrointestinal conditions   []Constipation (I77.55)   Metabolic conditions   []Morbid obesity (E66.01)  []Diabetes type 1(E10.65) or 2 (E11.65)   []Neuropathy (G60.9)     Pulmonary conditions   []Asthma (J45)  []Coughing   []COPD (J44.9)   Psychological Disorders  []Anxiety (F41.9)  []Depression (F32.9)   []Other:   [x]Other:   colon cancer       Barriers to/and or personal factors that will affect rehab potential:              []Age  []Sex    []Smoker              []Motivation/Lack of Motivation                        []Co-Morbidities              []Cognitive Function, education/learning barriers              []Environmental, home barriers              []profession/work barriers  []past PT/medical experience  []other:  Justification:     Falls Risk Assessment (30 days):   [x] Falls Risk assessed and no intervention required.   [] Falls Risk assessed and Patient requires intervention due to being higher risk TUG score (>12s at risk):     [] Falls education provided, including         ASSESSMENT: Pt is 65 y/o female that is 3 weeks from R TKA. She has limited ROM, deficits in strength, gt deviations and pain limiting function in R LE. She did not tolerate therapy well today but had been doing better w/ HHPT. Pt should benefit from therapy to address deficits and progress toward goals and regain function from R LE. Functional Impairments:     []Noted lumbar/proximal hip/LE joint hypomobility   [x]Decreased LE functional ROM   []Decreased core/proximal hip strength and neuromuscular control   [x]Decreased LE functional strength   [x]Reduced balance/proprioceptive control   []other:      Functional Activity Limitations (from functional questionnaire and intake)   []Reduced ability to tolerate prolonged functional positions   []Reduced ability or difficulty with changes of positions or transfers between positions   []Reduced ability to maintain good posture and demonstrate good body mechanics with sitting, bending, and lifting   [x]Reduced ability to sleep   [x] Reduced ability or tolerance with driving and/or computer work   [x]Reduced ability to perform lifting, carrying tasks   [x]Reduced ability to squat   []Reduced ability to forward bend   [x]Reduced ability to ambulate prolonged functional periods/distances/surfaces   [x]Reduced ability to ascend/descend stairs   [x]Reduced ability to run, hop, cut or jump   []other:    Participation Restrictions   [x]Reduced participation in self care activities   [x]Reduced participation in home management activities   [x]Reduced participation in work activities   [x]Reduced participation in social activities. [x]Reduced participation in sport/recreation activities. Classification :    []Signs/symptoms consistent with post-surgical status including decreased ROM, strength and function.    []Signs/symptoms consistent with joint sprain/strain   []Signs/symptoms consistent with patella-femoral syndrome   [x]Signs/symptoms consistent with knee OA/hip OA   []Signs/symptoms consistent with internal derangement of knee/Hip   []Signs/symptoms consistent with functional hip weakness/NMR control      []Signs/symptoms consistent with tendinitis/tendinosis    []signs/symptoms consistent with pathology which may benefit from Dry needling      []other:      Prognosis/Rehab Potential:      []Excellent   [x]Good    []Fair   []Poor    Tolerance of evaluation/treatment:    []Excellent   []Good    [x]Fair   []Poor    Physical Therapy Evaluation Complexity Justification  [x] A history of present problem with:  [] no personal factors and/or comorbidities that impact the plan of care;  [x]1-2 personal factors and/or comorbidities that impact the plan of care  []3 personal factors and/or comorbidities that impact the plan of care  [x] An examination of body systems using standardized tests and measures addressing any of the following: body structures and functions (impairments), activity limitations, and/or participation restrictions;:  [] a total of 1-2 or more elements   [x] a total of 3 or more elements   [] a total of 4 or more elements   [x] A clinical presentation with:  [x] stable and/or uncomplicated characteristics   [] evolving clinical presentation with changing characteristics  [] unstable and unpredictable characteristics;   [x] Clinical decision making of [] low, [] moderate, [] high complexity using standardized patient assessment instrument and/or measurable assessment of functional outcome.     [x] EVAL (LOW) 54693 (typically 30 minutes face-to-face)  [] EVAL (MOD) 09466 (typically 30 minutes face-to-face)  [] EVAL (HIGH) 18810 (typically 45 minutes face-to-face)  [] RE-EVAL     PLAN:  Frequency/Duration:  2 days per week for 6 Weeks:  Interventions:  [x]  Therapeutic exercise including: strength training, ROM, for Lower extremity and core   [x]  NMR activation and proprioception for LE, Glutes and Core   [x]  Manual therapy as indicated for LE, Hip and spine to include: Dry Needling/IASTM, STM, PROM, Gr I-IV mobilizations, manipulation. [x] Modalities as needed that may include: thermal agents, E-stim, Biofeedback, US, iontophoresis as indicated  [x] Patient education on joint protection, postural re-education, activity modification, progression of HEP. GOALS:  Patient stated goal: full function of R knee to return to work  [] Progressing: [] Met: [] Not Met: [] Adjusted    Therapist goals for Patient:   Short Term Goals: To be achieved in: 2 weeks  1. Independent in HEP and progression per patient tolerance, in order to prevent re-injury. [] Progressing: [] Met: [] Not Met: [] Adjusted  2. Patient will have a decrease in pain to facilitate improvement in movement, function, and ADLs as indicated by Functional Deficits. [] Progressing: [] Met: [] Not Met: [] Adjusted    Long Term Goals: To be achieved in: 12 weeks  1. Functional index score of 61 or more for FOTO to assist with reaching prior level of function. [] Progressing: [] Met: [] Not Met: [] Adjusted  2. Patient will demonstrate increased AROM to 0-125 deg to allow for proper joint functioning as indicated by patients Functional Deficits. [] Progressing: [] Met: [] Not Met: [] Adjusted  3. Patient will demonstrate an increase in Strength to good proximal hip strength and control, within 5lb HHD in LE to allow for proper functional mobility as indicated by patients Functional Deficits. [] Progressing: [] Met: [] Not Met: [] Adjusted  4. Patient will return to 90% functional activities without increased symptoms or restriction. [] Progressing: [] Met: [] Not Met: [] Adjusted  5.  Pt will be able to return to work w/ min to no limitations standing 8 hr/day. (patient specific functional goal)    [] Progressing: [] Met: [] Not Met: [] Adjusted     Electronically signed by:  Navi Ladd, PT, MS, OMT-C    Physical Therapist [place] license #[id number]  Physical Therapist [place] license #[id number]

## 2022-09-06 NOTE — FLOWSHEET NOTE
[facility] [place].  [place] [address]  Phone: [phone]   Fax:     [phone]    Physical Therapy Treatment Note/ Progress Report:       Date:  2022    Patient Name:  Dell Mcneil    :  1950  MRN: [id number]  Restrictions/Precautions:  hx of cancer; recent surg  Medical/Treatment Diagnosis Information:  Diagnosis: M17.11 (ICD-10-CM) - Primary osteoarthritis of right knee   Treatment Diagnosis: decreased abilty to ambualte and function   DOS 22   R TKA  Insurance/Certification information:   [facility] Medicare  Physician Information:   Dr. Cha Contreras  Has the plan of care been signed (Y/N):        []  Yes  [x]  No     Date of Patient follow up with Physician: 22      Is this a Progress Report:     []  Yes  [x]  No        If Yes:  Date Range for reporting period:  Beginning 22  Ending    Progress report will be due (10 Rx or 30 days whichever is less): 05/3/92       Recertification will be due (POC Duration  / 90 days whichever is less): 10/6/22         Visit # Insurance Allowable Auth Required   1 BMN [x]  Yes []  No        Functional Scale: FOTO 31; LEFS 17.7    Date assessed:  22      Latex Allergy:  [x]NO      []YES  Preferred Language for Healthcare:   [x]English       []other:    Pain level:  6/10     SUBJECTIVE:  See eval    OBJECTIVE: See eval  Observation:   Test measurements:      RESTRICTIONS/PRECAUTIONS: colon cancer; recent R TKA    Exercises/Interventions:   Exercise/Equipment Resistance/Repetitions Other comments   Cardio/Warm-up     Bike     Treadmill          Stretching     Hamstring Seated 10:\" x10    Hip Flexion     ITB     Grion     Quad     Inclined Calf     Towel Pull 10\"x10         ROM     Passive     Active Assisted at side of table 10\"x10    Weight Shift     Weight Hangs     Sheet Pulls     Ankle Pumps           Patellar Glides     Medial     Superior     Inferior          STRENGTH     SLR Supine 2x5 reps w/ assistance from 90 -45    Prone     Abduction     Adducton PS 10\"x10    SLR+          Isometrics     Quad sets          CKC     Calf raises     Wall sits     Step ups     1 leg stand     Squatting     CC TKE Blue TB 10\"x10    Balance          PRE     Extension  RANGE:   Flexion  RANGE:   Leg Press  RANGE:        Cable Column     Ed on POC, expectations and goals x10'         Manual/Modalities                 Therapeutic Exercise and NMR EXR  [x] (97181) Provided verbal/tactile cueing for activities related to strengthening, flexibility, endurance, ROM for improvements in LE, proximal hip, and core control with self care, mobility, lifting, ambulation. [x] (76052) Provided verbal/tactile cueing for activities related to improving balance, coordination, kinesthetic sense, posture, motor skill, proprioception  to assist with LE, proximal hip, and core control in self care, mobility, lifting, ambulation and eccentric single leg control. NMR and Therapeutic Activities:    [x] (10980 or 13776) Provided verbal/tactile cueing for activities related to improving balance, coordination, kinesthetic sense, posture, motor skill, proprioception and motor activation to allow for proper function of core, proximal hip and LE with self care and ADLs  [] (39675) Gait Re-education- Provided training and instruction to the patient for proper LE, core and proximal hip recruitment and positioning and eccentric body weight control with ambulation re-education including up and down stairs     Home Exercise Program:    [x] (25390) Reviewed/Progressed HEP activities related to strengthening, flexibility, endurance, ROM of core, proximal hip and LE for functional self-care, mobility, lifting and ambulation/stair navigation            Written HEP est    Access Code: [email or web address]  URL: [email or web address]  Date: 09/06/2022  Prepared by: Kim Junior    Exercises  Long Sitting Calf Stretch with Strap - 2 x daily - 7 x weekly - 1 sets - 10 reps - 10 hold  Seated Table Hamstring Stretch - 2 x daily - 7 x weekly - 1 sets - 10 reps - 10 hold  Long Sitting Quad Set - 2 x daily - 7 x weekly - 1 sets - 10 reps - 10 hold  Active Straight Leg Raise with Quad Set - 2 x daily - 7 x weekly - 4 sets - 5 reps  Supine Hip Adduction Isometric with Ball - 2 x daily - 7 x weekly - 1 sets - 10 reps - 10 hold  Standing Terminal Knee Extension with Resistance - 2 x daily - 7 x weekly - 1 sets - 10 reps - 10 hold  Seated Knee Flexion AAROM - 2 x daily - 7 x weekly - 1 sets - 10 reps - 10 hold      [] (05942)Reviewed/Progressed HEP activities related to improving balance, coordination, kinesthetic sense, posture, motor skill, proprioception of core, proximal hip and LE for self care, mobility, lifting, and ambulation/stair navigation      Manual Treatments:  PROM / STM / Oscillations-Mobs:  G-I, II, III, IV (PA's, Inf., Post.)  [] (20601) Provided manual therapy to mobilize LE, proximal hip and/or LS spine soft tissue/joints for the purpose of modulating pain, promoting relaxation,  increasing ROM, reducing/eliminating soft tissue swelling/inflammation/restriction, improving soft tissue extensibility and allowing for proper ROM for normal function with self care, mobility, lifting and ambulation. Modalities:  declined but will do at home   [] GAME READY (VASO)- for significant edema, swelling, pain control.      Charges:  Timed Code Treatment Minutes: 25   Total Treatment Minutes: 45     [x] EVAL (LOW) 16127 (typically 20 minutes face-to-face)  [] EVAL (MOD) 61544 (typically 30 minutes face-to-face)  [] EVAL (HIGH) 60841 (typically 45 minutes face-to-face)  [] RE-EVAL   [x] EI(31471) 2x25   [] IONTO  [] NMR (40382) x     [] VASO  [] Manual (43947) x      [] Other:  [] TA (64160) x      [] Mech Traction (54069)  [] ES(attended) (73200)      [] ES (un) (24328):       GOALS:  Patient stated goal: full function of R knee to return to work  [] Progressing: [] Met: [] Not Met: [] Adjusted     Therapist goals for Patient:   Short Term Goals: To be achieved in: 2 weeks  1. Independent in HEP and progression per patient tolerance, in order to prevent re-injury. [] Progressing: [] Met: [] Not Met: [] Adjusted  2. Patient will have a decrease in pain to facilitate improvement in movement, function, and ADLs as indicated by Functional Deficits. [] Progressing: [] Met: [] Not Met: [] Adjusted     Long Term Goals: To be achieved in: 12 weeks  1. Functional index score of 61 or more for FOTO to assist with reaching prior level of function. [] Progressing: [] Met: [] Not Met: [] Adjusted  2. Patient will demonstrate increased AROM to 0-125 deg to allow for proper joint functioning as indicated by patients Functional Deficits. [] Progressing: [] Met: [] Not Met: [] Adjusted  3. Patient will demonstrate an increase in Strength to good proximal hip strength and control, within 5lb HHD in LE to allow for proper functional mobility as indicated by patients Functional Deficits. [] Progressing: [] Met: [] Not Met: [] Adjusted  4. Patient will return to 90% functional activities without increased symptoms or restriction. [] Progressing: [] Met: [] Not Met: [] Adjusted  5. Pt will be able to return to work w/ min to no limitations standing 8 hr/day. (patient specific functional goal)    [] Progressing: [] Met: [] Not Met: [] Adjusted    Overall Progression Towards Functional goals/ Treatment Progress Update:  [] Patient is progressing as expected towards functional goals listed. [] Progression is slowed due to complexities/Impairments listed. [] Progression has been slowed due to co-morbidities.   [x] Plan just implemented, too soon to assess goals progression <30days   [] Goals require adjustment due to lack of progress  [] Patient is not progressing as expected and requires additional follow up with physician  [] Other    ASSESSMENT:  See eval    Treatment/Activity Tolerance:  [] Patient tolerated treatment well [] Patient limited by fatique  [] Patient limited by pain  [] Patient limited by other medical complications  [] Other:     Prognosis: [x] Good [] Fair  [] Poor    Patient Requires Follow-up: [x] Yes  [] No    PLAN: See eval  [x] Continue per plan of care [] Alter current plan (see comments)  [x] Plan of care initiated [] Hold pending MD visit [] Discharge    Electronically signed by: Griselda Chand PT, MS, OMT-C    Physical Therapist [place] license #[id number]  Physical Therapist [place] license #[id number]    Note: If patient does not return for scheduled/ recommended follow up visits, this note will serve as a discharge from care along with most recent update on progress.

## 2022-09-07 DIAGNOSIS — Z96.651 STATUS POST TOTAL RIGHT KNEE REPLACEMENT: Primary | ICD-10-CM

## 2022-09-07 PROBLEM — Z01.818 PRE-OP EVALUATION: Status: RESOLVED | Noted: 2022-08-08 | Resolved: 2022-09-07

## 2022-09-08 ENCOUNTER — HOSPITAL ENCOUNTER (OUTPATIENT)
Dept: PHYSICAL THERAPY | Age: 72
Setting detail: THERAPIES SERIES
Discharge: HOME | End: 2022-09-08
Payer: MEDICARE

## 2022-09-08 PROCEDURE — 97110 THERAPEUTIC EXERCISES: CPT | Performed by: PHYSICAL THERAPIST

## 2022-09-08 PROCEDURE — 97112 NEUROMUSCULAR REEDUCATION: CPT | Performed by: PHYSICAL THERAPIST

## 2022-09-08 NOTE — FLOWSHEET NOTE
[facility] [address] [place], [place] [address] [address]  Phone: [phone]   Fax:     [phone]    Physical Therapy Treatment Note/ Progress Report:     Date:  2022  Patient Name:  Chloe Eric    :  1950  MRN: [id number]  Restrictions/Precautions:  hx of cancer; recent surg  Medical/Treatment Diagnosis Information:  Diagnosis: M17.11 (ICD-10-CM) - Primary osteoarthritis of right knee   Treatment Diagnosis: decreased abilty to ambualte and function   DOS 22   R TKA  Insurance/Certification information:   [facility] Medicare  Physician Information:   Dr. Guillermo Stephen  Has the plan of care been signed (Y/N):        []  Yes  [x]  No     Date of Patient follow up with Physician: 22      Is this a Progress Report:     []  Yes  [x]  No        If Yes:  Date Range for reporting period:  Beginning 22  Ending    Progress report will be due (10 Rx or 30 days whichever is less):        Recertification will be due (POC Duration  / 90 days whichever is less): 10/6/22         Visit # Insurance Allowable Auth Required   2 BMN [x]  Yes []  No        Functional Scale: FOTO 31; LEFS 17.7    Date assessed:  22      Latex Allergy:  [x]NO      []YES  Preferred Language for Healthcare:   [x]English       []other:    Pain level:  6/10     SUBJECTIVE:  3 weeks s/p surg  Pt less stiff on arrival today. She did take pain medication prior to arrival to help w/ therapy today. OBJECTIVE: See eval  Observation: incision healing well w/ prineo removed and adhesions noted but complete closure;  Girth measurements: L 43 cm @ joint line; 45 cm @ superior patella; R 47 cm @ joint line; 48 cm @ superior patella;      Palpation: tender around knee joint generally into lower leg;     Functional Mobility/Transfers: ind but slow and cautious w/ R LE for transfers;     Posture: ant tilt , increased lumbar lordosis, genu valgus noted     Gait- ambulates with er of right LE, min limp noted using SPC, decreased stance phase and shortened stride length;              PROM AROM     L R L R   Hip Flexion 110 100 100 100   Knee Flexion 120 120 120 100 @ on floor  (110 pre surg)   Knee Extension 0 -15 0 -0 (-20 pre surg)         9/6/22 pre surg measurements  Strength (0-5) Left Right   Hip Flexion - seated 5 4   Hip Abduction - sidelyling 5 4   Hip Extension 5 4   Quads 27# Poor (17#)   Hamstrings 5 4   Ankle DF 5 4   Ankle PF 5 4           Flexibility       Hamstrings (90/90) -10 -15   ITB (Amicol) tight tight   Quads (Ely's) tighit 90   Hip Flexor (Jerry) tight tight           Girth       Mid patella 44 45   Suprapatellar 48 49      Test measurements:      RESTRICTIONS/PRECAUTIONS: colon cancer; recent R TKA    Exercises/Interventions:   Exercise/Equipment Resistance/Repetitions Other comments   Cardio/Warm-up     Bike     Treadmill     NuStep X5 min warm up    Stretching     Hamstring Seated 10:\" x10    Hip Flexion     ITB     Grion     Quad     Inclined Calf     Towel Pull 10\"x10         ROM     Passive     Active Assisted at side of table 10\"x10 w/ foot on floor and sliding on board w/ PT assistance    Weight Shift     Weight Hangs     Sheet Pulls     Ankle Pumps           Patellar Glides     Medial     Superior     Inferior          STRENGTH     SLR     Supine 3x10 over foam roll    Prone     Abduction X5 side lying    Standing abd 3x10 + wt NPV   Adducton PS 10\"x10+ glut    SLR+          Isometrics     Quad sets          CKC     Calf raises x30    Wall sits     Step ups     1 leg stand     Squatting     CC TKE Blue TB 10\"x10    Balance Tandem 30\"x4 eo         PRE     Extension  RANGE:   Flexion  RANGE:   Leg Press  RANGE:        Cable Column     Ed on POC, expectations and goals X10'; 9/8/22 reviewed         Manual/Modalities                 Therapeutic Exercise and NMR EXR  [x] (19789) Provided verbal/tactile cueing for activities related to strengthening, flexibility, endurance, ROM for improvements in LE, proximal hip, and core control with self care, mobility, lifting, ambulation. [x] (05022) Provided verbal/tactile cueing for activities related to improving balance, coordination, kinesthetic sense, posture, motor skill, proprioception  to assist with LE, proximal hip, and core control in self care, mobility, lifting, ambulation and eccentric single leg control. NMR and Therapeutic Activities:    [x] (80222 or 43805) Provided verbal/tactile cueing for activities related to improving balance, coordination, kinesthetic sense, posture, motor skill, proprioception and motor activation to allow for proper function of core, proximal hip and LE with self care and ADLs  [] (37595) Gait Re-education- Provided training and instruction to the patient for proper LE, core and proximal hip recruitment and positioning and eccentric body weight control with ambulation re-education including up and down stairs     Home Exercise Program:    [x] (42774) Reviewed/Progressed HEP activities related to strengthening, flexibility, endurance, ROM of core, proximal hip and LE for functional self-care, mobility, lifting and ambulation/stair navigation            Written HEP est    Access Code: [email or web address]  URL: [email or web address]  Date: 09/06/2022  Prepared by: Alethea Beard    Exercises  Long Sitting Calf Stretch with Strap - 2 x daily - 7 x weekly - 1 sets - 10 reps - 10 hold  Seated Table Hamstring Stretch - 2 x daily - 7 x weekly - 1 sets - 10 reps - 10 hold  Long Sitting Quad Set - 2 x daily - 7 x weekly - 1 sets - 10 reps - 10 hold  Active Straight Leg Raise with Quad Set - 2 x daily - 7 x weekly - 4 sets - 5 reps  Supine Hip Adduction Isometric with Ball - 2 x daily - 7 x weekly - 1 sets - 10 reps - 10 hold  Standing Terminal Knee Extension with Resistance - 2 x daily - 7 x weekly - 1 sets - 10 reps - 10 hold  Seated Knee Flexion AAROM - 2 x daily - 7 x weekly - 1 sets - 10 reps - 10 hold      [] (95612)Reviewed/Progressed HEP activities related to improving balance, coordination, kinesthetic sense, posture, motor skill, proprioception of core, proximal hip and LE for self care, mobility, lifting, and ambulation/stair navigation      Manual Treatments:  PROM / STM / Oscillations-Mobs:  G-I, II, III, IV (PA's, Inf., Post.)  [] (24111) Provided manual therapy to mobilize LE, proximal hip and/or LS spine soft tissue/joints for the purpose of modulating pain, promoting relaxation,  increasing ROM, reducing/eliminating soft tissue swelling/inflammation/restriction, improving soft tissue extensibility and allowing for proper ROM for normal function with self care, mobility, lifting and ambulation. Modalities:  declined but will do at home   [] GAME READY (VASO)- for significant edema, swelling, pain control. Charges:  Timed Code Treatment Minutes: 45   Total Treatment Minutes: 45     [] EVAL (LOW) 09017 (typically 20 minutes face-to-face)  [] EVAL (MOD) 55849 (typically 30 minutes face-to-face)  [] EVAL (HIGH) 36637 (typically 45 minutes face-to-face)  [] RE-EVAL   [x] YE(13931) 2x30   [] IONTO  [x] NMR (37564) 1x10     [] VASO  [] Manual (79167) x      [x] Other:GT using SPC  [] TA (15780) x      [] Mech Traction (91376)  [] ES(attended) (83130)      [] ES (un) (62470):       GOALS:  Patient stated goal: full function of R knee to return to work  [] Progressing: [] Met: [] Not Met: [] Adjusted     Therapist goals for Patient:   Short Term Goals: To be achieved in: 2 weeks  1. Independent in HEP and progression per patient tolerance, in order to prevent re-injury. [] Progressing: [] Met: [] Not Met: [] Adjusted  2. Patient will have a decrease in pain to facilitate improvement in movement, function, and ADLs as indicated by Functional Deficits. [] Progressing: [] Met: [] Not Met: [] Adjusted     Long Term Goals: To be achieved in: 12 weeks  1.  Functional index score of 61 or more for FOTO to assist with eval  [x] Continue per plan of care [] Alter current plan (see comments)  [x] Plan of care initiated [] Hold pending MD visit [] Discharge    Electronically signed by: Jeri Watt PT, MS, OMT-C    Physical Therapist [place] license #[id number]  Physical Therapist [place] license #[id number]    Note: If patient does not return for scheduled/ recommended follow up visits, this note will serve as a discharge from care along with most recent update on progress.

## 2022-09-09 DIAGNOSIS — E78.2 MIXED HYPERLIPIDEMIA: ICD-10-CM

## 2022-09-09 RX ORDER — ROSUVASTATIN CALCIUM 20 MG/1
TABLET, COATED ORAL
Qty: 90 TABLET | Refills: 0 | Status: SHIPPED | OUTPATIENT
Start: 2022-09-09

## 2022-09-09 NOTE — TELEPHONE ENCOUNTER
Future Appointments   Date Time Provider Eder Faith   9/12/2022  2:20 PM Jenny Domingo MD [facility] [place] - DYD   9/13/2022 10:15 AM SCHEDULE, [facility] OP PT THERAPIST [facility] OP PT [facility]   9/15/2022 10:15 AM SCHEDULE, [facility] OP PT THERAPIST [facility] PT [facility]   9/20/2022 10:15 AM SCHEDULE, [facility] OP PT THERAPIST [facility] PT [facility]   9/22/2022 10:15 AM SCHEDULE, [facility] OP PT THERAPIST [facility] PT [facility]   9/26/2022 10:00 AM Arnol Soriano MD [facility]   9/27/2022 10:15 AM SCHEDULE, [facility] OP PT THERAPIST [facility] OP PT [facility]   9/29/2022 10:15 AM SCHEDULE, [facility] OP PT THERAPIST [facility] PT [facility]   10/4/2022 10:15 AM SCHEDULE, [facility] OP PT THERAPIST [facility] PT [facility]   10/6/2022 10:15 AM SCHEDULE, [facility] OP PT THERAPIST [facility] PT [facility]     Last appt on 6.14.2022

## 2022-09-10 NOTE — PROGRESS NOTES
This dictation was done with [employer] dictation and may contain mechanical errors related to translation. Resp. rate 16, height 5' 6.5\" (1.689 m), weight 198 lb (89.8 kg), not currently breastfeeding. Is a very pleasant 59-year-old female who is here in follow-up after right total knee replacement on 8/17/2022. Currently incisions healing nicely no signs of infection. She was sent for x-rays including an AP lateral and a sunrise view of her right knee. Xray three views of the total knee arthroplasty reveals satisfactory alignment of the prosthesis . No signs of significant polyethylene wear or failure. No progressive radiolucencies,fractures, tumors or dislocations. She has good range of motion of 0 to 95 degrees no varus or valgus laxity no neurologic deficits to the right foot. We talked about transitioning to outpatient physical therapy and the use of pain pills behavior modification and she will follow-up with us in 4 weeks her ultimate goal for her is to get her to 0 120 degrees and no pain.   Currently she is at 6 out of 10

## 2022-09-12 ENCOUNTER — OFFICE VISIT (OUTPATIENT)
Dept: INTERNAL MEDICINE CLINIC | Age: 72
End: 2022-09-12
Payer: MEDICARE

## 2022-09-12 VITALS
WEIGHT: 187.2 LBS | BODY MASS INDEX: 29.38 KG/M2 | HEART RATE: 87 BPM | SYSTOLIC BLOOD PRESSURE: 100 MMHG | OXYGEN SATURATION: 100 % | TEMPERATURE: 97.6 F | DIASTOLIC BLOOD PRESSURE: 60 MMHG | HEIGHT: 67 IN | RESPIRATION RATE: 18 BRPM

## 2022-09-12 DIAGNOSIS — M79.89 RIGHT LEG SWELLING: Primary | ICD-10-CM

## 2022-09-12 DIAGNOSIS — R79.89 POSITIVE D-DIMER: ICD-10-CM

## 2022-09-12 LAB
ALBUMIN SERPL-MCNC: 4.2 G/DL (ref 3.4–5)
ANION GAP SERPL CALCULATED.3IONS-SCNC: 16 MMOL/L (ref 3–16)
BASOPHILS ABSOLUTE: 0 K/UL (ref 0–0.2)
BASOPHILS RELATIVE PERCENT: 0.9 %
BUN BLDV-MCNC: 10 MG/DL (ref 7–20)
CALCIUM SERPL-MCNC: 9.9 MG/DL (ref 8.3–10.6)
CHLORIDE BLD-SCNC: 98 MMOL/L (ref 99–110)
CO2: 27 MMOL/L (ref 21–32)
CREAT SERPL-MCNC: 0.7 MG/DL (ref 0.6–1.2)
D DIMER: 3.88 UG/ML FEU (ref 0–0.6)
EOSINOPHILS ABSOLUTE: 0.1 K/UL (ref 0–0.6)
EOSINOPHILS RELATIVE PERCENT: 1.3 %
GFR AFRICAN AMERICAN: >60
GFR NON-AFRICAN AMERICAN: >60
GLUCOSE BLD-MCNC: 105 MG/DL (ref 70–99)
HCT VFR BLD CALC: 35.2 % (ref 36–48)
HEMOGLOBIN: 12.1 G/DL (ref 12–16)
LYMPHOCYTES ABSOLUTE: 0.9 K/UL (ref 1–5.1)
LYMPHOCYTES RELATIVE PERCENT: 21.8 %
MCH RBC QN AUTO: 32.1 PG (ref 26–34)
MCHC RBC AUTO-ENTMCNC: 34.3 G/DL (ref 31–36)
MCV RBC AUTO: 93.5 FL (ref 80–100)
MONOCYTES ABSOLUTE: 0.3 K/UL (ref 0–1.3)
MONOCYTES RELATIVE PERCENT: 7.1 %
NEUTROPHILS ABSOLUTE: 3 K/UL (ref 1.7–7.7)
NEUTROPHILS RELATIVE PERCENT: 68.9 %
PDW BLD-RTO: 13 % (ref 12.4–15.4)
PHOSPHORUS: 3.7 MG/DL (ref 2.5–4.9)
PLATELET # BLD: 401 K/UL (ref 135–450)
PMV BLD AUTO: 7.4 FL (ref 5–10.5)
POTASSIUM SERPL-SCNC: 4.2 MMOL/L (ref 3.5–5.1)
RBC # BLD: 3.77 M/UL (ref 4–5.2)
SODIUM BLD-SCNC: 141 MMOL/L (ref 136–145)
WBC # BLD: 4.3 K/UL (ref 4–11)

## 2022-09-12 PROCEDURE — 1090F PRES/ABSN URINE INCON ASSESS: CPT | Performed by: INTERNAL MEDICINE

## 2022-09-12 PROCEDURE — G8399 PT W/DXA RESULTS DOCUMENT: HCPCS | Performed by: INTERNAL MEDICINE

## 2022-09-12 PROCEDURE — 99213 OFFICE O/P EST LOW 20 MIN: CPT | Performed by: INTERNAL MEDICINE

## 2022-09-12 PROCEDURE — 1123F ACP DISCUSS/DSCN MKR DOCD: CPT | Performed by: INTERNAL MEDICINE

## 2022-09-12 PROCEDURE — 3017F COLORECTAL CA SCREEN DOC REV: CPT | Performed by: INTERNAL MEDICINE

## 2022-09-12 PROCEDURE — 36415 COLL VENOUS BLD VENIPUNCTURE: CPT | Performed by: INTERNAL MEDICINE

## 2022-09-12 PROCEDURE — G8417 CALC BMI ABV UP PARAM F/U: HCPCS | Performed by: INTERNAL MEDICINE

## 2022-09-12 PROCEDURE — G8427 DOCREV CUR MEDS BY ELIG CLIN: HCPCS | Performed by: INTERNAL MEDICINE

## 2022-09-12 PROCEDURE — 1036F TOBACCO NON-USER: CPT | Performed by: INTERNAL MEDICINE

## 2022-09-12 ASSESSMENT — PATIENT HEALTH QUESTIONNAIRE - PHQ9
1. LITTLE INTEREST OR PLEASURE IN DOING THINGS: 0
SUM OF ALL RESPONSES TO PHQ QUESTIONS 1-9: 0
SUM OF ALL RESPONSES TO PHQ QUESTIONS 1-9: 0
2. FEELING DOWN, DEPRESSED OR HOPELESS: 0
SUM OF ALL RESPONSES TO PHQ QUESTIONS 1-9: 0
SUM OF ALL RESPONSES TO PHQ9 QUESTIONS 1 & 2: 0
SUM OF ALL RESPONSES TO PHQ QUESTIONS 1-9: 0

## 2022-09-12 ASSESSMENT — ENCOUNTER SYMPTOMS
SHORTNESS OF BREATH: 0
CHEST TIGHTNESS: 0
ABDOMINAL PAIN: 0
COUGH: 0
CHOKING: 0
WHEEZING: 0
APNEA: 0
EYES NEGATIVE: 1
ALLERGIC/IMMUNOLOGIC NEGATIVE: 1
RESPIRATORY NEGATIVE: 1
BACK PAIN: 1

## 2022-09-12 NOTE — PROGRESS NOTES
Jaylyn Houston (:  1950) is a 66 y.o. female,Established patient, here for evaluation of the following chief complaint(s):  Follow-up (4 week , no concerns)         ASSESSMENT/PLAN:  1. Right leg swelling: will rule out DVT  -     CBC with Auto Differential; Future  -     D-Dimer, Quantitative; Future  -     Renal Function Panel; Future  -     VL DUP LOWER EXTREMITY VENOUS RIGHT; Future  -     apixaban (ELIQUIS) 5 MG TABS tablet; Take 2 tablets by mouth 2 times daily for 7 days No NSAID's., Disp-28 tablet, R-0Normal  2. Positive D-dimer  ( addendum ) positive,   -     apixaban (ELIQUIS) 5 MG TABS tablet; Take 2 tablets by mouth 2 times daily for 7 days No NSAID's., Disp-28 tablet, R-0Normal    No follow-ups on file. Follow up after tests doppler in am      Subjective   SUBJECTIVE/OBJECTIVE:  Knee Pain   Incident onset: right TKR on 22 and past two days increast right knee swelling and pain. There was no injury mechanism (TKR 22). The pain is present in the right knee and right leg. The quality of the pain is described as aching. The pain is at a severity of 5/10. The pain is moderate. The pain has been Constant since onset. Pertinent negatives include no loss of motion, loss of sensation, muscle weakness or numbness. She reports no foreign bodies present. The symptoms are aggravated by palpation and movement. She has tried ice for the symptoms. The treatment provided mild relief. Review of Systems   Constitutional: Negative. Negative for fever. HENT: Negative. Eyes: Negative. Respiratory: Negative. Negative for apnea, cough, choking, chest tightness, shortness of breath and wheezing. Cardiovascular:  Positive for leg swelling. Negative for chest pain and palpitations. Hypertension mother with coronary artery bypass surgery and carotid endarterectomy history   Gastrointestinal:  Negative for abdominal pain.         Colon cancer of age 47 and last colonoscopy  and due in 2026   Endocrine:        History of type 2 diabetes in her brother and hyperlipidemia   Genitourinary:  Negative for dysuria and pelvic pain. No Pap smears until age 71 and told does not need any more   Musculoskeletal:  Positive for back pain. Negative for myalgias and neck pain. Moderate to severe lumbar spinal stenosis at multiple levels and sciatica of left leg    Advanced osteoarthritis of right knee with chronic swelling and deformity and awaiting knee replacement, history of bursitis left hip   Allergic/Immunologic: Negative. Neurological:  Negative for weakness, numbness and headaches. Sciatica of left leg   Hematological: Negative. Psychiatric/Behavioral: Negative. Objective   Physical Exam  Constitutional:       Appearance: Normal appearance. She is normal weight. HENT:      Head: Normocephalic and atraumatic. Pulmonary:      Effort: Pulmonary effort is normal.      Breath sounds: Normal breath sounds. Musculoskeletal:         General: Swelling present. No deformity. Cervical back: Normal range of motion and neck supple. Comments: Swelling of right knee and lower leg and warm. No chino but pain behind the knee with palpation. Skin:     General: Skin is warm. Findings: No rash. Neurological:      General: No focal deficit present. Mental Status: She is alert. Psychiatric:         Mood and Affect: Mood normal.         Behavior: Behavior normal.                An electronic signature was used to authenticate this note.     --Panda Worthington MD

## 2022-09-13 ENCOUNTER — TELEPHONE (OUTPATIENT)
Dept: ORTHOPEDIC SURGERY | Age: 72
End: 2022-09-13

## 2022-09-13 ENCOUNTER — HOSPITAL ENCOUNTER (OUTPATIENT)
Dept: VASCULAR LAB | Age: 72
Discharge: HOME | End: 2022-09-13
Payer: MEDICARE

## 2022-09-13 ENCOUNTER — HOSPITAL ENCOUNTER (OUTPATIENT)
Dept: PHYSICAL THERAPY | Age: 72
Setting detail: THERAPIES SERIES
Discharge: HOME | End: 2022-09-13
Payer: MEDICARE

## 2022-09-13 ENCOUNTER — TELEPHONE (OUTPATIENT)
Dept: INTERNAL MEDICINE CLINIC | Age: 72
End: 2022-09-13

## 2022-09-13 DIAGNOSIS — M79.89 RIGHT LEG SWELLING: ICD-10-CM

## 2022-09-13 PROCEDURE — 93971 EXTREMITY STUDY: CPT

## 2022-09-13 NOTE — TELEPHONE ENCOUNTER
I called the patient and left a message.   She needs to make an appointment to be seen on Thursday of this week

## 2022-09-13 NOTE — TELEPHONE ENCOUNTER
Dr. Nemesio Tavarez is needing to speak to Dr. Franklyn Chappell about this patient who has a high d-dimer. Please call the doctor at [phone]. Please call.

## 2022-09-13 NOTE — TELEPHONE ENCOUNTER
High d-dimer. Right lower leg and knee swollen. Normal cbc and renal.  Concern for dvt. Doppler negative . Will continue eliquis 10 mg bid until sees Dr. Alessandro Cruz in 2 days. Discussed with DR. Alessandro Cruz.

## 2022-09-13 NOTE — FLOWSHEET NOTE
[facility]   Phone: [phone]    Fax: [phone]            Physical Therapy  Cancellation/No-show Note  Patient Name:  Anjali Quintero  :  1950   Date:  2022  Cancelled visits to date: 1  No-shows to date: 0    For today's appointment patient:  [x]  Cancelled  []  Rescheduled appointment  []  No-show     Reason given by patient:  []  Patient ill  []  Conflicting appointment  []  No transportation    []  Conflict with work  []  No reason given  [x]  Other:  Pt had a test today and was unable to attend therapy. She is scheduled for Thursday and plans to keep that appt. Comments:      Phone call information:   []  Phone call made today to patient at _ time at number provided:      []  Patient answered, conversation as follows:    []  Patient did not answer, message left as follows:  []  Phone call not made today  []  Phone call not needed - pt contacted us to cancel and provided reason for cancellation.      Electronically signed by:  Carol Keenan PT, Emily Sandra 86, OMT-C    Physical Therapist [place] license #[id number]  Physical Therapist [place] license #[id number]

## 2022-09-15 ENCOUNTER — OFFICE VISIT (OUTPATIENT)
Dept: ORTHOPEDIC SURGERY | Age: 72
End: 2022-09-15

## 2022-09-15 ENCOUNTER — HOSPITAL ENCOUNTER (OUTPATIENT)
Dept: PHYSICAL THERAPY | Age: 72
Setting detail: THERAPIES SERIES
Discharge: HOME | End: 2022-09-15
Payer: MEDICARE

## 2022-09-15 ENCOUNTER — TELEPHONE (OUTPATIENT)
Dept: ADMINISTRATIVE | Age: 72
End: 2022-09-15

## 2022-09-15 VITALS — RESPIRATION RATE: 16 BRPM | BODY MASS INDEX: 29.35 KG/M2 | HEIGHT: 67 IN | WEIGHT: 187 LBS

## 2022-09-15 DIAGNOSIS — Z96.651 STATUS POST TOTAL RIGHT KNEE REPLACEMENT: Primary | ICD-10-CM

## 2022-09-15 LAB
APPEARANCE FLUID: NORMAL
CELL COUNT FLUID TYPE: NORMAL
CLOT EVALUATION: NORMAL
COLOR FLUID: YELLOW
LYMPHOCYTES, BODY FLUID: 51 %
MACROPHAGE FLUID: 33 %
NEUTROPHIL, FLUID: 16 %
NUCLEATED CELLS FLUID: 399 /CUMM
NUMBER OF CELLS COUNTED FLUID: 100
RBC FLUID: 5000 /CUMM

## 2022-09-15 PROCEDURE — 97110 THERAPEUTIC EXERCISES: CPT | Performed by: PHYSICAL THERAPIST

## 2022-09-15 PROCEDURE — 99024 POSTOP FOLLOW-UP VISIT: CPT | Performed by: ORTHOPAEDIC SURGERY

## 2022-09-15 PROCEDURE — 97112 NEUROMUSCULAR REEDUCATION: CPT | Performed by: PHYSICAL THERAPIST

## 2022-09-15 RX ORDER — TIZANIDINE 4 MG/1
4 TABLET ORAL 3 TIMES DAILY
Qty: 90 TABLET | Refills: 0 | Status: SHIPPED | OUTPATIENT
Start: 2022-09-15

## 2022-09-15 NOTE — FLOWSHEET NOTE
[facility] [address]  Phone: [phone]   Fax:     [phone]    Physical Therapy Treatment Note/ Progress Report:     Date:  9/15/2022  Patient Name:  Kathleen Brown    :  1950  MRN: [id number]  Restrictions/Precautions:  hx of cancer; recent surg  Medical/Treatment Diagnosis Information:  Diagnosis: M17.11 (ICD-10-CM) - Primary osteoarthritis of right knee   Treatment Diagnosis: decreased abilty to ambualte and function   DOS 22   R TKA  Insurance/Certification information:   [facility] [place]  Physician Information:   Dr. German Oquendo  Has the plan of care been signed (Y/N):        []  Yes  [x]  No     Date of Patient follow up with Physician: 22      Is this a Progress Report:     []  Yes  [x]  No        If Yes:  Date Range for reporting period:  Beginning 22  Ending    Progress report will be due (10 Rx or 30 days whichever is less):        Recertification will be due (POC Duration  / 90 days whichever is less): 10/6/22         Visit # Insurance Allowable Auth Required   3 BMN [x]  Yes []  No        Functional Scale: FOTO 31; LEFS 17.7    Date assessed:  22      Latex Allergy:  [x]NO      []YES  Preferred Language for Healthcare:   [x]English       []other:    Pain level:  6/10     SUBJECTIVE:  4 weeks s/p surg  Pt feels more stiff today. She had more swelling on Monday and was at primary care MD. Primary Care MD ordered dopper which was performed on Tuesday and came back neg. She had MD appt w/ Dr. German Oquendo this AM w/ fluid removed from knee and will be examined. She was given a muscle relaxer to help w/ pain. OBJECTIVE: See eval  Observation: incision healing well but adhesions noted ;  Girth measurements: L 43 cm @ joint line; 45 cm @ superior patella; R 47 cm @ joint line; 48 cm @ superior patella;   9/15/22 R 46 cm @ joint line; 48 cm @ superior patella;     Palpation: tender around knee joint generally into lower leg;     Functional Mobility/Transfers: ind but slow and cautious w/ R LE for transfers;     Posture: ant tilt , increased lumbar lordosis, genu valgus noted     Gait- ambulates with er of right LE, min limp noted using SPC, decreased stance phase and shortened stride length;              PROM AROM     L R L R   Hip Flexion 110 100 100 100   Knee Flexion 120 120 120 105 @ on floor  (110 pre surg)   Knee Extension 0 -15 0 -0 (-20 pre surg)         9/6/22 pre surg measurements  Strength (0-5) Left Right   Hip Flexion - seated 5 4   Hip Abduction - sidelyling 5 4   Hip Extension 5 4   Quads 27# Poor (17#)   Hamstrings 5 4   Ankle DF 5 4   Ankle PF 5 4           Flexibility       Hamstrings (90/90) -10 -15   ITB (Maicol) tight tight   Quads (Ely's) tighit 90   Hip Flexor (Jerry) tight tight           Girth       Mid patella 44 45   Suprapatellar 48 49      Test measurements:      RESTRICTIONS/PRECAUTIONS: colon cancer; recent R TKA    Exercises/Interventions:   Exercise/Equipment Resistance/Repetitions Other comments   Cardio/Warm-up     Bike     Treadmill     NuStep X5 min warm up    Stretching     Hamstring Seated 10:\" x10    Hip Flexion     ITB     Grion     Quad     Inclined Calf     Towel Pull 10\"x10         ROM     Passive     Active Assisted at side of table 10\"x10 w/ foot on floor and sliding on board w/ PT assistance     Rolling on SB using strap 10\"x10    Weight Shift     Weight Hangs     Sheet Pulls     Ankle Pumps           Patellar Glides     Medial     Superior     Inferior          STRENGTH     SLR     Supine 3x10 over foam roll    Prone     Abduction    Standing abd 3x10 + wt NPV   Adducton PS 10\"x10+ glut    SLR+          Isometrics     Quad sets 10\" x10 over foam roll         CKC     Calf raises x30    Wall sits     Step ups     1 leg stand     Squatting     CC TKE Blue TB 10\"x10    Balance Tandem 30\"x4 eo         PRE     Extension  RANGE:   Flexion  RANGE:   Leg Press  RANGE: Cable Column     Ed on POC, expectations and goals X10'; 9/8/22 reviewed         Manual/Modalities     Fluid movement and scar massage x10'           Therapeutic Exercise and NMR EXR  [x] (85615) Provided verbal/tactile cueing for activities related to strengthening, flexibility, endurance, ROM for improvements in LE, proximal hip, and core control with self care, mobility, lifting, ambulation. [x] (91603) Provided verbal/tactile cueing for activities related to improving balance, coordination, kinesthetic sense, posture, motor skill, proprioception  to assist with LE, proximal hip, and core control in self care, mobility, lifting, ambulation and eccentric single leg control. NMR and Therapeutic Activities:    [x] (23670 or 53411) Provided verbal/tactile cueing for activities related to improving balance, coordination, kinesthetic sense, posture, motor skill, proprioception and motor activation to allow for proper function of core, proximal hip and LE with self care and ADLs  [] (36932) Gait Re-education- Provided training and instruction to the patient for proper LE, core and proximal hip recruitment and positioning and eccentric body weight control with ambulation re-education including up and down stairs     Home Exercise Program:    [x] (69599) Reviewed/Progressed HEP activities related to strengthening, flexibility, endurance, ROM of core, proximal hip and LE for functional self-care, mobility, lifting and ambulation/stair navigation            Written HEP est    Access Code: [email or web address]  URL: [email or web address]  Date: 09/06/2022  Prepared by: Herman Hodge    Exercises  Long Sitting Calf Stretch with Strap - 2 x daily - 7 x weekly - 1 sets - 10 reps - 10 hold  Seated Table Hamstring Stretch - 2 x daily - 7 x weekly - 1 sets - 10 reps - 10 hold  Long Sitting Quad Set - 2 x daily - 7 x weekly - 1 sets - 10 reps - 10 hold  Active Straight Leg Raise with Quad Set - 2 x daily - 7 x weekly - 4 sets - 5 reps  Supine Hip Adduction Isometric with Ball - 2 x daily - 7 x weekly - 1 sets - 10 reps - 10 hold  Standing Terminal Knee Extension with Resistance - 2 x daily - 7 x weekly - 1 sets - 10 reps - 10 hold  Seated Knee Flexion AAROM - 2 x daily - 7 x weekly - 1 sets - 10 reps - 10 hold      [] (73159)Reviewed/Progressed HEP activities related to improving balance, coordination, kinesthetic sense, posture, motor skill, proprioception of core, proximal hip and LE for self care, mobility, lifting, and ambulation/stair navigation      Manual Treatments:  PROM / STM / Oscillations-Mobs:  G-I, II, III, IV (PA's, Inf., Post.)  [] (40651) Provided manual therapy to mobilize LE, proximal hip and/or LS spine soft tissue/joints for the purpose of modulating pain, promoting relaxation,  increasing ROM, reducing/eliminating soft tissue swelling/inflammation/restriction, improving soft tissue extensibility and allowing for proper ROM for normal function with self care, mobility, lifting and ambulation. Modalities:  declined but will do at home   [] GAME READY (VASO)- for significant edema, swelling, pain control. Charges:  Timed Code Treatment Minutes: 45   Total Treatment Minutes: 45     [] EVAL (LOW) 83899 (typically 20 minutes face-to-face)  [] EVAL (MOD) 87643 (typically 30 minutes face-to-face)  [] EVAL (HIGH) 19991 (typically 45 minutes face-to-face)  [] RE-EVAL   [x] PS(71563) 2x30   [] IONTO  [x] NMR (52117) 1x10     [] VASO  [] Manual (70757) x      [x] Other:GT using SPC  [] TA (26610) x      [] Mech Traction (56779)  [] ES(attended) (56967)      [] ES (un) (12935):       GOALS:  Patient stated goal: full function of R knee to return to work  [] Progressing: [] Met: [] Not Met: [] Adjusted     Therapist goals for Patient:   Short Term Goals: To be achieved in: 2 weeks  1. Independent in HEP and progression per patient tolerance, in order to prevent re-injury.    [] Progressing: [] Met: [] Not Met: [] improve ROM. Treatment/Activity Tolerance:  [] Patient tolerated treatment well [] Patient limited by fatique  [x] Patient limited by pain  [] Patient limited by other medical complications  [] Other:     Prognosis: [x] Good [] Fair  [] Poor    Patient Requires Follow-up: [x] Yes  [] No    PLAN: See eval  [x] Continue per plan of care [] Alter current plan (see comments)  [x] Plan of care initiated [] Hold pending MD visit [] Discharge    Electronically signed by: Arabella Stevens, PT, MS, OMT-C    Physical Therapist [place] license #[id number]  Physical Therapist [place] license #[id number]    Note: If patient does not return for scheduled/ recommended follow up visits, this note will serve as a discharge from care along with most recent update on progress.

## 2022-09-15 NOTE — TELEPHONE ENCOUNTER
Submitted PA for Eliquis  Via CMM Key: [id number] STATUS: \"Available without authorization. \"    If this requires a response please respond to the pool. [address]). Please advise patient thank you.

## 2022-09-16 NOTE — PROGRESS NOTES
[facility]  Office Visit    Chief Complaint: Follow-up s/p right total knee arthroplasty    HPI:  Kathleen Brown is a 66 y.o. who is here in follow-up of right total knee arthroplasty performed on August 17, 2022. She comes in today earlier than scheduled due to increased pain and swelling in the right knee. She had been doing well postoperatively but started having increased pain and swelling and loss of range of motion in the right knee. She was seen by her PCP and underwent D-dimer testing ultrasound testing. D-dimer was elevated but ultrasound was negative for blood clot. She rates pain at 6/10. She has been taking oxycodone to help with pain control. She now has more difficulty in knee flexion than she had last week. She denies fevers or chills. She does report increased swelling in the knee. Patient Active Problem List   Diagnosis    Mixed hyperlipidemia    Colon polyp    Essential hypertension    Class 1 obesity due to excess calories without serious comorbidity with body mass index (BMI) of 33.0 to 33.9 in adult    History of colon cancer, stage I    Benign neoplasm of skin of face    Ganglion    Hypertrophic and atrophic condition of skin    Lumbar stenosis    Vitamin D deficiency    Family history of type 2 diabetes mellitus    Carotid artery calcification, bilateral    Prediabetes    Atherosclerosis of abdominal aorta (HCC)    RUQ pain    Primary osteoarthritis of right knee    Arthritis of right knee       ROS:  Constitutional: denies fever, chills, weight loss  MSK: denies pain in other joints, muscle aches  Neurological: denies numbness, tingling, weakness    Exam:  Resp.  rate 16, height 5' 6.5\" (1.689 m), weight 187 lb (84.8 kg)    Appearance: sitting in exam room chair, appears to be in no acute distress, awake and alert  Resp: unlabored breathing on room air  Skin: warm, dry and intact with out erythema or significant increased temperature  Neuro: grossly intact both lower extremities. Intact sensation to light touch. Motor exam 4+ to 5/5 in all major motor groups. Right knee: Incision is healed. There is a moderate knee effusion. Active knee range of motion is 0 to 90 degrees. Sensation is intact light touch. There is brisk capillary refill. There is 5/5 muscle strength in all muscle groups. Mild diffuse swelling of the leg. Imaging:  None    Assessment:  S/p right total knee arthroplasty with increased pain and swelling    Plan:  We discussed the cause of her increased pain and swelling. The ultrasound was negative for blood clot. She did have elevated D-dimer. She also has increased pain and swelling in the knee. I recommended and performed a right knee joint aspiration today to evaluate for infection and to help with knee range of motion by decompressing the knee joint. Since the time of aspiration, the labs have come back as normal.  I communicated this finding to the patient. I did prescribe tizanidine to help with pain control as well. She will continue work with physical therapy to work on knee joint strength and range of motion. She is unsure if she wants to take Eliquis. I recommended at least taking aspirin 81 mg twice daily for an additional 2 weeks given her swelling and elevated D-dimer. She will follow-up here in 2 to 3 weeks for repeat assessment and a check of her range of motion. Procedure:  After verbal consent was obtained, the right knee was prepped with Betadine. This was followed by an aspiration of 20 mL of synovial fluid. The patient tolerated procedure well and there were no complications. This dictation was done with [employer] dictation and may contain mechanical errors related to translation.

## 2022-09-20 ENCOUNTER — HOSPITAL ENCOUNTER (OUTPATIENT)
Dept: PHYSICAL THERAPY | Age: 72
Setting detail: THERAPIES SERIES
Discharge: HOME | End: 2022-09-20
Payer: MEDICARE

## 2022-09-20 PROCEDURE — 97110 THERAPEUTIC EXERCISES: CPT | Performed by: PHYSICAL THERAPIST

## 2022-09-20 PROCEDURE — 97530 THERAPEUTIC ACTIVITIES: CPT | Performed by: PHYSICAL THERAPIST

## 2022-09-20 NOTE — FLOWSHEET NOTE
[facility] [place]  Phone: [phone]   Fax:     [phone]    Physical Therapy Treatment Note/ Progress Report:     Date:  2022  Patient Name:  Shawna Salomon    :  1950  MRN: [id number]  Restrictions/Precautions:  hx of cancer; recent surg  Medical/Treatment Diagnosis Information:  Diagnosis: M17.11 (ICD-10-CM) - Primary osteoarthritis of right knee   Treatment Diagnosis: decreased abilty to ambualte and function   DOS 22   R TKA  Insurance/Certification information:   [facility] [place] Medicare  Physician Information:   Dr. Shital Russell  Has the plan of care been signed (Y/N):        []  Yes  [x]  No     Date of Patient follow up with Physician: 22      Is this a Progress Report:     []  Yes  [x]  No        If Yes:  Date Range for reporting period:  Beginning 22  Ending    Progress report will be due (10 Rx or 30 days whichever is less):        Recertification will be due (POC Duration  / 90 days whichever is less): 10/6/22         Visit # Insurance Allowable Auth Required   4 BMN [x]  Yes []  No        Functional Scale: FOTO 31; LEFS 17.7    Date assessed:  22      Latex Allergy:  [x]NO      []YES  Preferred Language for Healthcare:   [x]English       []other:    Pain level:  6/10     SUBJECTIVE:  4+ weeks s/p surg  Pt feels that her stiffness fluctuates depending on day w/o known pattern. She had  yesterday and was on her feet more in sandals. Pt did take pain medication prior to PT today. OBJECTIVE: See eval  Observation: incision healing well but adhesions noted ;  Girth measurements: L 43 cm @ joint line; 45 cm @ superior patella; R 47 cm @ joint line; 48 cm @ superior patella;   9/15/22 R 46 cm @ joint line; 48 cm @ superior patella;     Palpation: tender around knee joint generally into lower leg;     Functional Mobility/Transfers: ind but slow and cautious w/ R LE for transfers; Posture: ant tilt , increased lumbar lordosis, genu valgus noted     Gait- ambulates with er of right LE, min limp noted using SPC, decreased stance phase and shortened stride length;              PROM AROM     L R L R   Hip Flexion 110 100 100 100   Knee Flexion 120 120 120 110 @ on floor  (110 pre surg)   Knee Extension 0 -15 0 -0 (-20 pre surg)         9/6/22 pre surg measurements  Strength (0-5) Left Right   Hip Flexion - seated 5 4   Hip Abduction - sidelyling 5 4   Hip Extension 5 4   Quads 27# Poor (17#)   Hamstrings 5 4   Ankle DF 5 4   Ankle PF 5 4           Flexibility       Hamstrings (90/90) -10 -15   ITB (Maicol) tight tight   Quads (Ely's) tighit 90   Hip Flexor (Jerry) tight tight           Girth       Mid patella 44 45   Suprapatellar 48 49      Test measurements:      RESTRICTIONS/PRECAUTIONS: colon cancer; recent R TKA    Exercises/Interventions:   Exercise/Equipment Resistance/Repetitions Other comments   Cardio/Warm-up     Bike     Treadmill     NuStep X5 min warm up    Stretching     Hamstring Seated 10:\" x10    Hip Flexion     ITB     Grion     Quad     Inclined Calf     Towel Pull 10\"x10         ROM     Passive     Active Assisted at side of table 10\"x10 w/ foot on floor and sliding on board w/ PT assistance     Rolling on SB using strap 10\"x10     Prone stretch w/ towel 10\"x10    Weight Shift     Weight Hangs     Sheet Pulls     Ankle Pumps           Patellar Glides     Medial     Superior     Inferior          STRENGTH     SLR     Supine 3x10 over foam roll Cuing needed for contraction   Prone     Abduction    Standing abd 3x10 + wt NPV   Adducton PS 10\"x10+ glut    SLR+          Isometrics     Quad sets 10\" x10 over foam roll         CKC     Calf raises x30    Wall sits     Step ups 4\" x10    1 leg stand     Squatting     CC TKE Blue TB 10\"x10    Balance         PRE     Extension 2# x30 RANGE:   Flexion  RANGE:   Leg Press  RANGE:        Cable Column     Ed on POC, expectations and goals X10'; 9/8/22 reviewed         Manual/Modalities     Fluid movement and scar massage x10'           Therapeutic Exercise and NMR EXR  [x] (56381) Provided verbal/tactile cueing for activities related to strengthening, flexibility, endurance, ROM for improvements in LE, proximal hip, and core control with self care, mobility, lifting, ambulation. [x] (44605) Provided verbal/tactile cueing for activities related to improving balance, coordination, kinesthetic sense, posture, motor skill, proprioception  to assist with LE, proximal hip, and core control in self care, mobility, lifting, ambulation and eccentric single leg control. NMR and Therapeutic Activities:    [x] (59644 or 83501) Provided verbal/tactile cueing for activities related to improving balance, coordination, kinesthetic sense, posture, motor skill, proprioception and motor activation to allow for proper function of core, proximal hip and LE with self care and ADLs  [] (80245) Gait Re-education- Provided training and instruction to the patient for proper LE, core and proximal hip recruitment and positioning and eccentric body weight control with ambulation re-education including up and down stairs     Home Exercise Program:    [x] (28822) Reviewed/Progressed HEP activities related to strengthening, flexibility, endurance, ROM of core, proximal hip and LE for functional self-care, mobility, lifting and ambulation/stair navigation            Written HEP est    Access Code: [email or web address]  URL: [email or web address]  Date: 09/06/2022  Prepared by: Carol Keenan    Exercises  Long Sitting Calf Stretch with Strap - 2 x daily - 7 x weekly - 1 sets - 10 reps - 10 hold  Seated Table Hamstring Stretch - 2 x daily - 7 x weekly - 1 sets - 10 reps - 10 hold  Long Sitting Quad Set - 2 x daily - 7 x weekly - 1 sets - 10 reps - 10 hold  Active Straight Leg Raise with Quad Set - 2 x daily - 7 x weekly - 4 sets - 5 reps  Supine Hip Adduction Isometric with Ball - 2 x daily - 7 x weekly - 1 sets - 10 reps - 10 hold  Standing Terminal Knee Extension with Resistance - 2 x daily - 7 x weekly - 1 sets - 10 reps - 10 hold  Seated Knee Flexion AAROM - 2 x daily - 7 x weekly - 1 sets - 10 reps - 10 hold      [] (28046)Reviewed/Progressed HEP activities related to improving balance, coordination, kinesthetic sense, posture, motor skill, proprioception of core, proximal hip and LE for self care, mobility, lifting, and ambulation/stair navigation      Manual Treatments:  PROM / STM / Oscillations-Mobs:  G-I, II, III, IV (PA's, Inf., Post.)  [] (79628) Provided manual therapy to mobilize LE, proximal hip and/or LS spine soft tissue/joints for the purpose of modulating pain, promoting relaxation,  increasing ROM, reducing/eliminating soft tissue swelling/inflammation/restriction, improving soft tissue extensibility and allowing for proper ROM for normal function with self care, mobility, lifting and ambulation. Modalities:  declined but will do at home   [] GAME READY (VASO)- for significant edema, swelling, pain control. Charges:  Timed Code Treatment Minutes: 45   Total Treatment Minutes: 45     [] EVAL (LOW) 19806 (typically 20 minutes face-to-face)  [] EVAL (MOD) 33182 (typically 30 minutes face-to-face)  [] EVAL (HIGH) 28108 (typically 45 minutes face-to-face)  [] RE-EVAL   [x] QR(87650) 2x30   [] IONTO  [] NMR (31579) 1x10     [] VASO  [] Manual (64859) x      [] Other:GT using SPC  [x] TA (76101) x      [] Mech Traction (28050)  [] ES(attended) (03507)      [] ES (un) (29833):       GOALS:  Patient stated goal: full function of R knee to return to work  [] Progressing: [] Met: [] Not Met: [] Adjusted     Therapist goals for Patient:   Short Term Goals: To be achieved in: 2 weeks  1. Independent in HEP and progression per patient tolerance, in order to prevent re-injury. [] Progressing: [] Met: [] Not Met: [] Adjusted  2.  Patient will have a decrease in pain to facilitate improvement in movement, function, and ADLs as indicated by Functional Deficits. [] Progressing: [] Met: [] Not Met: [] Adjusted     Long Term Goals: To be achieved in: 12 weeks  1. Functional index score of 61 or more for FOTO to assist with reaching prior level of function. [] Progressing: [] Met: [] Not Met: [] Adjusted  2. Patient will demonstrate increased AROM to 0-125 deg to allow for proper joint functioning as indicated by patients Functional Deficits. [] Progressing: [] Met: [] Not Met: [] Adjusted  3. Patient will demonstrate an increase in Strength to good proximal hip strength and control, within 5lb HHD in LE to allow for proper functional mobility as indicated by patients Functional Deficits. [] Progressing: [] Met: [] Not Met: [] Adjusted  4. Patient will return to 90% functional activities without increased symptoms or restriction. [] Progressing: [] Met: [] Not Met: [] Adjusted  5. Pt will be able to return to work w/ min to no limitations standing 8 hr/day. (patient specific functional goal)    [] Progressing: [] Met: [] Not Met: [] Adjusted    Overall Progression Towards Functional goals/ Treatment Progress Update:  [] Patient is progressing as expected towards functional goals listed. [] Progression is slowed due to complexities/Impairments listed. [] Progression has been slowed due to co-morbidities. [x] Plan just implemented, too soon to assess goals progression <30days   [] Goals require adjustment due to lack of progress  [] Patient is not progressing as expected and requires additional follow up with physician  [] Other    ASSESSMENT:  Pt is very anxious about bending R knee due to pain levels. Pt did take pain medication prior to arrival to PT but min effect noted. Believe majority of pt's pain at this time is related to neurological symptoms. Ed given on massage to R thigh generally and to rub knee w/ different fabric for desensitization.  AAROM in sitting w/ active motion resulted in inc ROM w/ less apprehension and min tightness at end range. Empty end feel noted w/ stretching. Treatment/Activity Tolerance:  [] Patient tolerated treatment well [] Patient limited by fatique  [x] Patient limited by pain  [] Patient limited by other medical complications  [] Other:     Prognosis: [x] Good [] Fair  [] Poor    Patient Requires Follow-up: [x] Yes  [] No    PLAN: See eval  [x] Continue per plan of care [] Alter current plan (see comments)  [x] Plan of care initiated [] Hold pending MD visit [] Discharge    Electronically signed by: Carlito Strickland, PT, MS, OMT-C    Physical Therapist [id number] [address] license #[id number]  Physical Therapist [place] license #[id number]    Note: If patient does not return for scheduled/ recommended follow up visits, this note will serve as a discharge from care along with most recent update on progress.

## 2022-09-22 ENCOUNTER — HOSPITAL ENCOUNTER (OUTPATIENT)
Dept: PHYSICAL THERAPY | Age: 72
Setting detail: THERAPIES SERIES
Discharge: HOME | End: 2022-09-22
Payer: MEDICARE

## 2022-09-22 ENCOUNTER — TELEPHONE (OUTPATIENT)
Dept: ORTHOPEDIC SURGERY | Age: 72
End: 2022-09-22

## 2022-09-22 PROCEDURE — 97530 THERAPEUTIC ACTIVITIES: CPT | Performed by: PHYSICAL THERAPIST

## 2022-09-22 PROCEDURE — 97110 THERAPEUTIC EXERCISES: CPT | Performed by: PHYSICAL THERAPIST

## 2022-09-22 NOTE — FLOWSHEET NOTE
[address] [facility] [address]  Phone: [phone]   Fax:     [phone]    Physical Therapy Treatment Note/ Progress Report:     Date:  2022  Patient Name:  Mariel Carr    :  1950  MRN: [id number]  Restrictions/Precautions:  hx of cancer; recent surg  Medical/Treatment Diagnosis Information:  Diagnosis: M17.11 (ICD-10-CM) - Primary osteoarthritis of right knee   Treatment Diagnosis: decreased abilty to ambualte and function   DOS 22   R TKA  Insurance/Certification information:   [employer]  Physician [place]  Has the plan of care been signed (Y/N):        []  Yes  [x]  No     Date of Patient follow up with Physician: 22      Is this a Progress Report:     []  Yes  [x]  No        If Yes:  Date Range for reporting period:  Beginning 22  Ending    Progress report will be due (10 Rx or 30 days whichever is less):        Recertification will be due (POC Duration  / 90 days whichever is less): 10/6/22         Visit # Insurance Allowable Auth Required   5 BMN [x]  Yes []  No        Functional Scale: FOTO 31; LEFS 17.7    Date assessed:  22      Latex Allergy:  [x]NO      []YES  Preferred Language for Healthcare:   [x]English       []other:    Pain level:  5-6/10     SUBJECTIVE:  5 weeks s/p surg  Pt feels that her knee remains stiff. She is trying to walk at home but also stretch. She did get on her recumbent bike yesterday and did oscillations. She has been icing more. Pt did try to get into her  seat yesterday and was able to get into car despite tightness in knee but not comfortable w/ pedals.      RTW scheduled for       OBJECTIVE: See eval  Observation: incision healing well but adhesions noted ;  Girth measurements: L 43 cm @ joint line; 45 cm @ superior patella; R 47 cm @ joint line; 48 cm @ superior patella;   9/15/22 R 46 cm @ joint line; 48 cm @ superior patella;  9/22/22  R 46 cm @ joint line; 47 cm @ superior patella;     Palpation: tender around knee joint generally into lower leg;     Functional Mobility/Transfers: ind but slow and cautious w/ R LE for transfers;     Posture: ant tilt , increased lumbar lordosis, genu valgus noted     Gait- ambulates with min limp noted using SPC, decreased stance phase and shortened stride length;              PROM AROM     L R L R   Hip Flexion 110 100 100 100   Knee Flexion 120 120 120 110 @ on floor  (110 pre surg)   Knee Extension 0 -15 0 -0 (-20 pre surg)         9/6/22 pre surg measurements  Strength (0-5) Left Right   Hip Flexion - seated 5 4   Hip Abduction - sidelyling 5 4   Hip Extension 5 4   Quads 27# 9/6/22 Poor (17#)   Hamstrings 5 4   Ankle DF 5 4   Ankle PF 5 4           Flexibility       Hamstrings (90/90) -10 -15   ITB (Maicol) tight tight   Quads (Ely's) tighit 90   Hip Flexor (Jerry) tight tight           Girth       Mid patella 44 45   Suprapatellar 48 49      Test measurements:      RESTRICTIONS/PRECAUTIONS: colon cancer; recent R TKA    Exercises/Interventions:   Exercise/Equipment Resistance/Repetitions Other comments   Cardio/Warm-up     Bike     Treadmill     NuStep X5 min warm up    Stretching     Hamstring Seated 10:\" x10    Hip Flexion     ITB     Grion     Quad     Inclined Calf 10\"x10    Towel Pull         ROM     Passive     Active Assisted at side of table 10\"x10 w/ foot on floor and sliding on board w/ PT assistance     Rolling on SB using strap 10\"x10     Prone stretch w/ towel 10\"x10    Weight Shift Onto step 10\"x10    Weight Hangs     Sheet Pulls     Ankle Pumps           Patellar Glides     Medial     Superior     Inferior          STRENGTH     SLR     Supine 3x10 over foam roll Cuing needed for contraction   Prone     Abduction    Standing abd 3x10 + wt NPV   Adducton PS 10\"x10+ glut    SLR+          Isometrics     Quad sets 10\" x10 over foam roll         CKC     Calf raises x30    Wall sits Step ups 4\" x15     4\" x10    1 leg stand     Squatting     CC TKE Blue TB 10\"x10    Balance     Fitter NPV    PRE     Extension 2# x30- machine NPV RANGE:   Flexion NPV RANGE:   Leg Press 75# 3x10 RANGE:        Cable Column     Ed on POC, expectations and goals X10'; 9/8/22 reviewed         Manual/Modalities     Fluid movement and scar massage x10'           Therapeutic Exercise and NMR EXR  [x] (02333) Provided verbal/tactile cueing for activities related to strengthening, flexibility, endurance, ROM for improvements in LE, proximal hip, and core control with self care, mobility, lifting, ambulation. [x] (58937) Provided verbal/tactile cueing for activities related to improving balance, coordination, kinesthetic sense, posture, motor skill, proprioception  to assist with LE, proximal hip, and core control in self care, mobility, lifting, ambulation and eccentric single leg control. NMR and Therapeutic Activities:    [x] (85301 or 58874) Provided verbal/tactile cueing for activities related to improving balance, coordination, kinesthetic sense, posture, motor skill, proprioception and motor activation to allow for proper function of core, proximal hip and LE with self care and ADLs  [] (14983) Gait Re-education- Provided training and instruction to the patient for proper LE, core and proximal hip recruitment and positioning and eccentric body weight control with ambulation re-education including up and down stairs     Home Exercise Program:    [x] (67610) Reviewed/Progressed HEP activities related to strengthening, flexibility, endurance, ROM of core, proximal hip and LE for functional self-care, mobility, lifting and ambulation/stair navigation            Written HEP est    Access Code: [email or web address]  URL: [email or web address]  Date: 09/06/2022  Prepared by: Jn Meza    Exercises  Long Sitting Calf Stretch with Strap - 2 x daily - 7 x weekly - 1 sets - 10 reps - 10 hold  Seated Table Hamstring Stretch - 2 x daily - 7 x weekly - 1 sets - 10 reps - 10 hold  Long Sitting Quad Set - 2 x daily - 7 x weekly - 1 sets - 10 reps - 10 hold  Active Straight Leg Raise with Quad Set - 2 x daily - 7 x weekly - 4 sets - 5 reps  Supine Hip Adduction Isometric with Ball - 2 x daily - 7 x weekly - 1 sets - 10 reps - 10 hold  Standing Terminal Knee Extension with Resistance - 2 x daily - 7 x weekly - 1 sets - 10 reps - 10 hold  Seated Knee Flexion AAROM - 2 x daily - 7 x weekly - 1 sets - 10 reps - 10 hold      [] (99284)Reviewed/Progressed HEP activities related to improving balance, coordination, kinesthetic sense, posture, motor skill, proprioception of core, proximal hip and LE for self care, mobility, lifting, and ambulation/stair navigation      Manual Treatments:  PROM / STM / Oscillations-Mobs:  G-I, II, III, IV (PA's, Inf., Post.)  [x] (90359) Provided manual therapy to mobilize LE, proximal hip and/or LS spine soft tissue/joints for the purpose of modulating pain, promoting relaxation,  increasing ROM, reducing/eliminating soft tissue swelling/inflammation/restriction, improving soft tissue extensibility and allowing for proper ROM for normal function with self care, mobility, lifting and ambulation. Modalities:  declined but will do at home   [] GAME READY (VASO)- for significant edema, swelling, pain control.      Charges:  Timed Code Treatment Minutes: 45   Total Treatment Minutes: 45     [] EVAL (LOW) 80499 (typically 20 minutes face-to-face)  [] EVAL (MOD) 25569 (typically 30 minutes face-to-face)  [] EVAL (HIGH) 45993 (typically 45 minutes face-to-face)  [] RE-EVAL   [x] BG(88844) 2x30   [] IONTO  [] NMR (49259) 1x10     [] VASO  [] Manual (28371) x      [] Other:GT using SPC  [x] TA (51785) x      [] Mech Traction (06248)  [] ES(attended) (60712)      [] ES (un) (22250):       GOALS:  Patient stated goal: full function of R knee to return to work  [] Progressing: [] Met: [] Not Met: [] Adjusted Therapist goals for Patient:   Short Term Goals: To be achieved in: 2 weeks  1. Independent in HEP and progression per patient tolerance, in order to prevent re-injury. [] Progressing: [] Met: [] Not Met: [] Adjusted  2. Patient will have a decrease in pain to facilitate improvement in movement, function, and ADLs as indicated by Functional Deficits. [] Progressing: [] Met: [] Not Met: [] Adjusted     Long Term Goals: To be achieved in: 12 weeks  1. Functional index score of 61 or more for FOTO to assist with reaching prior level of function. [] Progressing: [] Met: [] Not Met: [] Adjusted  2. Patient will demonstrate increased AROM to 0-125 deg to allow for proper joint functioning as indicated by patients Functional Deficits. [] Progressing: [] Met: [] Not Met: [] Adjusted  3. Patient will demonstrate an increase in Strength to good proximal hip strength and control, within 5lb HHD in LE to allow for proper functional mobility as indicated by patients Functional Deficits. [] Progressing: [] Met: [] Not Met: [] Adjusted  4. Patient will return to 90% functional activities without increased symptoms or restriction. [] Progressing: [] Met: [] Not Met: [] Adjusted  5. Pt will be able to return to work w/ min to no limitations standing 8 hr/day. (patient specific functional goal)    [] Progressing: [] Met: [] Not Met: [] Adjusted    Overall Progression Towards Functional goals/ Treatment Progress Update:  [] Patient is progressing as expected towards functional goals listed. [] Progression is slowed due to complexities/Impairments listed. [] Progression has been slowed due to co-morbidities. [x] Plan just implemented, too soon to assess goals progression <30days   [] Goals require adjustment due to lack of progress  [] Patient is not progressing as expected and requires additional follow up with physician  [] Other    ASSESSMENT:  ROM limited by pain in R knee and was more limited today.  Pt is anxious and get nervous w/ bending to empty end feel with overpressure. Strength is progressing in R LE w/ improved quad contraction. Steps up & lat went well w/ control. Added leg press that pt liked for motion and strength. Pt visually looked like more edema in entire R LE on arrival w/o known cause but girth measurements showed similar results. No signs of infection and no TTP in calf or signs of concern for DVT. Function and gt skills are progressing with motion the primary concern. Treatment/Activity Tolerance:  [] Patient tolerated treatment well [] Patient limited by fatique  [x] Patient limited by pain  [] Patient limited by other medical complications  [] Other:     Prognosis: [x] Good [] Fair  [] Poor    Patient Requires Follow-up: [x] Yes  [] No    PLAN: Cont therapy for rehab after R TKA. Cont therapy after MD appt on Monday. [x] Continue per plan of care [] Alter current plan (see comments)  [x] Plan of care initiated [] Hold pending MD visit [] Discharge    Electronically signed by: Batsheva Ratliff, PT, MS, OMT-C    Physical Therapist [place] license #[id number]  Physical Therapist [address] [place] [address] tidy license #[id number]    Note: If patient does not return for scheduled/ recommended follow up visits, this note will serve as a discharge from care along with most recent update on progress.

## 2022-09-26 ENCOUNTER — TELEPHONE (OUTPATIENT)
Dept: ORTHOPEDIC SURGERY | Age: 72
End: 2022-09-26

## 2022-09-26 ENCOUNTER — OFFICE VISIT (OUTPATIENT)
Dept: ORTHOPEDIC SURGERY | Age: 72
End: 2022-09-26

## 2022-09-26 VITALS — HEIGHT: 67 IN | BODY MASS INDEX: 29.35 KG/M2 | WEIGHT: 187 LBS | RESPIRATION RATE: 16 BRPM

## 2022-09-26 DIAGNOSIS — Z96.651 STATUS POST TOTAL RIGHT KNEE REPLACEMENT: Primary | ICD-10-CM

## 2022-09-26 PROCEDURE — 99024 POSTOP FOLLOW-UP VISIT: CPT | Performed by: ORTHOPAEDIC SURGERY

## 2022-09-26 NOTE — TELEPHONE ENCOUNTER
Auth: NPR  Date: 09/30/22  Reference # None  Spoke with: Online  Type of SX: Outpatient  Location: [facility]  CPT: 58500   DX: [id number]  SX area: Rt knee  Insurance: [employer]

## 2022-09-26 NOTE — PROGRESS NOTES
[facility]  Office Visit    Chief Complaint: Follow-up s/p right total knee arthroplasty    HPI:  Mesha Wallace is a 66 y.o. who is here in follow-up of right total knee arthroplasty performed on August 17, 2022. She continues to have intermittent pain. She is walking with use of a cane. She is working with physical therapy. She continues to have issues with stiffness in the knee. She underwent recent aspiration which was negative for infection. Patient Active Problem List   Diagnosis    Mixed hyperlipidemia    Colon polyp    Essential hypertension    Class 1 obesity due to excess calories without serious comorbidity with body mass index (BMI) of 33.0 to 33.9 in adult    History of colon cancer, stage I    Benign neoplasm of skin of face    Ganglion    Hypertrophic and atrophic condition of skin    Lumbar stenosis    Vitamin D deficiency    Family history of type 2 diabetes mellitus    Carotid artery calcification, bilateral    Prediabetes    Atherosclerosis of abdominal aorta (HCC)    RUQ pain    Primary osteoarthritis of right knee    Arthritis of right knee       ROS:  Constitutional: denies fever, chills, weight loss  MSK: denies pain in other joints, muscle aches  Neurological: denies numbness, tingling, weakness    Exam:  Resp. rate 16, height 5' 6.5\" (1.689 m), weight 187 lb (84.8 kg)    Appearance: sitting in exam room chair, appears to be in no acute distress, awake and alert  Resp: unlabored breathing on room air  Skin: warm, dry and intact with out erythema or significant increased temperature  Neuro: grossly intact both lower extremities. Intact sensation to light touch. Motor exam 4+ to 5/5 in all major motor groups. Right knee: Incision is healed. Active knee range of motion is 0 to 95 degrees. Sensation is intact light touch. There is brisk capillary refill. There is 5/5 muscle strength in all muscle groups.   Mild diffuse swelling of the leg.    Imaging:  None    Assessment:  S/p right total knee arthroplasty with arthrofibrosis    Plan:  Recent aspiration was negative for infection. She remains stiff. At this point, I recommended manipulation under anesthesia to help with her postoperative stiffness. All risks, benefits, potential complications of the proposed procedure were explained and discussed which include but are not limited to persistent pain, stiffness, intraprocedure fracture, cardiopulmonary complications, DVT, pulmonary embolism, and side effects from anesthesia. Plan for right knee meniscus under anesthesia on Friday. This dictation was done with [employer] dictation and may contain mechanical errors related to translation.

## 2022-09-27 ENCOUNTER — HOSPITAL ENCOUNTER (OUTPATIENT)
Dept: PHYSICAL THERAPY | Age: 72
Setting detail: THERAPIES SERIES
Discharge: HOME | End: 2022-09-27
Payer: MEDICARE

## 2022-09-27 LAB
BODY FLUID CULTURE, STERILE: ABNORMAL
BODY FLUID CULTURE, STERILE: ABNORMAL
GRAM STAIN RESULT: ABNORMAL
ORGANISM: ABNORMAL

## 2022-09-27 PROCEDURE — 97530 THERAPEUTIC ACTIVITIES: CPT | Performed by: PHYSICAL THERAPIST

## 2022-09-27 PROCEDURE — 97110 THERAPEUTIC EXERCISES: CPT | Performed by: PHYSICAL THERAPIST

## 2022-09-27 NOTE — FLOWSHEET NOTE
Preoperative Screening for Elective Surgery/Invasive Procedures While COVID-19 present in the community     Have you tested positive or have been told to self-isolate for COVID-19 like symptoms within the past 28 days? Do you currently have any of the following symptoms? Fever >100.0 F or 99.9 F in immunocompromised patients? New onset cough, shortness of breath or difficulty breathing? New onset sore throat, myalgia (muscle aches and pains), headache, loss of taste/smell or diarrhea? Have you had a potential exposure to COVID-19 within the past 14 days by:  Close contact with a confirmed case? Close contact with a healthcare worker,  or essential infrastructure worker (grocery store, [employer], gas station, public utilities or transportation)? Do you reside in a congregate setting such as; skilled nursing facility, adult home, correctional facility, homeless shelter or other institutional setting? Have you had recent travel to a known COVID-19 hotspot? No to all above       * Admitted with diarrhea? [] YES    [x]  NO     *Prior history of C-Diff. In last 3 months? [] YES    [x]  NO     *Antibiotic use in the past 6-8 weeks? [x]  NO    []  YES      If yes, which: REASON_________________     *Prior hospitalization or nursing home in the last month? []  YES    [x]  NO     SAFETY FIRST. .call before you fall    [facility] time____9/30/22 1045________        Surgery time_______1245_____    Do not eat or drink anything after 12:00 midnight prior to your surgery. This includes water chewing gum, mints and ice chips- the Day of Surgery. You may brush your teeth and gargle the morning of your surgery, but do not swallow the water     Please see your family doctor/pediatrician for a history and physical and/or questions concerning medications. Bring any test results/reports from your physicians office.    If you are under the care of a heart doctor or specialist doctor, please be aware that you may be asked to them for clearance    You may be asked to stop blood thinners such as Coumadin, Plavix, Fragmin, Lovenox, etc., or any anti-inflammatories such as:  Aspirin, Ibuprofen, Advil, Naproxen prior to your surgery. We also ask that you stop any OTC medications such as fish oil, vitamin E, glucosamine, garlic, Multivitamins, COQ 10, etc.    We ask that you do not smoke 24 hours prior to surgery  We ask that you do not  drink any alcoholic beverages 24 hours prior to surgery     You must make arrangements for a responsible adult to take you home after your surgery. For your safety you will not be allowed to leave alone or drive yourself home. Your surgery will be cancelled if you do not have a ride home. Also for your safety, it is strongly suggested that someone stay with you the first 24 hours after your surgery. A parent or legal guardian must accompany a child scheduled for surgery and plan to stay at the hospital until the child is discharged. Please do not bring other children with you. For your comfort, please wear simple loose fitting clothing to the hospital.  Please do not bring valuables. Do not wear any make-up or nail polish on your fingers or toes. For your safety, please do not wear any jewelry or body piercing's on the day of surgery. All jewelry must be removed. If you have dentures, they will be removed before going to operating room. For your convenience, we will provide you with a container. If you wear contact lenses or glasses, they will be removed, please bring a case for them. If you have a living will and a durable power of  for healthcare, please bring in a copy.      As part of our patient safety program to minimize surgical site infections, we ask you to do the following:    Please notify your surgeon if you develop any illness between         now and the day of your surgery. This includes a cough, cold, fever, sore throat, nausea,         or vomiting, and diarrhea, etc.   Please notify your surgeon if you experience dizziness, shortness         of breath or blurred vision between now and the time of your surgery. Do not shave your operative site 96 hours prior to surgery. For face and neck surgery, men may use an electric razor 48 hours   prior to surgery. You may shower the night before surgery or the morning of   your surgery with an antibacterial soap. You will need to bring a photo ID and insurance card     If you use a C-pap or Bi-pap machine, please bring your machine with you to the hospital     Our goal is to provide you with excellent care, therefore, visitors will be limited to so that we may focus on providing this care for you. Please contact your surgeon office, if you have any further questions. [facility] phone number:  [phone] [facility] fax number:  [phone]    Please note these are generalized instructions for all surgical cases, you may be provided with more specific instructions according to your surgery.

## 2022-09-27 NOTE — FLOWSHEET NOTE
[facility] [place]  Phone: [phone]   Fax: [phone]    Physical Therapy Treatment Note/ Progress Report:     Date:  2022  Patient Name:  Rony Hawkins    :  1950  MRN: [id number]  Restrictions/Precautions:  hx of cancer; recent surg  Medical/Treatment Diagnosis Information:  Diagnosis: M17.11 (ICD-10-CM) - Primary osteoarthritis of right knee   Treatment Diagnosis: decreased abilty to ambualte and function   DOS 22   R TKA  Insurance/Certification information:   [facility] Medicare  Physician Information:   Dr. Jayshree Morrison  Has the plan of care been signed (Y/N):        []  Yes  [x]  No     Date of Patient follow up with Physician: 22      Is this a Progress Report:     []  Yes  [x]  No        If Yes:  Date Range for reporting period:  Beginning 22  Ending    Progress report will be due (10 Rx or 30 days whichever is less):        Recertification will be due (POC Duration  / 90 days whichever is less): 10/6/22         Visit # Insurance Allowable Auth Required   5 BMN [x]  Yes []  No        Functional Scale: FOTO 31; LEFS 17.7    Date assessed:  22      Latex Allergy:  [x]NO      []YES  Preferred Language for Healthcare:   [x]English       []other:    Pain level:  5-6/10     SUBJECTIVE:  5+ weeks s/p surg  Pt had MD appt on 22. Due to pt's persistent tightness in knee motion, pt has been scheduled for R knee manipulation on Friday, 22. Pt is frustrated w/ outcome. She has been walking more w/ SPC w/o problems. Pt is taking pain medication prior to PT only. She slept last night w/o disturbances.      RTW scheduled for     OBJECTIVE: See eval  Observation: incision healing well but adhesions noted ;  Girth measurements: L 43 cm @ joint line; 45 cm @ superior patella; R 47 cm @ joint line; 48 cm @ superior patella;  9/15/22 R 46 cm @ joint line; 48 cm @ superior patella;  9/22/22  R 46 cm @ joint line; 47 cm @ superior patella;     Palpation: hypersensitive/tender around knee joint generally into lower leg;     Functional Mobility/Transfers: ind but slow and cautious w/ R LE for transfers;     Posture: ant tilt , increased lumbar lordosis, genu valgus noted     Gait- ambulates with min limp noted using SPC, decreased stance phase and shortened stride length;              PROM AROM     L R L R   Hip Flexion 110 100 100 100   Knee Flexion 120 120 120 110 @ on floor  (110 pre surg)   Knee Extension 0 -15 0 -0 (-20 pre surg)         9/6/22 pre surg measurements  Strength (0-5) Left Right   Hip Flexion - seated 5 4   Hip Abduction - sidelyling 5 4   Hip Extension 5 4   Quads 27# 9/6/22 Poor (17#)   Hamstrings 5 4   Ankle DF 5 4   Ankle PF 5 4           Flexibility       Hamstrings (90/90) -10 -15   ITB (Maicol) tight tight   Quads (Ely's) tighit 90   Hip Flexor (Jerry) tight tight           Girth       Mid patella 44 45   Suprapatellar 48 49      Test measurements:      RESTRICTIONS/PRECAUTIONS: colon cancer; recent R TKA    Exercises/Interventions:   Exercise/Equipment Resistance/Repetitions Other comments   Cardio/Warm-up     Bike     Treadmill     NuStep X5 min warm up    Stretching     Hamstring Seated 10:\" x10    Hip Flexion     ITB     Grion     Quad     Inclined Calf 10\"x10    Towel Pull         ROM     Passive     Active Assisted at side of table     Rolling on SB using strap 10\"x10     Prone stretch w/ towel 10\"x10    Weight Shift Onto step 10\"x10    Weight Hangs     Sheet Pulls     Ankle Pumps           Patellar Glides     Medial     Superior     Inferior          STRENGTH     SLR     Supine 3x10 over foam roll Cuing needed for contraction   Standing hip ext 2# 3x10    Abduction    Standing abd 2# 3x10    Adducton PS 10\"x10+ bridge    SLR+          Isometrics     Quad sets 10\" x10 over foam roll         CKC     Calf raises x30    Wall sits     Step ups 4\" x15     4\" x15 1 leg stand     Squatting     CC TKE Blue TB 10\"x10    Balance     Fitter NPV    PRE     Extension 10# x20- machine RANGE:   Flexion 20# 3x10- machine RANGE:   Leg Press 75# 3x10 RANGE:        Cable Column     Ed on POC, expectations and goals X10'; 9/8/22 reviewed         Manual/Modalities     Fluid movement and scar massage X10'; ed to continue this to loosen muscles prior to manipulation           Therapeutic Exercise and NMR EXR  [x] (88099) Provided verbal/tactile cueing for activities related to strengthening, flexibility, endurance, ROM for improvements in LE, proximal hip, and core control with self care, mobility, lifting, ambulation. [x] (14412) Provided verbal/tactile cueing for activities related to improving balance, coordination, kinesthetic sense, posture, motor skill, proprioception  to assist with LE, proximal hip, and core control in self care, mobility, lifting, ambulation and eccentric single leg control. NMR and Therapeutic Activities:    [x] (11560 or 06773) Provided verbal/tactile cueing for activities related to improving balance, coordination, kinesthetic sense, posture, motor skill, proprioception and motor activation to allow for proper function of core, proximal hip and LE with self care and ADLs  [] (15035) Gait Re-education- Provided training and instruction to the patient for proper LE, core and proximal hip recruitment and positioning and eccentric body weight control with ambulation re-education including up and down stairs     Home Exercise Program:    [x] (40246) Reviewed/Progressed HEP activities related to strengthening, flexibility, endurance, ROM of core, proximal hip and LE for functional self-care, mobility, lifting and ambulation/stair navigation            Written HEP est    Access Code: [email or web address]  URL: [email or web address]  Date: 09/06/2022  Prepared by: Divina Bodily    Exercises  Long Sitting Calf Stretch with Strap - 2 x daily - 7 x weekly - 1 sets - 10 reps - 10 hold  Seated Table Hamstring Stretch - 2 x daily - 7 x weekly - 1 sets - 10 reps - 10 hold  Long Sitting Quad Set - 2 x daily - 7 x weekly - 1 sets - 10 reps - 10 hold  Active Straight Leg Raise with Quad Set - 2 x daily - 7 x weekly - 4 sets - 5 reps  Supine Hip Adduction Isometric with Ball - 2 x daily - 7 x weekly - 1 sets - 10 reps - 10 hold  Standing Terminal Knee Extension with Resistance - 2 x daily - 7 x weekly - 1 sets - 10 reps - 10 hold  Seated Knee Flexion AAROM - 2 x daily - 7 x weekly - 1 sets - 10 reps - 10 hold      [] (91337)Reviewed/Progressed HEP activities related to improving balance, coordination, kinesthetic sense, posture, motor skill, proprioception of core, proximal hip and LE for self care, mobility, lifting, and ambulation/stair navigation      Manual Treatments:  PROM / STM / Oscillations-Mobs:  G-I, II, III, IV (PA's, Inf., Post.)  [x] (52983) Provided manual therapy to mobilize LE, proximal hip and/or LS spine soft tissue/joints for the purpose of modulating pain, promoting relaxation,  increasing ROM, reducing/eliminating soft tissue swelling/inflammation/restriction, improving soft tissue extensibility and allowing for proper ROM for normal function with self care, mobility, lifting and ambulation. Modalities:  declined but will do at home   [] GAME READY (VASO)- for significant edema, swelling, pain control.      Charges:  Timed Code Treatment Minutes: 45   Total Treatment Minutes: 45     [] EVAL (LOW) 82773 (typically 20 minutes face-to-face)  [] EVAL (MOD) 25562 (typically 30 minutes face-to-face)  [] EVAL (HIGH) 76772 (typically 45 minutes face-to-face)  [] RE-EVAL   [x] CA(16970) 2x30   [] IONTO  [] NMR (80439) 1x10     [] VASO  [] Manual (40012) x      [] Other:GT using SPC  [x] TA (93503) x      [] Mech Traction (95848)  [] ES(attended) (64915)      [] ES (un) (76286):       GOALS:  Patient stated goal: full function of R knee to return to work  [] Progressing: [] Met: [] Not Met: [] Adjusted     Therapist goals for Patient:   Short Term Goals: To be achieved in: 2 weeks  1. Independent in HEP and progression per patient tolerance, in order to prevent re-injury. [] Progressing: [] Met: [] Not Met: [] Adjusted  2. Patient will have a decrease in pain to facilitate improvement in movement, function, and ADLs as indicated by Functional Deficits. [] Progressing: [] Met: [] Not Met: [] Adjusted     Long Term Goals: To be achieved in: 12 weeks  1. Functional index score of 61 or more for FOTO to assist with reaching prior level of function. [] Progressing: [] Met: [] Not Met: [] Adjusted  2. Patient will demonstrate increased AROM to 0-125 deg to allow for proper joint functioning as indicated by patients Functional Deficits. [] Progressing: [] Met: [] Not Met: [] Adjusted  3. Patient will demonstrate an increase in Strength to good proximal hip strength and control, within 5lb HHD in LE to allow for proper functional mobility as indicated by patients Functional Deficits. [] Progressing: [] Met: [] Not Met: [] Adjusted  4. Patient will return to 90% functional activities without increased symptoms or restriction. [] Progressing: [] Met: [] Not Met: [] Adjusted  5. Pt will be able to return to work w/ min to no limitations standing 8 hr/day. (patient specific functional goal)    [] Progressing: [] Met: [] Not Met: [] Adjusted    Overall Progression Towards Functional goals/ Treatment Progress Update:  [] Patient is progressing as expected towards functional goals listed. [] Progression is slowed due to complexities/Impairments listed. [] Progression has been slowed due to co-morbidities.   [x] Plan just implemented, too soon to assess goals progression <30days   [] Goals require adjustment due to lack of progress  [] Patient is not progressing as expected and requires additional follow up with physician  [] Other    ASSESSMENT:  Pt is very apprehensive about moving her knee into both ext and flex. Quad contraction was more difficult today for unknown cause. More cuing given w/ all exercises to improve quad contraction with standing activities. Pt was stiff and awkward when performing steps forward and lateral but loosened up w/ repetition. Therapy focused on function and to prepare knee for manipulation on Friday. Treatment/Activity Tolerance:  [] Patient tolerated treatment well [] Patient limited by fatique  [x] Patient limited by pain  [] Patient limited by other medical complications  [] Other:     Prognosis: [x] Good [] Fair  [] Poor    Patient Requires Follow-up: [x] Yes  [] No    PLAN: Cont therapy for rehab after R TKA. Cont therapy after MD appt on Monday. [x] Continue per plan of care [] Alter current plan (see comments)  [x] Plan of care initiated [] Hold pending MD visit [] Discharge    Electronically signed by: Batsheva Ratliff PT, MS, OMT-C    Physical Therapist [id number] [address] license #[id number]  Physical Therapist [place] license #[id number]    Note: If patient does not return for scheduled/ recommended follow up visits, this note will serve as a discharge from care along with most recent update on progress.

## 2022-09-28 ENCOUNTER — TELEPHONE (OUTPATIENT)
Dept: ORTHOPEDIC SURGERY | Age: 72
End: 2022-09-28

## 2022-09-28 DIAGNOSIS — M17.11 ARTHRITIS OF RIGHT KNEE: ICD-10-CM

## 2022-09-28 RX ORDER — OXYCODONE HYDROCHLORIDE 5 MG/1
5-10 TABLET ORAL EVERY 6 HOURS PRN
Qty: 40 TABLET | Refills: 0 | Status: SHIPPED | OUTPATIENT
Start: 2022-09-28 | End: 2022-10-03

## 2022-09-29 ENCOUNTER — HOSPITAL ENCOUNTER (OUTPATIENT)
Dept: PHYSICAL THERAPY | Age: 72
Setting detail: THERAPIES SERIES
Discharge: HOME | End: 2022-09-29
Payer: MEDICARE

## 2022-09-29 ENCOUNTER — ANESTHESIA EVENT (OUTPATIENT)
Dept: OPERATING ROOM | Age: 72
End: 2022-09-29
Payer: MEDICARE

## 2022-09-29 PROCEDURE — 97112 NEUROMUSCULAR REEDUCATION: CPT | Performed by: PHYSICAL THERAPIST

## 2022-09-29 PROCEDURE — 97110 THERAPEUTIC EXERCISES: CPT | Performed by: PHYSICAL THERAPIST

## 2022-09-29 PROCEDURE — 97530 THERAPEUTIC ACTIVITIES: CPT | Performed by: PHYSICAL THERAPIST

## 2022-09-29 NOTE — FLOWSHEET NOTE
[facility] [place]. [address] [place] [address]  Phone: [phone]   Fax: [phone]    Physical Therapy Treatment Note/ Progress Report:     Date:  2022  Patient Name:  Vern Deleon    :  1950  MRN: [id number]  Restrictions/Precautions:  hx of cancer; recent surg  Medical/Treatment Diagnosis Information:  Diagnosis: M17.11 (ICD-10-CM) - Primary osteoarthritis of right knee   Treatment Diagnosis: decreased abilty to ambualte and function   DOS 22   R TKA  Insurance/Certification information:   [facility] Medicare  Physician Information:   Dr. Apryl Kim  Has the plan of care been signed (Y/N):        []  Yes  [x]  No     Date of Patient follow up with Physician: 22      Is this a Progress Report:     []  Yes  [x]  No        If Yes:  Date Range for reporting period:  Beginning 22  Ending    Progress report will be due (10 Rx or 30 days whichever is less): 20       Recertification will be due (POC Duration  / 90 days whichever is less): 10/6/22         Visit # Insurance Allowable Auth Required   5 BMN [x]  Yes []  No        Functional Scale: FOTO 31; LEFS 17.7    Date assessed:  22      Latex Allergy:  [x]NO      []YES  Preferred Language for Healthcare:   [x]English       []other:    Pain level:  5-6/10     SUBJECTIVE:  5+ weeks s/p surg  Pt had MD appt on 22. Due to pt's persistent tightness in knee motion, pt has been scheduled for R knee manipulation on Friday, 22. Pt is frustrated w/ outcome. She has been walking more w/ SPC w/o problems. Pt is taking pain medication prior to PT only. She slept last night w/o disturbances.      RTW scheduled for     OBJECTIVE: See eval  Observation: incision healing well but adhesions noted ;  Girth measurements: L 43 cm @ joint line; 45 cm @ superior patella; R 47 cm @ joint line; 48 cm @ superior patella;  9/15/22 R 46 cm @ joint line; 48 cm @ superior patella;  9/22/22  R 46 cm @ joint line; 47 cm @ superior patella;     Palpation: hypersensitive/tender around knee joint generally into lower leg;     Functional Mobility/Transfers: ind but slow and cautious w/ R LE for transfers;     Posture: ant tilt , increased lumbar lordosis, genu valgus noted     Gait- ambulates with min limp noted using SPC, decreased stance phase and shortened stride length;              PROM AROM     L R L R   Hip Flexion 110 100 100 100   Knee Flexion 120 120 120 105 @ on floor  (110 pre surg)   Knee Extension 0 -15 0 -0 (-20 pre surg)         9/6/22 pre surg measurements  Strength (0-5) Left Right   Hip Flexion - seated 5 4   Hip Abduction - sidelyling 5 4   Hip Extension 5 4   Quads 27# 9/6/22 Poor (17#)   Hamstrings 5 4   Ankle DF 5 4   Ankle PF 5 4           Flexibility       Hamstrings (90/90) -10 -15   ITB (Maicol) tight tight   Quads (Ely's) tighit 90   Hip Flexor (Jerry) tight tight           Girth       Mid patella 44 45   Suprapatellar 48 49      Test measurements:      RESTRICTIONS/PRECAUTIONS: colon cancer; recent R TKA    Exercises/Interventions:   Exercise/Equipment Resistance/Repetitions Other comments   Cardio/Warm-up     Bike     Treadmill     NuStep X5 min warm up    Stretching     Hamstring Seated 10:\" x10    Hip Flexion     ITB     Grion     Quad     Inclined Calf 10\"x10    Towel Pull         ROM     Passive     Active Assisted at side of table     Rolling on SB using strap 10\"x10     Prone stretch w/ towel 10\"x10    Weight Shift Onto step 10\"x10    Weight Hangs     Sheet Pulls     Ankle Pumps           Patellar Glides     Medial     Superior     Inferior          STRENGTH     SLR     Supine 3x10 over foam roll Cuing needed for contraction   Standing hip ext 2# 3x10    Abduction    Standing abd 2# 3x10    Adducton PS 10\"x10+ bridge    SLR+          Isometrics     Quad sets 10\" x10 over foam roll         CKC     Calf raises x30    Wall sits     Step ups 4\" x15     4\" x15 1 leg stand     Squatting     CC TKE Blue TB 10\"x10    Balance     Fitter 2'    PRE     Extension RANGE:   Flexion RANGE:   Leg Press RANGE:        Cable Column     Ed on POC, expectations and goals X10'; 9/2922 reviewed goals of therapy for after manipulation tomorrow         Manual/Modalities     Fluid movement and scar massage X10'; ed to continue this to loosen muscles prior to manipulation           Therapeutic Exercise and NMR EXR  [x] (06138) Provided verbal/tactile cueing for activities related to strengthening, flexibility, endurance, ROM for improvements in LE, proximal hip, and core control with self care, mobility, lifting, ambulation. [x] (13080) Provided verbal/tactile cueing for activities related to improving balance, coordination, kinesthetic sense, posture, motor skill, proprioception  to assist with LE, proximal hip, and core control in self care, mobility, lifting, ambulation and eccentric single leg control. NMR and Therapeutic Activities:    [x] (00917 or 92162) Provided verbal/tactile cueing for activities related to improving balance, coordination, kinesthetic sense, posture, motor skill, proprioception and motor activation to allow for proper function of core, proximal hip and LE with self care and ADLs  [] (13563) Gait Re-education- Provided training and instruction to the patient for proper LE, core and proximal hip recruitment and positioning and eccentric body weight control with ambulation re-education including up and down stairs     Home Exercise Program:    [x] (23644) Reviewed/Progressed HEP activities related to strengthening, flexibility, endurance, ROM of core, proximal hip and LE for functional self-care, mobility, lifting and ambulation/stair navigation            Written HEP est    Access Code: [email or web address]  URL: [email or web address]  Date: 09/06/2022  Prepared by: Arabella Stevens    Exercises  Long Sitting Calf Stretch with Strap - 2 x daily - 7 x weekly - 1 sets - 10 reps - 10 hold  Seated Table Hamstring Stretch - 2 x daily - 7 x weekly - 1 sets - 10 reps - 10 hold  Long Sitting Quad Set - 2 x daily - 7 x weekly - 1 sets - 10 reps - 10 hold  Active Straight Leg Raise with Quad Set - 2 x daily - 7 x weekly - 4 sets - 5 reps  Supine Hip Adduction Isometric with Ball - 2 x daily - 7 x weekly - 1 sets - 10 reps - 10 hold  Standing Terminal Knee Extension with Resistance - 2 x daily - 7 x weekly - 1 sets - 10 reps - 10 hold  Seated Knee Flexion AAROM - 2 x daily - 7 x weekly - 1 sets - 10 reps - 10 hold      [] (47569)Reviewed/Progressed HEP activities related to improving balance, coordination, kinesthetic sense, posture, motor skill, proprioception of core, proximal hip and LE for self care, mobility, lifting, and ambulation/stair navigation      Manual Treatments:  PROM / STM / Oscillations-Mobs:  G-I, II, III, IV (PA's, Inf., Post.)  [x] (84877) Provided manual therapy to mobilize LE, proximal hip and/or LS spine soft tissue/joints for the purpose of modulating pain, promoting relaxation,  increasing ROM, reducing/eliminating soft tissue swelling/inflammation/restriction, improving soft tissue extensibility and allowing for proper ROM for normal function with self care, mobility, lifting and ambulation. Modalities:  declined but will do at home   [] GAME READY (VASO)- for significant edema, swelling, pain control.      Charges:  Timed Code Treatment Minutes: 45   Total Treatment Minutes: 45     [] EVAL (LOW) 96132 (typically 20 minutes face-to-face)  [] EVAL (MOD) 52585 (typically 30 minutes face-to-face)  [] EVAL (HIGH) 81761 (typically 45 minutes face-to-face)  [] RE-EVAL   [x] YM(38180) 1x20   [] IONTO  [x] NMR (06978) 1x10     [] VASO  [] Manual (47777) x      [] Other:GT using SPC  [x] TA (08169) x 15     [] Mech Traction (14703)  [] ES(attended) (05799)      [] ES (un) (65932):       GOALS:  Patient stated goal: full function of R knee to return to work  [] Progressing: [] Met: [] Not Met: [] Adjusted     Therapist goals for Patient:   Short Term Goals: To be achieved in: 2 weeks  1. Independent in HEP and progression per patient tolerance, in order to prevent re-injury. [] Progressing: [] Met: [] Not Met: [] Adjusted  2. Patient will have a decrease in pain to facilitate improvement in movement, function, and ADLs as indicated by Functional Deficits. [] Progressing: [] Met: [] Not Met: [] Adjusted     Long Term Goals: To be achieved in: 12 weeks  1. Functional index score of 61 or more for FOTO to assist with reaching prior level of function. [] Progressing: [] Met: [] Not Met: [] Adjusted  2. Patient will demonstrate increased AROM to 0-125 deg to allow for proper joint functioning as indicated by patients Functional Deficits. [] Progressing: [] Met: [] Not Met: [] Adjusted  3. Patient will demonstrate an increase in Strength to good proximal hip strength and control, within 5lb HHD in LE to allow for proper functional mobility as indicated by patients Functional Deficits. [] Progressing: [] Met: [] Not Met: [] Adjusted  4. Patient will return to 90% functional activities without increased symptoms or restriction. [] Progressing: [] Met: [] Not Met: [] Adjusted  5. Pt will be able to return to work w/ min to no limitations standing 8 hr/day. (patient specific functional goal)    [] Progressing: [] Met: [] Not Met: [] Adjusted    Overall Progression Towards Functional goals/ Treatment Progress Update:  [] Patient is progressing as expected towards functional goals listed. [] Progression is slowed due to complexities/Impairments listed. [] Progression has been slowed due to co-morbidities.   [x] Plan just implemented, too soon to assess goals progression <30days   [] Goals require adjustment due to lack of progress  [] Patient is not progressing as expected and requires additional follow up with physician  [] Other    ASSESSMENT:  Pt moved a little more smoothly on her own today but still apprehensive about bending and straightening knee. Pt needed training for quad contraction due to delayed onset for SLR. Loosened quad muscle in preparation for manipulation tomorrow. ROM remained similar to prior therapy visits. Cuing given for gt training with quad contraction for knee ext when WBing. Treatment/Activity Tolerance:  [] Patient tolerated treatment well [] Patient limited by fatique  [x] Patient limited by pain  [] Patient limited by other medical complications  [] Other:     Prognosis: [x] Good [] Fair  [] Poor    Patient Requires Follow-up: [x] Yes  [] No    PLAN: Cont therapy after manipulation tomorrow w/ focus on motion. [x] Continue per plan of care [] Alter current plan (see comments)  [x] Plan of care initiated [] Hold pending MD visit [] Discharge    Electronically signed by: Charmaine Loving, PT, MS, OMT-C    Physical Therapist [id number] [address] license #[id number]  Physical Therapist [place] license #[id number]    Note: If patient does not return for scheduled/ recommended follow up visits, this note will serve as a discharge from care along with most recent update on progress.

## 2022-09-30 ENCOUNTER — HOSPITAL ENCOUNTER (OUTPATIENT)
Dept: PHYSICAL THERAPY | Age: 72
Setting detail: THERAPIES SERIES
Discharge: HOME | End: 2022-09-30
Payer: MEDICARE

## 2022-09-30 ENCOUNTER — ANESTHESIA (OUTPATIENT)
Dept: OPERATING ROOM | Age: 72
End: 2022-09-30
Payer: MEDICARE

## 2022-09-30 ENCOUNTER — HOSPITAL ENCOUNTER (OUTPATIENT)
Age: 72
Setting detail: OUTPATIENT SURGERY
Discharge: HOME | End: 2022-09-30
Attending: ORTHOPAEDIC SURGERY | Admitting: ORTHOPAEDIC SURGERY
Payer: MEDICARE

## 2022-09-30 VITALS
HEART RATE: 69 BPM | HEIGHT: 67 IN | TEMPERATURE: 97.3 F | RESPIRATION RATE: 16 BRPM | BODY MASS INDEX: 28.91 KG/M2 | SYSTOLIC BLOOD PRESSURE: 122 MMHG | OXYGEN SATURATION: 99 % | WEIGHT: 184.2 LBS | DIASTOLIC BLOOD PRESSURE: 69 MMHG

## 2022-09-30 PROCEDURE — 2709999900 HC NON-CHARGEABLE SUPPLY: Performed by: ORTHOPAEDIC SURGERY

## 2022-09-30 PROCEDURE — 7100000001 HC PACU RECOVERY - ADDTL 15 MIN: Performed by: ORTHOPAEDIC SURGERY

## 2022-09-30 PROCEDURE — 97110 THERAPEUTIC EXERCISES: CPT

## 2022-09-30 PROCEDURE — 27570 FIXATION OF KNEE JOINT: CPT | Performed by: ORTHOPAEDIC SURGERY

## 2022-09-30 PROCEDURE — 7100000000 HC PACU RECOVERY - FIRST 15 MIN: Performed by: ORTHOPAEDIC SURGERY

## 2022-09-30 PROCEDURE — 3600000002 HC SURGERY LEVEL 2 BASE: Performed by: ORTHOPAEDIC SURGERY

## 2022-09-30 PROCEDURE — 6370000000 HC RX 637 (ALT 250 FOR IP): Performed by: ANESTHESIOLOGY

## 2022-09-30 PROCEDURE — 2580000003 HC RX 258: Performed by: ANESTHESIOLOGY

## 2022-09-30 PROCEDURE — 97016 VASOPNEUMATIC DEVICE THERAPY: CPT

## 2022-09-30 PROCEDURE — 3700000000 HC ANESTHESIA ATTENDED CARE: Performed by: ORTHOPAEDIC SURGERY

## 2022-09-30 PROCEDURE — 97140 MANUAL THERAPY 1/> REGIONS: CPT

## 2022-09-30 PROCEDURE — 6360000002 HC RX W HCPCS: Performed by: NURSE ANESTHETIST, CERTIFIED REGISTERED

## 2022-09-30 PROCEDURE — 7100000011 HC PHASE II RECOVERY - ADDTL 15 MIN: Performed by: ORTHOPAEDIC SURGERY

## 2022-09-30 PROCEDURE — 7100000010 HC PHASE II RECOVERY - FIRST 15 MIN: Performed by: ORTHOPAEDIC SURGERY

## 2022-09-30 RX ORDER — FENTANYL CITRATE 50 UG/ML
INJECTION, SOLUTION INTRAMUSCULAR; INTRAVENOUS PRN
Status: DISCONTINUED | OUTPATIENT
Start: 2022-09-30 | End: 2022-09-30 | Stop reason: SDUPTHER

## 2022-09-30 RX ORDER — SODIUM CHLORIDE 0.9 % (FLUSH) 0.9 %
5-40 SYRINGE (ML) INJECTION EVERY 12 HOURS SCHEDULED
Status: DISCONTINUED | OUTPATIENT
Start: 2022-09-30 | End: 2022-09-30 | Stop reason: HOSPADM

## 2022-09-30 RX ORDER — SODIUM CHLORIDE 9 MG/ML
INJECTION, SOLUTION INTRAVENOUS CONTINUOUS
Status: DISCONTINUED | OUTPATIENT
Start: 2022-09-30 | End: 2022-09-30 | Stop reason: HOSPADM

## 2022-09-30 RX ORDER — SODIUM CHLORIDE 9 MG/ML
INJECTION, SOLUTION INTRAVENOUS PRN
Status: DISCONTINUED | OUTPATIENT
Start: 2022-09-30 | End: 2022-09-30 | Stop reason: HOSPADM

## 2022-09-30 RX ORDER — PROPOFOL 10 MG/ML
INJECTION, EMULSION INTRAVENOUS PRN
Status: DISCONTINUED | OUTPATIENT
Start: 2022-09-30 | End: 2022-09-30 | Stop reason: SDUPTHER

## 2022-09-30 RX ORDER — FENTANYL CITRATE 50 UG/ML
25 INJECTION, SOLUTION INTRAMUSCULAR; INTRAVENOUS EVERY 5 MIN PRN
Status: DISCONTINUED | OUTPATIENT
Start: 2022-09-30 | End: 2022-09-30 | Stop reason: HOSPADM

## 2022-09-30 RX ORDER — ONDANSETRON 2 MG/ML
4 INJECTION INTRAMUSCULAR; INTRAVENOUS
Status: DISCONTINUED | OUTPATIENT
Start: 2022-09-30 | End: 2022-09-30 | Stop reason: HOSPADM

## 2022-09-30 RX ORDER — SODIUM CHLORIDE 0.9 % (FLUSH) 0.9 %
5-40 SYRINGE (ML) INJECTION PRN
Status: DISCONTINUED | OUTPATIENT
Start: 2022-09-30 | End: 2022-09-30 | Stop reason: HOSPADM

## 2022-09-30 RX ORDER — OXYCODONE HYDROCHLORIDE 5 MG/1
5 TABLET ORAL
Status: COMPLETED | OUTPATIENT
Start: 2022-09-30 | End: 2022-09-30

## 2022-09-30 RX ADMIN — SODIUM CHLORIDE: 9 INJECTION, SOLUTION INTRAVENOUS at 06:37

## 2022-09-30 RX ADMIN — OXYCODONE 5 MG: 5 TABLET ORAL at 08:28

## 2022-09-30 RX ADMIN — PROPOFOL 200 MG: 10 INJECTION, EMULSION INTRAVENOUS at 07:39

## 2022-09-30 RX ADMIN — FENTANYL CITRATE 25 MCG: 50 INJECTION INTRAMUSCULAR; INTRAVENOUS at 07:38

## 2022-09-30 ASSESSMENT — PAIN DESCRIPTION - DESCRIPTORS
DESCRIPTORS: DISCOMFORT

## 2022-09-30 ASSESSMENT — PAIN - FUNCTIONAL ASSESSMENT
PAIN_FUNCTIONAL_ASSESSMENT: ACTIVITIES ARE NOT PREVENTED
PAIN_FUNCTIONAL_ASSESSMENT: 0-10

## 2022-09-30 ASSESSMENT — PAIN SCALES - GENERAL
PAINLEVEL_OUTOF10: 6
PAINLEVEL_OUTOF10: 7
PAINLEVEL_OUTOF10: 7

## 2022-09-30 ASSESSMENT — PAIN DESCRIPTION - ONSET
ONSET: ON-GOING

## 2022-09-30 ASSESSMENT — PAIN DESCRIPTION - ORIENTATION
ORIENTATION: RIGHT

## 2022-09-30 ASSESSMENT — PAIN DESCRIPTION - LOCATION
LOCATION: NECK

## 2022-09-30 ASSESSMENT — PAIN DESCRIPTION - FREQUENCY
FREQUENCY: CONTINUOUS
FREQUENCY: INTERMITTENT
FREQUENCY: CONTINUOUS
FREQUENCY: INTERMITTENT

## 2022-09-30 ASSESSMENT — PAIN DESCRIPTION - PAIN TYPE
TYPE: CHRONIC PAIN
TYPE: ACUTE PAIN
TYPE: SURGICAL PAIN
TYPE: CHRONIC PAIN

## 2022-09-30 NOTE — PROGRESS NOTES
Pt alert and oriented, vitals stable on room air. Pt reports pain in neck- started yesterday (7/10). Pt notified pain pill available next door if pain continues. Full movement and sensation reported in BLE. No pain in right knee. Ice pack on right knee.

## 2022-09-30 NOTE — FLOWSHEET NOTE
[facility] [address]  Phone: [phone]   Fax: [phone]    Physical Therapy Treatment Note/ Progress Report:     Date:  2022  Patient Name:  Ella Colvin    :  1950  MRN: [id number]  Restrictions/Precautions:  hx of cancer; recent surg  Medical/Treatment Diagnosis Information:  Diagnosis: M17.11 (ICD-10-CM) - Primary osteoarthritis of right knee   Treatment Diagnosis: decreased abilty to ambualte and function   DOS 22   R TKA  Insurance/Certification information:   [facility] [employer] [facility] Medicare  Physician Information:   Dr. Glenis Bills  Has the plan of care been signed (Y/N):        []  Yes  [x]  No     Date of Patient follow up with Physician: 22      Is this a Progress Report:     []  Yes  [x]  No        If Yes:  Date Range for reporting period:  Beginning 22  Ending    Progress report will be due (10 Rx or 30 days whichever is less):        Recertification will be due (POC Duration  / 90 days whichever is less): 10/6/22         Visit # Insurance Allowable Auth Required   6 BMN [x]  Yes []  No        Functional Scale: FOTO 31; LEFS 17.7    Date assessed:  22      Latex Allergy:  [x]NO      []YES  Preferred Language for Healthcare:   [x]English       []other:    Pain level:  not rated /10     SUBJECTIVE:  5+ weeks s/p surg  Pt had ROBERT earlier this morning. Here by herself, amb w/ cane; states \"my  may have gone home, I'm not sure. \" Pt seems slightly confused; unsure as I haven't seen her before and don't know her baseline. Per ortho notes, pre ROBERT, ROM was 0-95*. No formal measurement from ROBERT in record, but photo was obtained during ROBERT showed just a couple inches between calf and thigh to demonstrate knee flexion ROM. She was given pain meds.      RTW scheduled for     OBJECTIVE: See eval  Observation: incision healing well but adhesions noted;  Girth measurements: L 43 cm @ joint line; 45 cm @ superior patella; R 47 cm @ joint line; 48 cm @ superior patella;  9/15/22 R 46 cm @ joint line; 48 cm @ superior patella;  9/22/22  R 46 cm @ joint line; 47 cm @ superior patella;     Palpation: hypersensitive/tender around knee joint generally into lower leg;     Functional Mobility/Transfers: ind but slow and cautious w/ R LE for transfers;     Posture: ant tilt , increased lumbar lordosis, genu valgus noted     Gait- ambulates with min limp noted using SPC, decreased stance phase and shortened stride length;               PROM AROM 9/30     L R L R    Hip Flexion 110 100 100 100    Knee Flexion 120 120 120 105 @ on floor  (110 pre surg) (Supine) AAROM  L= 130*  R= 90-95*    Knee Extension 0 -15 0 -0 (-20 pre surg) Bilat lacks approx 5*         9/6/22 pre surg measurements  Strength (0-5) Left Right   Hip Flexion - seated 5 4   Hip Abduction - sidelyling 5 4   Hip Extension 5 4   Quads 27# 9/6/22 Poor (17#)   Hamstrings 5 4   Ankle DF 5 4   Ankle PF 5 4           Flexibility       Hamstrings (90/90) -10 -15   ITB (Maicol) tight tight   Quads (Ely's) tighit 90   Hip Flexor (Jerry) tight tight           Girth       Mid patella 44 45   Suprapatellar 48 49      Test measurements:      RESTRICTIONS/PRECAUTIONS: colon cancer; recent R TKA    Exercises/Interventions:   Exercise/Equipment Resistance/Repetitions Other comments   Cardio/Warm-up     Bike     Treadmill     NuStep    Stretching     Hamstring    Hip Flexion     ITB     Grion     Quad     Inclined Calf    Towel Pull         ROM     Passive     Active Assisted at side of table    AAROM Seated static progressive str  30s x 3, 4 sets Beginning and end of session; goals to achieve symmetry           Weight Shift    Weight Hangs     Sheet Pulls     Heel slide Supine w/ belt 10s hold x 15 ROM 0-90*   Ankle Pumps           Patellar Glides     Medial     Superior     Inferior          STRENGTH     SLR     Supine Cuing needed for contraction   Standing hip ext Abduction    Standing abd    Adducton    SLR+          Isometrics     Quad sets 10s x 5 Supine bilat        CKC     Calf raises    Wall sits     Step ups        1 leg stand     Squatting     CC TKE    Balance        PRE     Extension RANGE:   Flexion RANGE:   Leg Press RANGE:        Cable Column     Ed on POC, expectations and goals 9/30; goals of ROBERT, role of progressive static mobility work to achieve maximal functional ROM         Manual/Modalities     Fluid movement and scar massage    manual PROM w/ OP, PA tibial mobs, HS str, quad STM    vaso 10' low compression Legs elevated     Therapeutic Exercise and NMR EXR  [x] (79782) Provided verbal/tactile cueing for activities related to strengthening, flexibility, endurance, ROM for improvements in LE, proximal hip, and core control with self care, mobility, lifting, ambulation. [x] (78935) Provided verbal/tactile cueing for activities related to improving balance, coordination, kinesthetic sense, posture, motor skill, proprioception  to assist with LE, proximal hip, and core control in self care, mobility, lifting, ambulation and eccentric single leg control.      NMR and Therapeutic Activities:    [x] (80366 or 48569) Provided verbal/tactile cueing for activities related to improving balance, coordination, kinesthetic sense, posture, motor skill, proprioception and motor activation to allow for proper function of core, proximal hip and LE with self care and ADLs  [] (12846) Gait Re-education- Provided training and instruction to the patient for proper LE, core and proximal hip recruitment and positioning and eccentric body weight control with ambulation re-education including up and down stairs     Home Exercise Program:    [x] (26301) Reviewed/Progressed HEP activities related to strengthening, flexibility, endurance, ROM of core, proximal hip and LE for functional self-care, mobility, lifting and ambulation/stair navigation            Written HEP est    Access Code: [email or web address]  URL: [email or web address]  Date: 09/06/2022  Prepared by: Elmer Hook    Exercises  Long Sitting Calf Stretch with Strap - 2 x daily - 7 x weekly - 1 sets - 10 reps - 10 hold  Seated Table Hamstring Stretch - 2 x daily - 7 x weekly - 1 sets - 10 reps - 10 hold  Long Sitting Quad Set - 2 x daily - 7 x weekly - 1 sets - 10 reps - 10 hold  Active Straight Leg Raise with Quad Set - 2 x daily - 7 x weekly - 4 sets - 5 reps  Supine Hip Adduction Isometric with Ball - 2 x daily - 7 x weekly - 1 sets - 10 reps - 10 hold  Standing Terminal Knee Extension with Resistance - 2 x daily - 7 x weekly - 1 sets - 10 reps - 10 hold  Seated Knee Flexion AAROM - 2 x daily - 7 x weekly - 1 sets - 10 reps - 10 hold      [] (75902)Reviewed/Progressed HEP activities related to improving balance, coordination, kinesthetic sense, posture, motor skill, proprioception of core, proximal hip and LE for self care, mobility, lifting, and ambulation/stair navigation      Manual Treatments:  PROM / STM / Oscillations-Mobs:  G-I, II, III, IV (PA's, Inf., Post.)  [x] (43447) Provided manual therapy to mobilize LE, proximal hip and/or LS spine soft tissue/joints for the purpose of modulating pain, promoting relaxation,  increasing ROM, reducing/eliminating soft tissue swelling/inflammation/restriction, improving soft tissue extensibility and allowing for proper ROM for normal function with self care, mobility, lifting and ambulation. Modalities:  declined but will do at home   [] GAME READY (VASO)- for significant edema, swelling, pain control.      Charges:  Timed Code Treatment Minutes: 35   Total Treatment Minutes: 45     [] EVAL (LOW) 91111 (typically 20 minutes face-to-face)  [] EVAL (MOD) 65954 (typically 30 minutes face-to-face)  [] EVAL (HIGH) 56130 (typically 45 minutes face-to-face)  [] RE-EVAL   [x] RA(42569)  x   [] IONTO  [] NMR (40917) x    [x] VASO  [x] Manual (14696) x      [] Other:GT using SPC  [] TA (03022) x    [] [facility] Traction (96443)  [] ES(attended) (29800)      [] ES (un) (92986):       GOALS:  Patient stated goal: full function of R knee to return to work  [] Progressing: [] Met: [] Not Met: [] Adjusted     Therapist goals for Patient:   Short Term Goals: To be achieved in: 2 weeks  1. Independent in HEP and progression per patient tolerance, in order to prevent re-injury. [] Progressing: [] Met: [] Not Met: [] Adjusted  2. Patient will have a decrease in pain to facilitate improvement in movement, function, and ADLs as indicated by Functional Deficits. [] Progressing: [] Met: [] Not Met: [] Adjusted     Long Term Goals: To be achieved in: 12 weeks  1. Functional index score of 61 or more for FOTO to assist with reaching prior level of function. [] Progressing: [] Met: [] Not Met: [] Adjusted  2. Patient will demonstrate increased AROM to 0-125 deg to allow for proper joint functioning as indicated by patients Functional Deficits. [] Progressing: [] Met: [] Not Met: [] Adjusted  3. Patient will demonstrate an increase in Strength to good proximal hip strength and control, within 5lb HHD in LE to allow for proper functional mobility as indicated by patients Functional Deficits. [] Progressing: [] Met: [] Not Met: [] Adjusted  4. Patient will return to 90% functional activities without increased symptoms or restriction. [] Progressing: [] Met: [] Not Met: [] Adjusted  5. Pt will be able to return to work w/ min to no limitations standing 8 hr/day. (patient specific functional goal)    [] Progressing: [] Met: [] Not Met: [] Adjusted    Overall Progression Towards Functional goals/ Treatment Progress Update:  [] Patient is progressing as expected towards functional goals listed. [] Progression is slowed due to complexities/Impairments listed. [] Progression has been slowed due to co-morbidities.   [x] Plan just implemented, too soon to assess goals progression <30days   [] Goals require adjustment due to lack of progress  [] Patient is not progressing as expected and requires additional follow up with physician  [] Other    ASSESSMENT:  ROM appears similar as pre ROBERT for now. Pt did report easier time getting into car. Lots of discussion about performing low load progressive static stretch to prevent formation of fibrosis. Needs cues to hold stretches for adequate time and adequate intensity. Some guarding during PROM, but able to tolerate fairly well. Overall pain levels were similar or better at end of session compared to start of session. Treatment/Activity Tolerance:  [] Patient tolerated treatment well [] Patient limited by fatique  [x] Patient limited by pain  [] Patient limited by other medical complications  [] Other:     Prognosis: [x] Good [] Fair  [] Poor    Patient Requires Follow-up: [x] Yes  [] No    PLAN: Cont therapy after manipulation tomorrow w/ focus on motion. [x] Continue per plan of care [] Alter current plan (see comments)  [x] Plan of care initiated [] Hold pending MD visit [] Discharge    Electronically signed by: Julissa Castro, PT, , DPT  #[id number]      Note: If patient does not return for scheduled/ recommended follow up visits, this note will serve as a discharge from care along with most recent update on progress.

## 2022-09-30 NOTE — PROGRESS NOTES
9403 pt arrived from OR, sedate with OPA present. Vitals stable on 3L NC. Ice pack applied to right knee. Bilateral pedal pulses 2+. Pt quickly arouses to voice, OPA removed by BODØ, CRNA. Pt denies pain, placed on room air.

## 2022-09-30 NOTE — ANESTHESIA PRE PROCEDURE
[facility] Department of Anesthesiology  Pre-Anesthesia Evaluation/Consultation       Name:  Steven Hidalgo  : 1950  Age:  66 y.o.                                            MRN:  [id number]  Date: 2022           Surgeon: Surgeon(s):  Angella Victoria MD    Procedure: Procedure(s):  RIGHT KNEE MANIPULATION     Allergies   Allergen Reactions    Sulfa Antibiotics Rash     As a child  Other reaction(s): Rash     Patient Active Problem List   Diagnosis    Mixed hyperlipidemia    Colon polyp    Essential hypertension    Class 1 obesity due to excess calories without serious comorbidity with body mass index (BMI) of 33.0 to 33.9 in adult    History of colon cancer, stage I    Benign neoplasm of skin of face    Ganglion    Hypertrophic and atrophic condition of skin    Lumbar stenosis    Vitamin D deficiency    Family history of type 2 diabetes mellitus    Carotid artery calcification, bilateral    Prediabetes    [facility]rosclerosis of abdominal aorta ([facility] [address]    RUQ pain    Primary osteoarthritis of right knee    Arthritis of right knee     Past Medical History:   Diagnosis Date    Arthritis     Colon cancer ([facility] [address]     Colon polyp 10/22/2012    Essential hypertension 2015    Full dentures     Hyperlipidemia 2011    Mixed hyperlipidemia 2011    Spinal stenosis 2022     Past Surgical History:   Procedure Laterality Date    APPENDECTOMY      \"came out with colon surgery\"    COLECTOMY  2003    COLONOSCOPY      2017    JOINT REPLACEMENT Right 2022    TOTAL KNEE ARTHROPLASTY Right 2022    RIGHT TOTAL KNEE REPLACEMENT ROBOTIC ASSISTED performed by Angella [facility] at [facility] History     Tobacco Use    Smoking status: Former     Packs/day: 1.00     Years: 6.00     Pack years: 6.00     Types: Cigarettes     Quit date:      Years since quittin.7    Smokeless tobacco: Never   Vaping Use    Vaping Use: Never used   Substance Use Topics    Alcohol use: No    Drug use: No     Medications  No current facility-administered medications on file prior to encounter. Current Outpatient Medications on File Prior to Encounter   Medication Sig Dispense Refill    tiZANidine (ZANAFLEX) 4 MG tablet Take 1 tablet by mouth 3 times daily 90 tablet 0    apixaban (ELIQUIS) 5 MG TABS tablet Take 2 tablets by mouth 2 times daily for 7 days No NSAID's. 28 tablet 0    rosuvastatin (CRESTOR) 20 MG tablet TAKE 1 TABLET BY MOUTH DAILY (CANCEL PRAVASTATIN) 90 tablet 0    aspirin 81 MG EC tablet Take 1 tablet by mouth 2 times daily for 14 days 28 tablet 0    Ascorbic Acid (VITAMIN C) 250 MG tablet Take 250 mg by mouth in the morning.  vitamin B-12 (CYANOCOBALAMIN) 100 MCG tablet Take 50 mcg by mouth in the morning.  Cholecalciferol (VITAMIN D3) 1.25 MG (78299 UT) CAPS Take by mouth      valACYclovir (VALTREX) 1 g tablet Take 1 tablet by mouth 2 times daily For one week as needed. 42 tablet 1    lisinopril-hydroCHLOROthiazide (PRINZIDE;ZESTORETIC) 20-12.5 MG per tablet Take 1 tablet daily by mouth, okay to separate , if do not have in stock.  90 tablet 1     Current Facility-Administered Medications   Medication Dose Route Frequency Provider Last Rate Last Admin    0.9 % sodium chloride infusion   IntraVENous Continuous Augustina Clifton MD 75 mL/hr at 22 0637 New Bag at 22 0637    sodium chloride flush 0.9 % injection 5-40 mL  5-40 mL IntraVENous 2 times per day Augustina Clifton MD        sodium chloride flush 0.9 % injection 5-40 mL  5-40 mL IntraVENous PRN Augustina Clifton MD        0.9 % sodium chloride infusion   IntraVENous PRN Augustina Clifton MD         Vital Signs (Current)   Vitals:    22 0626   BP: 108/73   Pulse: 88   Resp: 22   Temp: 97.7 °F (36.5 °C)   SpO2: 97%     Vital Signs Statistics (for past 48 hrs)     Temp  Av.7 °F (36.5 °C)  Min: 97.7 °F (36.5 °C)   Min taken time: 22 3108 Max: 97.7 °F (36.5 °C)   Max taken time: 22  Pulse  Av  Min: 88   Min taken time: 22  Max: 80   Max taken time: 22  Resp  Av  Min: 25   Min taken time: 22  Max: 25   Max taken time: 22  BP  Min: 108/73   Min taken time: 22  Max: 108/73   Max taken time: 22  SpO2  Av %  Min: 97 %   Min taken time: 22  Max: 97 %   Max taken time: 22    BP Readings from Last 3 Encounters:   22 108/73   22 100/60   22 118/65     BMI  Body mass index is 29.29 kg/m². Estimated body mass index is 29.29 kg/m² as calculated from the following:    Height as of this encounter: 5' 6.5\" (1.689 m). Weight as of this encounter: 184 lb 3.2 oz (83.6 kg).     CBC   Lab Results   Component Value Date/Time    WBC 4.3 2022 04:08 PM    RBC 3.77 2022 04:08 PM    HGB 12.1 2022 04:08 PM    HCT 35.2 2022 04:08 PM    MCV 93.5 2022 04:08 PM    RDW 13.0 2022 04:08 PM     2022 04:08 PM     CMP    Lab Results   Component Value Date/Time     2022 04:08 PM    K 4.2 2022 04:08 PM    CL 98 2022 04:08 PM    CO2 27 2022 04:08 PM    BUN 10 2022 04:08 PM    CREATININE 0.7 2022 04:08 PM    GFRAA >60 2022 04:08 PM    GFRAA >60 10/22/2012 11:22 AM    AGRATIO 1.8 02/15/2022 09:38 AM    LABGLOM >60 2022 04:08 PM    GLUCOSE 105 2022 04:08 PM    PROT 6.5 02/15/2022 09:38 AM    PROT 6.9 10/22/2012 11:22 AM    CALCIUM 9.9 2022 04:08 PM    BILITOT 0.4 02/15/2022 09:38 AM    ALKPHOS 78 02/15/2022 09:38 AM    AST 15 02/15/2022 09:38 AM    ALT 11 02/15/2022 09:38 AM     BMP    Lab Results   Component Value Date/Time     2022 04:08 PM    K 4.2 2022 04:08 PM    CL 98 2022 04:08 PM    CO2 27 2022 04:08 PM    BUN 10 2022 04:08 PM    CREATININE 0.7 2022 04:08 PM    CALCIUM 9.9 2022 04:08 PM GFRAA >60 09/12/2022 04:08 PM    GFRAA >60 10/22/2012 11:22 AM    LABGLOM >60 09/12/2022 04:08 PM    GLUCOSE 105 09/12/2022 04:08 PM     POCGlucose  No results for input(s): GLUCOSE in the last 72 hours. [facility]    Lab Results   Component Value Date/Time    PROTIME 13.5 08/08/2022 11:55 AM    INR 1.04 08/08/2022 11:55 AM    APTT 29.4 08/08/2022 11:55 AM     HCG (If Applicable) No results found for: PREGTESTUR, PREGSERUM, HCG, HCGQUANT   ABGs No results found for: PHART, PO2ART, YAA6ZEI, PKH4CBH, BEART, C7VBGARK   Type & Screen (If Applicable)  No results found for: LABABO, LABRH                         BMI: Wt Readings from Last 3 Encounters:       NPO Status:   Date of last liquid consumption: 09/30/22   Time of last liquid consumption: 0500 (SIP WATER WITH MEDS)   Date of last solid food consumption: 09/29/22      Time of last solid consumption: 1600       Anesthesia Evaluation  Patient summary reviewed no history of anesthetic complications:   Airway: Mallampati: III  TM distance: >3 FB   Neck ROM: full  Mouth opening: > = 3 FB   Dental: normal exam         Pulmonary:Negative Pulmonary ROS and normal exam                               Cardiovascular:  Exercise tolerance: good (>4 METS),   (+) hypertension:, CAD:, hyperlipidemia      ECG reviewed  Rhythm: regular  Rate: normal                    Neuro/Psych:   Negative Neuro/Psych ROS              GI/Hepatic/Renal:        (-) GERD       Endo/Other:    (+) DiabetesType II DM, , blood dyscrasia (eliquis): anticoagulation therapy:., malignancy/cancer (Hx colon ca). Abdominal:             Vascular: negative vascular ROS. Other Findings:           Anesthesia Plan      general     ASA 3       Induction: intravenous. MIPS: Postoperative opioids intended and Prophylactic antiemetics administered. Anesthetic plan and risks discussed with patient. Plan discussed with CRNA.                 This pre-anesthesia assessment may be used as a history and physical.    DOS STAFF ADDENDUM:    Pt seen and examined, chart reviewed (including anesthesia, drug and allergy history). No interval changes to history and physical examination. Anesthetic plan, risks, benefits, alternatives, and personnel involved discussed with patient. Questions and concerns addressed. Patient(family) verbalized an understanding and agrees to proceed.       Keyon Walters MD  September 30, 2022  7:33 AM

## 2022-09-30 NOTE — PROGRESS NOTES
Pt tolerates PO. VSS. Pain remains 6/10 in neck. Discharge instructions reviewed with pt and . Both express an understanding of instructions. Up to chair. Pt dressed with 's assistance. Wheeled to 's car for discharge.

## 2022-09-30 NOTE — DISCHARGE INSTRUCTIONS
You had a right knee manipulation today (9/30/2022)  -You may put full weight on your leg.  -Continue working with physical therapy.  -Use ice and elevate your leg to help ease pain and swelling.  -Take pain medications as directed. -Call [phone] for any concerns/questions. Follow-up in the office in 3 weeks.

## 2022-09-30 NOTE — ANESTHESIA POSTPROCEDURE EVALUATION
[facility] Department of Anesthesiology  Post-Anesthesia Note       Name:  Vern Deleon                                  Age:  66 y.o. MRN:  [id number]     Last Vitals & Oxygen Saturation: /69   Pulse 69   Temp 97.3 °F (36.3 °C) (Temporal)   Resp 16   Ht 5' 6.5\" (1.689 m)   Wt 184 lb 3.2 oz (83.6 kg)   SpO2 99%   BMI 29.29 kg/m²   Patient Vitals for the past 4 hrs:   BP Temp Temp src Pulse Resp SpO2 Height Weight   09/30/22 0902 122/69 -- -- 69 16 99 % -- --   09/30/22 0828 -- -- -- -- 14 -- -- --   09/30/22 0817 (!) 148/66 97.3 °F (36.3 °C) Temporal 68 16 100 % -- --   09/30/22 0811 -- -- -- 66 15 97 % -- --   09/30/22 0805 104/61 -- -- 67 13 97 % -- --   09/30/22 0800 107/63 -- -- 69 13 97 % -- --   09/30/22 0755 107/60 -- -- 71 13 98 % -- --   09/30/22 0750 108/66 -- -- 76 18 98 % -- --   09/30/22 0747 (!) 102/57 97.8 °F (36.6 °C) Temporal 71 17 100 % -- --   09/30/22 0626 108/73 97.7 °F (36.5 °C) Temporal 88 22 97 % 5' 6.5\" (1.689 m) 184 lb 3.2 oz (83.6 kg)       Level of consciousness:  Awake, alert    Respiratory: Respirations easy, no distress. Stable. Cardiovascular: Hemodynamically stable. Hydration: Adequate. PONV: Adequately managed. Post-op pain: Adequately controlled. Post-op assessment: Tolerated anesthetic well without complication. Complications:  None.     Dayna Lucero MD  September 30, 2022   9:23 AM

## 2022-09-30 NOTE — H&P
Update History & Physical    The patient's History and Physical of September 30, 2022 was reviewed with the patient and I examined the patient. There was no change. The surgical site was confirmed by the patient and me. Plan: The risks, benefits, expected outcome, and alternative to the recommended procedure have been discussed with the patient. Patient understands and wants to proceed with the procedure.      Electronically signed by Conrad Bruno MD on 9/30/2022 at 7:31 AM

## 2022-09-30 NOTE — OP NOTE
Patient: Jose Meza  YOB: 1950  MRN: [id number]    DATE OF PROCEDURE: 9/30/2022      PREOPERATIVE DIAGNOSIS:  Arthrofibrosis, status post right total knee arthroplasty. POSTOPERATIVE DIAGNOSIS:  Arthrofibrosis, status post right total knee arthroplasty. OPERATION PERFORMED:  Examination under anesthesia and manipulation of right total knee. SURGEON:  Vi Nick MD     ESTIMATED BLOOD LOSS:  Minimal.     INDICATIONS:  The patient is a 66 y.o. female who underwent total knee arthroplasty on 8/17/2022. She struggled with range of motion secondary to pain and now is here for manipulation and examination under anesthesia. There have been no other issues. No signs of sepsis, instability and x-rays look satisfactory. OPERATIVE PROCEDURE:  The patient was brought to the operating room. Once anesthetic was obtained, the patient's knee was manipulated from full extension to near calf-on-thigh flexion. Multiple adhesions were released as the knee was placed in the flexion movement. The knee was bent back-and-forth several times. It remained stable both in the varus-valgus and anterior-posterior planes. The patient was awoken and brought to recovery room in good condition.      Knee flexion after manipulation:

## 2022-09-30 NOTE — PROGRESS NOTES
Pt alert on arrival to phase II. Denies pain in right knee but pain in right neck 7/10. VSS. HOB up. Resting quietly. Given soda and crackers and call light. Called for .

## 2022-10-03 ENCOUNTER — HOSPITAL ENCOUNTER (OUTPATIENT)
Dept: PHYSICAL THERAPY | Age: 72
Setting detail: THERAPIES SERIES
Discharge: HOME | End: 2022-10-03
Payer: MEDICARE

## 2022-10-03 ENCOUNTER — APPOINTMENT (OUTPATIENT)
Dept: PHYSICAL THERAPY | Age: 72
End: 2022-10-03
Payer: MEDICARE

## 2022-10-03 PROCEDURE — 97110 THERAPEUTIC EXERCISES: CPT

## 2022-10-03 PROCEDURE — 97140 MANUAL THERAPY 1/> REGIONS: CPT

## 2022-10-03 NOTE — FLOWSHEET NOTE
[address] [facility] [place] [place] [address]  Phone: [phone]   Fax: [phone]    Physical Therapy Treatment Note/ Progress Report:     Date:  10/3/2022  Patient Name:  Nando Ahmadi    :  1950  MRN: [id number]  Restrictions/Precautions:  hx of cancer; recent surg  Medical/Treatment Diagnosis Information:  Diagnosis: M17.11 (ICD-10-CM) - Primary osteoarthritis of right knee   Treatment Diagnosis: decreased abilty to ambualte and function   DOS 22   R TKA  Insurance/Certification information:   [employer]  Physician Information:   Dr. Darwin Ge  Has the plan of care been signed (Y/N):        []  Yes  [x]  No     Date of Patient follow up with Physician: 22      Is this a Progress Report:     []  Yes  [x]  No        If Yes:  Date Range for reporting period:  Beginning 22  Ending    Progress report will be due (10 Rx or 30 days whichever is less): 49       Recertification will be due (POC Duration  / 90 days whichever is less): 10/6/22         Visit # Insurance Allowable Auth Required   7 BMN [x]  Yes []  No        Functional Scale: FOTO 31; LEFS 17.7    Date assessed:  22      Latex Allergy:  [x]NO      []YES  Preferred Language for Healthcare:   [x]English       []other:    Pain level:  not rated /10     SUBJECTIVE:  5+ weeks s/p surg  Pt had ROBERT earlier this morning. Here by herself, amb w/ cane; states \"my  may have gone home, I'm not sure. \" Pt seems slightly confused; unsure as I haven't seen her before and don't know her baseline. Per ortho notes, pre ROBERT, ROM was 0-95*. No formal measurement from ROBERT in record, but photo was obtained during ROBERT showed just a couple inches between calf and thigh to demonstrate knee flexion ROM. She was given pain meds. 10/3/22: Pt reports knee is swollen and sore today. Sore in the back where knee bends.       RTW scheduled for     OBJECTIVE: See eval  Observation: incision healing well but adhesions noted;  Girth measurements: L 43 cm @ joint line; 45 cm @ superior patella; R 47 cm @ joint line; 48 cm @ superior patella;  9/15/22 R 46 cm @ joint line; 48 cm @ superior patella;  9/22/22  R 46 cm @ joint line; 47 cm @ superior patella;     Palpation: hypersensitive/tender around knee joint generally into lower leg;     Functional Mobility/Transfers: ind but slow and cautious w/ R LE for transfers;     Posture: ant tilt , increased lumbar lordosis, genu valgus noted     Gait- ambulates with min limp noted using SPC, decreased stance phase and shortened stride length;                PROM AROM 9/30 10/3     L R L R     Hip Flexion 110 100 100 100     Knee Flexion 120 120 120 105 @ on floor  (110 pre surg) (Supine) AAROM  L= 130*  R= 90-95*  R =105   Knee Extension 0 -15 0 -0 (-20 pre surg) Bilat lacks approx 5*          9/6/22 pre surg measurements  Strength (0-5) Left Right   Hip Flexion - seated 5 4   Hip Abduction - sidelyling 5 4   Hip Extension 5 4   Quads 27# 9/6/22 Poor (17#)   Hamstrings 5 4   Ankle DF 5 4   Ankle PF 5 4           Flexibility       Hamstrings (90/90) -10 -15   ITB (Maicol) tight tight   Quads (Ely's) tighit 90   Hip Flexor (Jerry) tight tight           Girth       Mid patella 44 45   Suprapatellar 48 49      Test measurements:      RESTRICTIONS/PRECAUTIONS: colon cancer; recent R TKA    Exercises/Interventions:   Exercise/Equipment Resistance/Repetitions Other comments   Cardio/Warm-up     Bike     Treadmill     NuStep X5 min warm up Level 4   Stretching     Hamstring    Hip Flexion     ITB     Grion     Quad     Inclined Calf    Towel Pull         ROM     Passive     Active Assisted at side of table 5\"x15 w/ foot on floor and sliding on board     AAROM Beginning and end of session; goals to achieve symmetry    Rolling on SB using strap 10\"x10     Prone stretch w/ towel 10\"x15     Knee flexion step stretch 30 secx4     AROM  5\" X15 sitting Reviewed/Progressed HEP activities related to strengthening, flexibility, endurance, ROM of core, proximal hip and LE for functional self-care, mobility, lifting and ambulation/stair navigation            Written HEP est    Access Code: [email or web address]  URL: [email or web address]  Date: 09/06/2022  Prepared by: Lidia Prasad  Long Sitting Calf Stretch with Strap - 2 x daily - 7 x weekly - 1 sets - 10 reps - 10 hold  Seated Table Hamstring Stretch - 2 x daily - 7 x weekly - 1 sets - 10 reps - 10 hold  Long Sitting Quad Set - 2 x daily - 7 x weekly - 1 sets - 10 reps - 10 hold  Active Straight Leg Raise with Quad Set - 2 x daily - 7 x weekly - 4 sets - 5 reps  Supine Hip Adduction Isometric with Ball - 2 x daily - 7 x weekly - 1 sets - 10 reps - 10 hold  Standing Terminal Knee Extension with Resistance - 2 x daily - 7 x weekly - 1 sets - 10 reps - 10 hold  Seated Knee Flexion AAROM - 2 x daily - 7 x weekly - 1 sets - 10 reps - 10 hold      [] (94857)Reviewed/Progressed HEP activities related to improving balance, coordination, kinesthetic sense, posture, motor skill, proprioception of core, proximal hip and LE for self care, mobility, lifting, and ambulation/stair navigation      Manual Treatments:  PROM / STM / Oscillations-Mobs:  G-I, II, III, IV (PA's, Inf., Post.)  [x] (75817) Provided manual therapy to mobilize LE, proximal hip and/or LS spine soft tissue/joints for the purpose of modulating pain, promoting relaxation,  increasing ROM, reducing/eliminating soft tissue swelling/inflammation/restriction, improving soft tissue extensibility and allowing for proper ROM for normal function with self care, mobility, lifting and ambulation. Modalities:     [] GAME READY (VASO)- for significant edema, swelling, pain control.      Charges:  Timed Code Treatment Minutes: 36 (8 man, 30 TE)   Total Treatment Minutes: 50     [] EVAL (LOW) 12596 (typically 20 minutes face-to-face)  [] EVAL (MOD) 77216 (typically 30 minutes face-to-face)  [] EVAL (HIGH) 47147 (typically 45 minutes face-to-face)  [] RE-EVAL   [x] MC(33905)  x 2  [] IONTO  [] NMR (63360) x    [] VASO  [x] Manual (34332) x 1     [] Other:GT using SPC  [] TA (88443) x    [] Mech Traction (55275)  [] ES(attended) (63693)      [] ES (un) (36423):       GOALS:  Patient stated goal: full function of R knee to return to work  [] Progressing: [] Met: [] Not Met: [] Adjusted     Therapist goals for Patient:   Short Term Goals: To be achieved in: 2 weeks  1. Independent in HEP and progression per patient tolerance, in order to prevent re-injury. [] Progressing: [] Met: [] Not Met: [] Adjusted  2. Patient will have a decrease in pain to facilitate improvement in movement, function, and ADLs as indicated by Functional Deficits. [] Progressing: [] Met: [] Not Met: [] Adjusted     Long Term Goals: To be achieved in: 12 weeks  1. Functional index score of 61 or more for FOTO to assist with reaching prior level of function. [] Progressing: [] Met: [] Not Met: [] Adjusted  2. Patient will demonstrate increased AROM to 0-125 deg to allow for proper joint functioning as indicated by patients Functional Deficits. [] Progressing: [] Met: [] Not Met: [] Adjusted  3. Patient will demonstrate an increase in Strength to good proximal hip strength and control, within 5lb HHD in LE to allow for proper functional mobility as indicated by patients Functional Deficits. [] Progressing: [] Met: [] Not Met: [] Adjusted  4. Patient will return to 90% functional activities without increased symptoms or restriction. [] Progressing: [] Met: [] Not Met: [] Adjusted  5. Pt will be able to return to work w/ min to no limitations standing 8 hr/day. (patient specific functional goal)    [] Progressing: [] Met: [] Not Met: [] Adjusted    Overall Progression Towards Functional goals/ Treatment Progress Update:  [] Patient is progressing as expected towards functional goals listed.     [] Progression is slowed due to complexities/Impairments listed. [] Progression has been slowed due to co-morbidities. [x] Plan just implemented, too soon to assess goals progression <30days   [] Goals require adjustment due to lack of progress  [] Patient is not progressing as expected and requires additional follow up with physician  [] Other    ASSESSMENT: Patient with improved tolerance to passive ROM, demonstrating decreased apprehension to movement. Good tolerance to self-directed knee flexion stretching, with improvements in ROM noted as compared to Friday. Patient would benefit from continued skilled physical therapy to improve ROM and strength s/p R TKA and ROBERT. Treatment/Activity Tolerance:  [] Patient tolerated treatment well [] Patient limited by fatique  [x] Patient limited by pain  [] Patient limited by other medical complications  [] Other:     Prognosis: [x] Good [] Fair  [] Poor    Patient Requires Follow-up: [x] Yes  [] No    PLAN: Cont therapy after manipulation w/ focus on motion. [x] Continue per plan of care [] Alter current plan (see comments)  [] Plan of care initiated [] Hold pending MD visit [] Discharge    Electronically signed by: Destiny Kamara, PT, DPT    Note: If patient does not return for scheduled/ recommended follow up visits, this note will serve as a discharge from care along with most recent update on progress.

## 2022-10-04 ENCOUNTER — HOSPITAL ENCOUNTER (OUTPATIENT)
Dept: PHYSICAL THERAPY | Age: 72
Setting detail: THERAPIES SERIES
Discharge: HOME | End: 2022-10-04
Payer: MEDICARE

## 2022-10-04 PROCEDURE — 97016 VASOPNEUMATIC DEVICE THERAPY: CPT | Performed by: PHYSICAL THERAPIST

## 2022-10-04 PROCEDURE — 97110 THERAPEUTIC EXERCISES: CPT | Performed by: PHYSICAL THERAPIST

## 2022-10-04 PROCEDURE — 97140 MANUAL THERAPY 1/> REGIONS: CPT | Performed by: PHYSICAL THERAPIST

## 2022-10-04 NOTE — FLOWSHEET NOTE
[facility] [place] [place] [address]  Phone: [phone]   Fax: [phone]    Physical Therapy Treatment Note/ Progress Report:     Date:  10/4/2022  Patient Name:  Ace Perales    :  1950  MRN: [id number]  Restrictions/Precautions:  hx of cancer; recent surg  Medical/Treatment Diagnosis Information:  Diagnosis: M17.11 (ICD-10-CM) - Primary osteoarthritis of right knee   Treatment Diagnosis: decreased abilty to ambualte and function   DOS 22   R TKA  Date of Manipulation 22 R knee  Insurance/Certification information:   [facility] Medicare  Physician Information:   Dr. Dalton Clarity  Has the plan of care been signed (Y/N):        []  Yes  [x]  No     Date of Patient follow up with Physician: 22      Is this a Progress Report:     []  Yes  [x]  No        If Yes:  Date Range for reporting period:  Beginning 22  Ending    Progress report will be due (10 Rx or 30 days whichever is less):        Recertification will be due (POC Duration  / 90 days whichever is less): 10/6/22         Visit # Insurance Allowable Auth Required   8 BMN [x]  Yes []  No        Functional Scale: FOTO 31; LEFS 17.7    Date assessed:  9/6/22  10/4/22 FOTO       Latex Allergy:  [x]NO      []YES  Preferred Language for Healthcare:   [x]English       []other:    Pain level:  4/10 after manipulation     SUBJECTIVE:  5+ weeks s/p surg; 4 days post manipulation  Pt had ROBERT earlier this morning. Here by herself, amb w/ cane; states \"my  may have gone home, I'm not sure. \" Pt seems slightly confused; unsure as I haven't seen her before and don't know her baseline. Per ortho notes, pre ROBERT, ROM was 0-95*. No formal measurement from ROBERT in record, but photo was obtained during ROBERT showed just a couple inches between calf and thigh to demonstrate knee flexion ROM. She was given pain meds.      10/4/22 Pt is handling pain well and only needing OTC medication. Pt is icing still icing at home. RTW scheduled for Nov 5th    OBJECTIVE: See eval  Observation: incision healing well but adhesions noted;  Girth measurements: L 43 cm @ joint line; 45 cm @ superior patella; R 47 cm @ joint line; 48 cm @ superior patella;  9/15/22 R 46 cm @ joint line; 48 cm @ superior patella;  9/22/22  R 46 cm @ joint line; 47 cm @ superior patella;     Palpation: hypersensitive/tender around knee joint generally into lower leg;     Functional Mobility/Transfers: ind but slow and cautious w/ R LE for transfers;     Posture: ant tilt , increased lumbar lordosis, genu valgus noted     Gait- ambulates with min limp noted using SPC, decreased stance phase and shortened stride length;               PROM AROM 9/30- after manipulation     L R L R 10/4/22    Hip Flexion 110 100 100 100    Knee Flexion 120 120 120 113 @ on floor  (110 pre surg) (Supine) AAROM  L= 130*  R= 90-95*    Knee Extension 0 -15 0 -1 (-20 pre surg) Bilat lacks approx 5*         9/6/22 pre surg measurements  Strength (0-5) Left Right   Hip Flexion - seated 5 4   Hip Abduction - sidelyling 5 4   Hip Extension 5 4   Quads 27# 9/6/22 Poor (17#)   Hamstrings 5 4   Ankle DF 5 4   Ankle PF 5 4           Flexibility       Hamstrings (90/90) -10 -15   ITB (Maicol) tight tight   Quads (Ely's) tighit 90   Hip Flexor (Jerry) tight tight           Girth       Mid patella 44 45   Suprapatellar 48 49      Test measurements:      RESTRICTIONS/PRECAUTIONS: colon cancer; recent R TKA    Exercises/Interventions:   Exercise/Equipment Resistance/Repetitions Other comments   Cardio/Warm-up     Bike     Treadmill     NuStep X5 min warm up Level 4   Stretching     Hamstring Seated 10\" x10    Hip Flexion     ITB     Grion     Quad     Inclined Calf 10\"x10    Towel Pull         ROM     Passive     Active Assisted at side of table 5\"x15 w/ foot on floor and sliding on board     AAROM Beginning and end of session; goals to achieve symmetry Rolling on SB using strap 10\"x10     Prone stretch w/ towel 10\"x15     Knee flexion step stretch 30 secx4     AROM  5\" X15 sitting off edge of table    Weight Shift    Weight Hangs     Sheet Pulls     Heel slide Supine w/ belt 2 min ROM 0-90*   Ankle Pumps           Patellar Glides     Medial     Superior     Inferior          STRENGTH     SLR     Supine Cuing needed for contraction   Standing hip ext    Abduction    Standing abd    Adducton    SLR+          Isometrics     Quad sets 10 x 5sec Supine bilat        CKC     Calf raises    Wall sits     Step ups        1 leg stand     Squatting     CC TKE    Balance        PRE     Extension RANGE:   Flexion RANGE:   Leg Press RANGE:        Cable Column     Ed on POC, expectations and goals         Manual/Modalities     Fluid movement and scar massage    manual 10' PROM w/ OP, knee extension stretch with overpressure, quad STM, gentle pattellar mobs    Ice X10 minutes Vaso unavailable 10/3/22     Therapeutic Exercise and NMR EXR  [x] (22590) Provided verbal/tactile cueing for activities related to strengthening, flexibility, endurance, ROM for improvements in LE, proximal hip, and core control with self care, mobility, lifting, ambulation. [x] (59121) Provided verbal/tactile cueing for activities related to improving balance, coordination, kinesthetic sense, posture, motor skill, proprioception  to assist with LE, proximal hip, and core control in self care, mobility, lifting, ambulation and eccentric single leg control.      NMR and Therapeutic Activities:    [x] (12246 or 29236) Provided verbal/tactile cueing for activities related to improving balance, coordination, kinesthetic sense, posture, motor skill, proprioception and motor activation to allow for proper function of core, proximal hip and LE with self care and ADLs  [] (74054) Gait Re-education- Provided training and instruction to the patient for proper LE, core and proximal hip recruitment and positioning and eccentric body weight control with ambulation re-education including up and down stairs     Home Exercise Program:    [x] (43242) Reviewed/Progressed HEP activities related to strengthening, flexibility, endurance, ROM of core, proximal hip and LE for functional self-care, mobility, lifting and ambulation/stair navigation            Written HEP est    Access Code: [email or web address]  URL: [email or web address]  Date: 09/06/2022  Prepared by: Satira Needle    Exercises  Long Sitting Calf Stretch with Strap - 2 x daily - 7 x weekly - 1 sets - 10 reps - 10 hold  Seated Table Hamstring Stretch - 2 x daily - 7 x weekly - 1 sets - 10 reps - 10 hold  Long Sitting Quad Set - 2 x daily - 7 x weekly - 1 sets - 10 reps - 10 hold  Active Straight Leg Raise with Quad Set - 2 x daily - 7 x weekly - 4 sets - 5 reps  Supine Hip Adduction Isometric with Ball - 2 x daily - 7 x weekly - 1 sets - 10 reps - 10 hold  Standing Terminal Knee Extension with Resistance - 2 x daily - 7 x weekly - 1 sets - 10 reps - 10 hold  Seated Knee Flexion AAROM - 2 x daily - 7 x weekly - 1 sets - 10 reps - 10 hold      [] (71803)Reviewed/Progressed HEP activities related to improving balance, coordination, kinesthetic sense, posture, motor skill, proprioception of core, proximal hip and LE for self care, mobility, lifting, and ambulation/stair navigation      Manual Treatments:  PROM / STM / Oscillations-Mobs:  G-I, II, III, IV (PA's, Inf., Post.)  [x] (14443) Provided manual therapy to mobilize LE, proximal hip and/or LS spine soft tissue/joints for the purpose of modulating pain, promoting relaxation,  increasing ROM, reducing/eliminating soft tissue swelling/inflammation/restriction, improving soft tissue extensibility and allowing for proper ROM for normal function with self care, mobility, lifting and ambulation. Modalities:     [] GAME READY (VASO)- for significant edema, swelling, pain control.      Charges:  Timed Code Treatment Minutes: 40 (10 man, 30 TE)   Total Treatment Minutes: 50     [] EVAL (LOW) 65376 (typically 20 minutes face-to-face)  [] EVAL (MOD) 22132 (typically 30 minutes face-to-face)  [] EVAL (HIGH) 57625 (typically 45 minutes face-to-face)  [] RE-EVAL   [x] XP(73719)  x 2  [] IONTO  [] NMR (10810) x    [x] VASO 10 min - low pressure  [x] Manual (80401) x 1     [] Other:GT using SPC  [] TA (53637) x    [] Mech Traction (49217)  [] ES(attended) (16766)      [] ES (un) (47181):       GOALS:  Patient stated goal: full function of R knee to return to work  [] Progressing: [] Met: [] Not Met: [] Adjusted     Therapist goals for Patient:   Short Term Goals: To be achieved in: 2 weeks  1. Independent in HEP and progression per patient tolerance, in order to prevent re-injury. [] Progressing: [] Met: [] Not Met: [] Adjusted  2. Patient will have a decrease in pain to facilitate improvement in movement, function, and ADLs as indicated by Functional Deficits. [] Progressing: [] Met: [] Not Met: [] Adjusted     Long Term Goals: To be achieved in: 12 weeks  1. Functional index score of 61 or more for FOTO to assist with reaching prior level of function. [] Progressing: [] Met: [] Not Met: [] Adjusted  2. Patient will demonstrate increased AROM to 0-125 deg to allow for proper joint functioning as indicated by patients Functional Deficits. [] Progressing: [] Met: [] Not Met: [] Adjusted  3. Patient will demonstrate an increase in Strength to good proximal hip strength and control, within 5lb HHD in LE to allow for proper functional mobility as indicated by patients Functional Deficits. [] Progressing: [] Met: [] Not Met: [] Adjusted  4. Patient will return to 90% functional activities without increased symptoms or restriction. [] Progressing: [] Met: [] Not Met: [] Adjusted  5.  Pt will be able to return to work w/ min to no limitations standing 8 hr/day. (patient specific functional goal)    [] Progressing: [] Met: [] Not Met: [] Adjusted    Overall Progression Towards Functional goals/ Treatment Progress Update:  [] Patient is progressing as expected towards functional goals listed. [] Progression is slowed due to complexities/Impairments listed. [] Progression has been slowed due to co-morbidities. [x] Plan just implemented, too soon to assess goals progression <30days   [] Goals require adjustment due to lack of progress  [] Patient is not progressing as expected and requires additional follow up with physician  [] Other    ASSESSMENT: ROM did improve today w/ stretching for both ext and flex. Pt still has some anxiety w/ program when moving knee into ext or flex. Pt did better when allowed to control activity. Quad contraction challenging for pt and delayed. Edema still noted in R LE around knee generally. Reviewed priority of stretching and R knee ROM several times a day. Treatment/Activity Tolerance:  [] Patient tolerated treatment well [] Patient limited by fatique  [x] Patient limited by pain  [] Patient limited by other medical complications  [] Other:     Prognosis: [x] Good [] Fair  [] Poor    Patient Requires Follow-up: [x] Yes  [] No    PLAN: Cont therapy after manipulation w/ focus on motion. [x] Continue per plan of care [] Alter current plan (see comments)  [] Plan of care initiated [] Hold pending MD visit [] Discharge    Electronically signed by: Christina Etienne PT, MS, OMT-C    Physical Therapist [place] license #[id number]  Physical Therapist [place] license #[id number]     Note: If patient does not return for scheduled/ recommended follow up visits, this note will serve as a discharge from care along with most recent update on progress.

## 2022-10-05 ENCOUNTER — HOSPITAL ENCOUNTER (OUTPATIENT)
Dept: PHYSICAL THERAPY | Age: 72
Setting detail: THERAPIES SERIES
Discharge: HOME | End: 2022-10-05
Payer: MEDICARE

## 2022-10-05 PROCEDURE — 97140 MANUAL THERAPY 1/> REGIONS: CPT | Performed by: PHYSICAL THERAPIST

## 2022-10-05 PROCEDURE — 97110 THERAPEUTIC EXERCISES: CPT | Performed by: PHYSICAL THERAPIST

## 2022-10-05 PROCEDURE — 97016 VASOPNEUMATIC DEVICE THERAPY: CPT | Performed by: PHYSICAL THERAPIST

## 2022-10-05 NOTE — FLOWSHEET NOTE
[address] [facility] [address]  Phone: [phone]   Fax: [phone]    Physical Therapy Treatment Note/ Progress Report:     Date:  10/5/2022  Patient Name:  Carina Nava    :  1950  MRN: [id number]  Restrictions/Precautions:  hx of cancer; recent surg  Medical/Treatment Diagnosis Information:  Diagnosis: M17.11 (ICD-10-CM) - Primary osteoarthritis of right knee   Treatment Diagnosis: decreased abilty to ambualte and function   DOS 22   R TKA  Date of Manipulation 22 R knee  Insurance/Certification information:   [employer]  Physician Information:   Dr. Melinda Newman  Has the plan of care been signed (Y/N):        []  Yes  [x]  No     Date of Patient follow up with Physician: 10/21/2022      Is this a Progress Report:     []  Yes  [x]  No        If Yes:  Date Range for reporting period:  Beginning 22  Ending    Progress report will be due (10 Rx or 30 days whichever is less):        Recertification will be due (POC Duration  / 90 days whichever is less): 10/6/22         Visit # Insurance Allowable Auth Required   9 BMN [x]  Yes []  No        Functional Scale: FOTO 31; LEFS 17.7    Date assessed:  9/6/22  10/4/22 FOTO       Latex Allergy:  [x]NO      []YES  Preferred Language for Healthcare:   [x]English       []other:    Pain level:  4/10 after manipulation     SUBJECTIVE:  7 weeks s/p TKA; 5 days post manipulation  Pt had ROBERT earlier this morning. Here by herself, amb w/ cane; states \"my  may have gone home, I'm not sure. \" Pt seems slightly confused; unsure as I haven't seen her before and don't know her baseline. Per ortho notes, pre ROBERT, ROM was 0-95*. No formal measurement from ROBERT in record, but photo was obtained during ROBERT showed just a couple inches between calf and thigh to demonstrate knee flexion ROM. She was given pain meds.      10/4/22 Pt is handling pain well and only needing OTC medication. Pt is icing still icing at home. 10/5/22:    Patient reports that she felt really good yesterday. However, woke up this morning and is more stiff and sore. She took a pain pill before going to bed last night. Just took 2 Tylenol before coming in to therapy this morning. She is ambulating mostly with the cane, but does go without it at times for short distances in the house. RTW scheduled for Nov 5th    OBJECTIVE: See eval  Observation: incision healing well but adhesions noted;  Girth measurements: L 43 cm @ joint line; 45 cm @ superior patella; R 47 cm @ joint line; 48 cm @ superior patella;  9/15/22 R 46 cm @ joint line; 48 cm @ superior patella;  9/22/22  R 46 cm @ joint line; 47 cm @ superior patella;     Palpation: hypersensitive/tender around knee joint generally into lower leg;     Functional Mobility/Transfers: ind but slow and cautious w/ R LE for transfers;     Posture: ant tilt , increased lumbar lordosis, genu valgus noted     Gait- ambulates with min limp noted using SPC, decreased stance phase and shortened stride length;               PROM AROM 9/30- after manipulation     L R L R 10/5/22    Hip Flexion 110 100 100 100    Knee Flexion 120 120 120 113 seated EOB  (110 pre surg) (Supine) AAROM  L= 130*  R= 90-95*    Knee Extension 0 -15 0 -1 (-20 pre surg) Bilat lacks approx 5*         9/6/22 pre surg measurements  Strength (0-5) Left Right   Hip Flexion - seated 5 4   Hip Abduction - sidelyling 5 4   Hip Extension 5 4   Quads 27# 9/6/22 Poor (17#)   Hamstrings 5 4   Ankle DF 5 4   Ankle PF 5 4           Flexibility       Hamstrings (90/90) -10 -15   ITB (Maicol) tight tight   Quads (Ely's) tighit 90   Hip Flexor (Jerry) tight tight           Girth       Mid patella 44 45   Suprapatellar 48 49      Test measurements:      RESTRICTIONS/PRECAUTIONS: colon cancer; recent R TKA    Exercises/Interventions:   Exercise/Equipment Resistance/Repetitions Other comments   Cardio/Warm-up Bike     Treadmill     NuStep X5 min warm up Level 4   Stretching     Hamstring Seated 10\" x10    Hip Flexion     ITB     Grion     Quad     Inclined Calf 10\"x10    Towel Pull         ROM     Passive     Active Assisted at side of table 5\"x15 w/ foot on floor and sliding on board     AAROM Beginning and end of session; goals to achieve symmetry    Rolling on SB using strap 10\"x10     Prone stretch w/ towel 10\"x15     Knee flexion step stretch 30 secx4     AROM  5\" X15 sitting off edge of table    Weight Shift    Weight Hangs     Sheet Pulls     Heel slide Supine w/ belt 2 min ROM 0-90*   Ankle Pumps           Patellar Glides     Medial     Superior     Inferior          STRENGTH     SLR     Supine 2x10    Standing hip ext    Abduction    Standing abd    Adducton    SLR+          Isometrics     Quad sets 15 x 5sec Supine bilat   Hip Add ball squeeze [id number]    CKC     Calf raises    Wall sits     Step ups        1 leg stand     Squatting     CC TKE    Balance        PRE     Extension RANGE:   Flexion RANGE:   Leg Press RANGE:        Cable Column     Ed on POC, expectations and goals         Manual/Modalities     Fluid movement and scar massage    manual 10' PROM w/ OP, knee extension stretch with overpressure, quad STM, gentle pattellar mobs    Vaso unavailable 10/3/22     Therapeutic Exercise and NMR EXR  [x] (79428) Provided verbal/tactile cueing for activities related to strengthening, flexibility, endurance, ROM for improvements in LE, proximal hip, and core control with self care, mobility, lifting, ambulation. [x] (06014) Provided verbal/tactile cueing for activities related to improving balance, coordination, kinesthetic sense, posture, motor skill, proprioception  to assist with LE, proximal hip, and core control in self care, mobility, lifting, ambulation and eccentric single leg control.      NMR and Therapeutic Activities:    [x] (97112 or 40095) Provided verbal/tactile cueing for activities related to improving balance, coordination, kinesthetic sense, posture, motor skill, proprioception and motor activation to allow for proper function of core, proximal hip and LE with self care and ADLs  [] (94870) Gait Re-education- Provided training and instruction to the patient for proper LE, core and proximal hip recruitment and positioning and eccentric body weight control with ambulation re-education including up and down stairs     Home Exercise Program:    [x] (05811) Reviewed/Progressed HEP activities related to strengthening, flexibility, endurance, ROM of core, proximal hip and LE for functional self-care, mobility, lifting and ambulation/stair navigation            Written HEP est    Access Code: [email or web address]  URL: [email or web address]  Date: 09/06/2022  Prepared by: Nancy Rincon    Exercises  Long Sitting Calf Stretch with Strap - 2 x daily - 7 x weekly - 1 sets - 10 reps - 10 hold  Seated Table Hamstring Stretch - 2 x daily - 7 x weekly - 1 sets - 10 reps - 10 hold  Long Sitting Quad Set - 2 x daily - 7 x weekly - 1 sets - 10 reps - 10 hold  Active Straight Leg Raise with Quad Set - 2 x daily - 7 x weekly - 4 sets - 5 reps  Supine Hip Adduction Isometric with Ball - 2 x daily - 7 x weekly - 1 sets - 10 reps - 10 hold  Standing Terminal Knee Extension with Resistance - 2 x daily - 7 x weekly - 1 sets - 10 reps - 10 hold  Seated Knee Flexion AAROM - 2 x daily - 7 x weekly - 1 sets - 10 reps - 10 hold      [] (29866)Reviewed/Progressed HEP activities related to improving balance, coordination, kinesthetic sense, posture, motor skill, proprioception of core, proximal hip and LE for self care, mobility, lifting, and ambulation/stair navigation      Manual Treatments:  PROM / STM / Oscillations-Mobs:  G-I, II, III, IV (PA's, Inf., Post.)  [x] (64478) Provided manual therapy to mobilize LE, proximal hip and/or LS spine soft tissue/joints for the purpose of modulating pain, promoting relaxation,  increasing ROM, reducing/eliminating soft tissue swelling/inflammation/restriction, improving soft tissue extensibility and allowing for proper ROM for normal function with self care, mobility, lifting and ambulation. Modalities:     [x] GAME READY (VASO)- for significant edema, swelling, pain control. Charges:  Timed Code Treatment Minutes: 36 (8 man, 30 TE)   Total Treatment Minutes: 50     [] EVAL (LOW) 78908 (typically 20 minutes face-to-face)  [] EVAL (MOD) 49721 (typically 30 minutes face-to-face)  [] EVAL (HIGH) 18472 (typically 45 minutes face-to-face)  [] RE-EVAL   [x] IX(52905)  x 2  [] IONTO  [] NMR (64344) x    [x] VASO 10 min - low pressure  [x] Manual (03356) x 1     [] Other:GT using SPC  [] TA (29143) x    [] Mech Traction (33455)  [] ES(attended) (98516)      [] ES (un) (81706):       GOALS:  Patient stated goal: full function of R knee to return to work  [] Progressing: [] Met: [] Not Met: [] Adjusted     Therapist goals for Patient:   Short Term Goals: To be achieved in: 2 weeks  1. Independent in HEP and progression per patient tolerance, in order to prevent re-injury. [] Progressing: [] Met: [] Not Met: [] Adjusted  2. Patient will have a decrease in pain to facilitate improvement in movement, function, and ADLs as indicated by Functional Deficits. [] Progressing: [] Met: [] Not Met: [] Adjusted     Long Term Goals: To be achieved in: 12 weeks  1. Functional index score of 61 or more for FOTO to assist with reaching prior level of function. [] Progressing: [] Met: [] Not Met: [] Adjusted  2. Patient will demonstrate increased AROM to 0-125 deg to allow for proper joint functioning as indicated by patients Functional Deficits. [] Progressing: [] Met: [] Not Met: [] Adjusted  3. Patient will demonstrate an increase in Strength to good proximal hip strength and control, within 5lb HHD in LE to allow for proper functional mobility as indicated by patients Functional Deficits.    [] Progressing: [] Met: [] Not Met: [] Adjusted  4. Patient will return to 90% functional activities without increased symptoms or restriction. [] Progressing: [] Met: [] Not Met: [] Adjusted  5. Pt will be able to return to work w/ min to no limitations standing 8 hr/day. (patient specific functional goal)    [] Progressing: [] Met: [] Not Met: [] Adjusted    Overall Progression Towards Functional goals/ Treatment Progress Update:  [] Patient is progressing as expected towards functional goals listed. [] Progression is slowed due to complexities/Impairments listed. [] Progression has been slowed due to co-morbidities. [x] Plan just implemented, too soon to assess goals progression <30days   [] Goals require adjustment due to lack of progress  [] Patient is not progressing as expected and requires additional follow up with physician  [] Other    ASSESSMENT:   Her goals are to be ready for a cruise coming up at the end of the month. And then also ready to RTW next month as a . Her scar mobility and patellar mobility are fair. She states she is massaging her scar at home. Some swelling persists throughout her right knee. Demonstrates slow/deliberate type of gait pattern. Treatment/Activity Tolerance:  [] Patient tolerated treatment well [] Patient limited by fatique  [x] Patient limited by pain  [] Patient limited by other medical complications  [] Other:     Prognosis: [x] Good [] Fair  [] Poor    Patient Requires Follow-up: [x] Yes  [] No    PLAN: Cont therapy after manipulation w/ focus on motion.    [x] Continue per plan of care [] Alter current plan (see comments)  [] Plan of care initiated [] Hold pending MD visit [] Discharge    Electronically signed by:     Rusty Parada PT      Board Certified Orthopaedic Clinical Specialist  [facility] Certified  Physical Therapist    [id number] [address] PT #[id number]  [place] [facility] #[id number]      Note: If patient does not return for scheduled/ recommended follow up visits, this note will serve as a discharge from care along with most recent update on progress.

## 2022-10-06 ENCOUNTER — HOSPITAL ENCOUNTER (OUTPATIENT)
Dept: PHYSICAL THERAPY | Age: 72
Setting detail: THERAPIES SERIES
Discharge: HOME | End: 2022-10-06
Payer: MEDICARE

## 2022-10-06 PROCEDURE — 97016 VASOPNEUMATIC DEVICE THERAPY: CPT | Performed by: PHYSICAL THERAPIST

## 2022-10-06 PROCEDURE — 97110 THERAPEUTIC EXERCISES: CPT | Performed by: PHYSICAL THERAPIST

## 2022-10-06 PROCEDURE — 97140 MANUAL THERAPY 1/> REGIONS: CPT | Performed by: PHYSICAL THERAPIST

## 2022-10-06 NOTE — PROGRESS NOTES
[facility], [facility] [address]  Phone: [phone]   Fax: [phone]     Physical Therapy Re-Certification Plan of Care    Dear Dr. Damaris Loaiza,    We had the pleasure of treating the following patient for physical therapy services at [address] [place] [address]. A summary of our findings can be found in the updated assessment below. This includes our plan of care. If you have any questions or concerns regarding these findings, please do not hesitate to contact me at the office phone number checked above. Thank you for the referral.     Physician Signature:________________________________Date:__________________  By signing above (or electronic signature), therapists plan is approved by physician      Overall Response to Treatment:   []Patient is responding well to treatment and improvement is noted with regards  to goals   []Patient should continue to improve in reasonable time if they continue HEP   []Patient has plateaued and is no longer responding to skilled PT intervention    []Patient is getting worse and would benefit from return to referring MD   []Patient unable to adhere to initial POC   []Other: Laurie Meraz is doing well after manipulation to progress ROM in R knee. Pt was able to reach 118 in sitting in knee for R knee w/ pain at end range but empty end feel. She is able to manage her pain better and having less anxiety w/ stretching. Quad activation is delayed so ed and focus given to improve active contraction for gt pattern and functional pattern. Recommend that pt continue therapy to reach ROM goals and improve function.       Physical Therapy Treatment Note/ Progress Report:     Date:  10/6/2022  Patient Name:  Melody Pugh    :  1950  MRN: [id number]  Restrictions/Precautions:  hx of cancer; recent surg  Medical/Treatment Diagnosis Information:  Diagnosis: M17.11 (ICD-10-CM) - Primary osteoarthritis of right knee   Treatment Diagnosis: decreased abilty to ambualte and function   DOS 8/17/22   R TKA  Date of Manipulation 9/30/22 R knee  Insurance/Certification information:   [employer]  Physician Information:   Dr. Darwin Ge  Has the plan of care been signed (Y/N):        []  Yes  [x]  No     Date of Patient follow up with Physician: 10/21/2022      Is this a Progress Report:     []  Yes  [x]  No        If Yes:  Date Range for reporting period:  Beginning 9/6/22  Ending    Progress report will be due (10 Rx or 30 days whichever is less): 77/2/69       Recertification will be due (POC Duration  / 90 days whichever is less): 10/6/22         Visit # Insurance Allowable Auth Required   9 BMN [x]  Yes []  No        Functional Scale: FOTO 31; LEFS 17.7    Date assessed:  9/6/22  10/6/22 FOTO 60      Latex Allergy:  [x]NO      []YES  Preferred Language for Healthcare:   [x]English       []other:    Pain level:  4/10 after manipulation     SUBJECTIVE:  7 weeks s/p TKA; 6 days post manipulation  Pt had ROBERT earlier this morning. Here by herself, amb w/ cane; states \"my  may have gone home, I'm not sure. \" Pt seems slightly confused; unsure as I haven't seen her before and don't know her baseline. Per ortho notes, pre ROBERT, ROM was 0-95*. No formal measurement from ROBERT in record, but photo was obtained during ROBERT showed just a couple inches between calf and thigh to demonstrate knee flexion ROM. She was given pain meds. 10/6/22 pt has stiffness again this AM. Pt is taking Tylenol for pain but she had to take prescription pain medication last night due to pain. RTW scheduled for Nov 5th    OBJECTIVE: See eval  Observation: incision healing well but adhesions noted;  Girth measurements: L 43 cm @ joint line; 45 cm @ superior patella; R 47 cm @ joint line; 48 cm @ superior patella;  9/15/22 R 46 cm @ joint line; 48 cm @ superior patella;  9/22/22  R 46 cm @ joint line; 47 cm @ superior patella;     Palpation: hypersensitive/tender around knee joint generally into lower leg;     Functional Mobility/Transfers: ind but slow and cautious w/ R LE for transfers;     Posture: ant tilt , increased lumbar lordosis, genu valgus noted     Gait- ambulates with min limp noted using SPC, decreased stance phase and shortened stride length;               PROM AROM 9/30- after manipulation     L R L R 10/6/22    Hip Flexion 110 100 100 100    Knee Flexion 120 120 120 118 seated EOB  (110 pre surg) (Supine) AAROM  L= 130*  R= 90-95*    Knee Extension 0 -15 0 0 (-20 pre surg) Bilat lacks approx 5*         9/6/22 pre surg measurements  Strength (0-5) Left Right   Hip Flexion - seated 5 4   Hip Abduction - sidelyling 5 4   Hip Extension 5 4   Quads 27# 9/6/22 Poor (17#)   Hamstrings 5 4   Ankle DF 5 4   Ankle PF 5 4           Flexibility       Hamstrings (90/90) -10 -15   ITB (Maicol) tight tight   Quads (Ely's) tighit 90   Hip Flexor (Jerry) tight tight           Girth       Mid patella 44 45   Suprapatellar 48 49      Test measurements:      RESTRICTIONS/PRECAUTIONS: colon cancer; recent R TKA    Exercises/Interventions:   Exercise/Equipment Resistance/Repetitions Other comments   Cardio/Warm-up     Bike     Treadmill     NuStep X5 min warm up Level 4   Stretching     Hamstring Seated 10\" x10    Hip Flexion     ITB     Grion     Quad     Inclined Calf 10\"x10    Towel Pull         ROM     Passive     Active Assisted at side of table 5\"x15 w/ foot on floor and sliding on board     AAROM Beginning and end of session; goals to achieve symmetry    Rolling on SB using strap 10\"x10     Prone stretch w/ towel 10\"x15     Knee flexion step stretch 30 secx4     AROM  5\" X15 sitting off edge of table    Weight Shift    Weight Hangs     Sheet Pulls     Heel slide Supine w/ belt 2 min ROM 0-90*   Ankle Pumps           Patellar Glides     Medial     Superior     Inferior          STRENGTH     SLR     Supine 2x10    Standing hip ext    Abduction Standing abd    Adducton    SLR+          Isometrics     Quad sets 15 x 5sec Supine bilat   Hip Add ball squeeze    CKC     Calf raises    Wall sits     Step ups        1 leg stand     Squatting @ bars x20    CC TKE Blue TB 10\"x10    Balance        PRE     Extension RANGE:   Flexion RANGE:   Leg Press RANGE:        Cable Column     Ed on POC, expectations and goals         Manual/Modalities     Fluid movement and scar massage    manual 10' PROM w/ OP, knee extension stretch with overpressure, quad STM, gentle pattellar mobs    Vaso unavailable 10/3/22     Therapeutic Exercise and NMR EXR  [x] (37559) Provided verbal/tactile cueing for activities related to strengthening, flexibility, endurance, ROM for improvements in LE, proximal hip, and core control with self care, mobility, lifting, ambulation. [x] (48389) Provided verbal/tactile cueing for activities related to improving balance, coordination, kinesthetic sense, posture, motor skill, proprioception  to assist with LE, proximal hip, and core control in self care, mobility, lifting, ambulation and eccentric single leg control.      NMR and Therapeutic Activities:    [x] (82467 or 43952) Provided verbal/tactile cueing for activities related to improving balance, coordination, kinesthetic sense, posture, motor skill, proprioception and motor activation to allow for proper function of core, proximal hip and LE with self care and ADLs  [] (78335) Gait Re-education- Provided training and instruction to the patient for proper LE, core and proximal hip recruitment and positioning and eccentric body weight control with ambulation re-education including up and down stairs     Home Exercise Program:    [x] (26395) Reviewed/Progressed HEP activities related to strengthening, flexibility, endurance, ROM of core, proximal hip and LE for functional self-care, mobility, lifting and ambulation/stair navigation            Written HEP est    Access Code: [email or web address]  URL: [email or web address]  Date: 09/06/2022  Prepared by: Ashley Watson    Exercises  Long Sitting Calf Stretch with Strap - 2 x daily - 7 x weekly - 1 sets - 10 reps - 10 hold  Seated Table Hamstring Stretch - 2 x daily - 7 x weekly - 1 sets - 10 reps - 10 hold  Long Sitting Quad Set - 2 x daily - 7 x weekly - 1 sets - 10 reps - 10 hold  Active Straight Leg Raise with Quad Set - 2 x daily - 7 x weekly - 4 sets - 5 reps  Supine Hip Adduction Isometric with Ball - 2 x daily - 7 x weekly - 1 sets - 10 reps - 10 hold  Standing Terminal Knee Extension with Resistance - 2 x daily - 7 x weekly - 1 sets - 10 reps - 10 hold  Seated Knee Flexion AAROM - 2 x daily - 7 x weekly - 1 sets - 10 reps - 10 hold      [] (43295)Reviewed/Progressed HEP activities related to improving balance, coordination, kinesthetic sense, posture, motor skill, proprioception of core, proximal hip and LE for self care, mobility, lifting, and ambulation/stair navigation      Manual Treatments:  PROM / STM / Oscillations-Mobs:  G-I, II, III, IV (PA's, Inf., Post.)  [x] (52422) Provided manual therapy to mobilize LE, proximal hip and/or LS spine soft tissue/joints for the purpose of modulating pain, promoting relaxation,  increasing ROM, reducing/eliminating soft tissue swelling/inflammation/restriction, improving soft tissue extensibility and allowing for proper ROM for normal function with self care, mobility, lifting and ambulation. Modalities:     [x] GAME READY (VASO)- for significant edema, swelling, pain control.      Charges:  Timed Code Treatment Minutes: 36 (10 man, 30 TE)   Total Treatment Minutes: 50     [] EVAL (LOW) 11340 (typically 20 minutes face-to-face)  [] EVAL (MOD) 90933 (typically 30 minutes face-to-face)  [] EVAL (HIGH) 99639 (typically 45 minutes face-to-face)  [] RE-EVAL   [x] KI(70705)  x 2  [] IONTO  [] NMR (25140) x    [x] VASO 10 min - low pressure  [x] Manual (65498) x 1     [] Other:GT using SPC  [] TA (78139) x    [] [facility] Traction (93109)  [] ES(attended) (99563)      [] ES (un) (87734):       GOALS:  Patient stated goal: full function of R knee to return to work  [] Progressing: [] Met: [] Not Met: [] Adjusted     Therapist goals for Patient:   Short Term Goals: To be achieved in: 2 weeks  1. Independent in HEP and progression per patient tolerance, in order to prevent re-injury. [] Progressing: [] Met: [] Not Met: [] Adjusted  2. Patient will have a decrease in pain to facilitate improvement in movement, function, and ADLs as indicated by Functional Deficits. [] Progressing: [] Met: [] Not Met: [] Adjusted     Long Term Goals: To be achieved in: 12 weeks  1. Functional index score of 61 or more for FOTO to assist with reaching prior level of function. [] Progressing: [] Met: [] Not Met: [] Adjusted  2. Patient will demonstrate increased AROM to 0-125 deg to allow for proper joint functioning as indicated by patients Functional Deficits. [] Progressing: [] Met: [] Not Met: [] Adjusted  3. Patient will demonstrate an increase in Strength to good proximal hip strength and control, within 5lb HHD in LE to allow for proper functional mobility as indicated by patients Functional Deficits. [] Progressing: [] Met: [] Not Met: [] Adjusted  4. Patient will return to 90% functional activities without increased symptoms or restriction. [] Progressing: [] Met: [] Not Met: [] Adjusted  5. Pt will be able to return to work w/ min to no limitations standing 8 hr/day. (patient specific functional goal)    [] Progressing: [] Met: [] Not Met: [] Adjusted    Overall Progression Towards Functional goals/ Treatment Progress Update:  [] Patient is progressing as expected towards functional goals listed. [] Progression is slowed due to complexities/Impairments listed. [] Progression has been slowed due to co-morbidities.   [x] Plan just implemented, too soon to assess goals progression <30days   [] Goals require adjustment due to lack of progress  [] Patient is not progressing as expected and requires additional follow up with physician  [] Other    ASSESSMENT: ROM in R knee progressing steadily after manipulation. Pt is having less apprehension w/ bending and able to more on her own. Swelling is unchanged this week. Gt skills need cuing for TKE when WBing to avoid WBing on flex knee. Pt is able to control symptoms which is helping w/ anxiety. Progressing well toward ROM goals. Treatment/Activity Tolerance:  [] Patient tolerated treatment well [] Patient limited by fatique  [x] Patient limited by pain  [] Patient limited by other medical complications  [] Other:     Prognosis: [x] Good [] Fair  [] Poor    Patient Requires Follow-up: [x] Yes  [] No    PLAN: Cont therapy after manipulation w/ focus on motion. [x] Continue per plan of care [] Alter current plan (see comments)  [] Plan of care initiated [] Hold pending MD visit [] Discharge    Electronically signed by: Pasha Johnson, PT, MS, OMT-C    Physical Therapist [id number] [address] license #[id number]  Physical Therapist [place] license #[id number]           Note: If patient does not return for scheduled/ recommended follow up visits, this note will serve as a discharge from care along with most recent update on progress.

## 2022-10-07 ENCOUNTER — HOSPITAL ENCOUNTER (OUTPATIENT)
Dept: PHYSICAL THERAPY | Age: 72
Setting detail: THERAPIES SERIES
Discharge: HOME | End: 2022-10-07
Payer: MEDICARE

## 2022-10-07 PROCEDURE — 97140 MANUAL THERAPY 1/> REGIONS: CPT | Performed by: PHYSICAL THERAPIST

## 2022-10-07 PROCEDURE — 97016 VASOPNEUMATIC DEVICE THERAPY: CPT | Performed by: PHYSICAL THERAPIST

## 2022-10-07 PROCEDURE — 97110 THERAPEUTIC EXERCISES: CPT | Performed by: PHYSICAL THERAPIST

## 2022-10-07 NOTE — PROGRESS NOTES
[facility] [address]  Phone: [phone]   Fax: [phone]        Physical Therapy Treatment Note/ Progress Report:     Date:  10/7/2022  Patient Name:  Mainor Stevens    :  1950  MRN: [id number]  Restrictions/Precautions:  hx of cancer; recent surg  Medical/Treatment Diagnosis Information:  Diagnosis: M17.11 (ICD-10-CM) - Primary osteoarthritis of right knee   Treatment Diagnosis: decreased abilty to ambulate and function   DOS 22   R TKA  Date of Manipulation 22 R knee  Insurance/Certification information:   [facility] Medicare  Physician Information:   Dr. Ira Carrasquillo  Has the plan of care been signed (Y/N):        [x]  Yes  []  No     Date of Patient follow up with Physician: 10/21/2022      Is this a Progress Report:     []  Yes  [x]  No        If Yes:  Date Range for reporting period:  Beginning 22  Ending    Progress report will be due (10 Rx or 30 days whichever is less): 87/3/23       Recertification will be due (POC Duration  / 90 days whichever is less): 22         Visit # Insurance Allowable Auth Required   10 BMN [x]  Yes []  No        Functional Scale: FOTO 31; LEFS 17.7    Date assessed:  9/6/22  10/6/22 FOTO 60      Latex Allergy:  [x]NO      []YES  Preferred Language for Healthcare:   [x]English       []other:    Pain level:  4/10 after manipulation     SUBJECTIVE:  7 weeks s/p TKA; 1 week post manipulation  Pt had ROBERT earlier this morning. Here by herself, amb w/ cane; states \"my  may have gone home, I'm not sure. \" Pt seems slightly confused; unsure as I haven't seen her before and don't know her baseline. Per ortho notes, pre ROBERT, ROM was 0-95*. No formal measurement from ROBERT in record, but photo was obtained during ROBERT showed just a couple inches between calf and thigh to demonstrate knee flexion ROM. She was given pain meds. 10/7/22 pt drove today- the first time since surg. She struggled getting out of car once she arrived here. Driving itself went fine w/ changing pedals. She is more stiff today w/ early AM appt. RTW scheduled for Nov 5th    OBJECTIVE: See eval  Observation: incision healing well but adhesions noted;  Girth measurements: L 43 cm @ joint line; 45 cm @ superior patella; R 47 cm @ joint line; 48 cm @ superior patella;  9/15/22 R 46 cm @ joint line; 48 cm @ superior patella;  9/22/22  R 46 cm @ joint line; 47 cm @ superior patella;     Palpation: hypersensitive/tender around knee joint generally into lower leg;     Functional Mobility/Transfers: ind but slow and cautious w/ R LE for transfers;     Posture: ant tilt , increased lumbar lordosis, genu valgus noted     Gait- ambulates with min limp noted using SPC, decreased stance phase and shortened stride length;               PROM AROM 9/30- after manipulation     L R L R 10/6/22    Hip Flexion 110 100 100 100    Knee Flexion 120 120 120 120 seated EOB w/ PT op  (110 pre surg) (Supine) AAROM  L= 130*  R= 90-95*    Knee Extension 0 -15 0 0 (-20 pre surg) Bilat lacks approx 5*         9/6/22 pre surg measurements  Strength (0-5) Left Right   Hip Flexion - seated 5 4   Hip Abduction - sidelyling 5 4   Hip Extension 5 4   Quads 27# 9/6/22 Poor (17#)   Hamstrings 5 4   Ankle DF 5 4   Ankle PF 5 4           Flexibility       Hamstrings (90/90) -10 -15   ITB (Maicol) tight tight   Quads (Ely's) tighit 90   Hip Flexor (Jerry) tight tight           Girth       Mid patella 44 45   Suprapatellar 48 49      Test measurements:      RESTRICTIONS/PRECAUTIONS: colon cancer; recent R TKA    Exercises/Interventions:   Exercise/Equipment Resistance/Repetitions Other comments   Cardio/Warm-up     Bike     Treadmill     NuStep X5 min warm up Level 4   Stretching     Hamstring Seated 10\" x10    Hip Flexion     ITB     Grion     Quad     Inclined Calf 10\"x10    Towel Pull         ROM     Passive     Active Assisted at side of table 5\"x15 w/ foot on floor and sliding on board     AAROM Beginning and end of session; goals to achieve symmetry    Rolling on SB using strap 10\"x10     Prone stretch w/ towel 10\"x15     Knee flexion step stretch 10 secx5     AROM  5\" X15 sitting off edge of table    Weight Shift    Weight Hangs     Sheet Pulls     Heel slide Supine w/ belt 2 min Ankle Pumps           Patellar Glides     Medial     Superior     Inferior          STRENGTH     SLR     Supine 3x10    Standing hip ext    Abduction    Standing abd    Adducton    SLR+          Isometrics     Quad sets 10 x 10sec over foam roll Supine bilat; cuing for end range   Hip Add ball squeeze    CKC     Calf raises    Wall sits     Step ups 4\" x15 Focus on knee ext       1 leg stand     Squatting @ bars x20    CC TKE Blue TB 10\"x10    Balance        PRE     Extension RANGE:   Flexion RANGE:   Leg Press RANGE:        Cable Column     Ed on POC, expectations and goals         Manual/Modalities     Fluid movement and scar massage    manual 10' PROM w/ OP, knee extension stretch with overpressure, quad STM, gentle patellar mobs    Vaso unavailable 10/3/22     Therapeutic Exercise and NMR EXR  [x] (92132) Provided verbal/tactile cueing for activities related to strengthening, flexibility, endurance, ROM for improvements in LE, proximal hip, and core control with self care, mobility, lifting, ambulation. [x] (46527) Provided verbal/tactile cueing for activities related to improving balance, coordination, kinesthetic sense, posture, motor skill, proprioception  to assist with LE, proximal hip, and core control in self care, mobility, lifting, ambulation and eccentric single leg control.      NMR and Therapeutic Activities:    [x] (29774 or 00793) Provided verbal/tactile cueing for activities related to improving balance, coordination, kinesthetic sense, posture, motor skill, proprioception and motor activation to allow for proper function of core, proximal hip and LE with self care and ADLs  [] (71714) Gait Re-education- Provided training and instruction to the patient for proper LE, core and proximal hip recruitment and positioning and eccentric body weight control with ambulation re-education including up and down stairs     Home Exercise Program:    [x] (59101) Reviewed/Progressed HEP activities related to strengthening, flexibility, endurance, ROM of core, proximal hip and LE for functional self-care, mobility, lifting and ambulation/stair navigation            Written HEP est    Access Code: [email or web address]  URL: [email or web address]  Date: 09/06/2022  Prepared by: Lavell Davidson    Exercises  Long Sitting Calf Stretch with Strap - 2 x daily - 7 x weekly - 1 sets - 10 reps - 10 hold  Seated Table Hamstring Stretch - 2 x daily - 7 x weekly - 1 sets - 10 reps - 10 hold  Long Sitting Quad Set - 2 x daily - 7 x weekly - 1 sets - 10 reps - 10 hold  Active Straight Leg Raise with Quad Set - 2 x daily - 7 x weekly - 4 sets - 5 reps  Supine Hip Adduction Isometric with Ball - 2 x daily - 7 x weekly - 1 sets - 10 reps - 10 hold  Standing Terminal Knee Extension with Resistance - 2 x daily - 7 x weekly - 1 sets - 10 reps - 10 hold  Seated Knee Flexion AAROM - 2 x daily - 7 x weekly - 1 sets - 10 reps - 10 hold      [] (06287)Reviewed/Progressed HEP activities related to improving balance, coordination, kinesthetic sense, posture, motor skill, proprioception of core, proximal hip and LE for self care, mobility, lifting, and ambulation/stair navigation      Manual Treatments:  PROM / STM / Oscillations-Mobs:  G-I, II, III, IV (PA's, Inf., Post.)  [x] (31313) Provided manual therapy to mobilize LE, proximal hip and/or LS spine soft tissue/joints for the purpose of modulating pain, promoting relaxation,  increasing ROM, reducing/eliminating soft tissue swelling/inflammation/restriction, improving soft tissue extensibility and allowing for proper ROM for normal function with self care, mobility, lifting and ambulation. Modalities:     [x] GAME READY (VASO)- for significant edema, swelling, pain control. Charges:  Timed Code Treatment Minutes: 36 (8 man, 30 TE)   Total Treatment Minutes: 50     [] EVAL (LOW) 71423 (typically 20 minutes face-to-face)  [] EVAL (MOD) 47549 (typically 30 minutes face-to-face)  [] EVAL (HIGH) 72503 (typically 45 minutes face-to-face)  [] RE-EVAL   [x] XP(75793)  x 2  [] IONTO  [] NMR (65196) x    [x] VASO 10 min - low pressure  [x] Manual (99278) x 1     [] Other:GT using SPC  [] TA (25856) x    [] Mech Traction (84707)  [] ES(attended) (65196)      [] ES (un) (40696):       GOALS:  Patient stated goal: full function of R knee to return to work  [] Progressing: [] Met: [] Not Met: [] Adjusted     Therapist goals for Patient:   Short Term Goals: To be achieved in: 2 weeks  1. Independent in HEP and progression per patient tolerance, in order to prevent re-injury. [] Progressing: [] Met: [] Not Met: [] Adjusted  2. Patient will have a decrease in pain to facilitate improvement in movement, function, and ADLs as indicated by Functional Deficits. [] Progressing: [] Met: [] Not Met: [] Adjusted     Long Term Goals: To be achieved in: 12 weeks  1. Functional index score of 61 or more for FOTO to assist with reaching prior level of function. [] Progressing: [] Met: [] Not Met: [] Adjusted  2. Patient will demonstrate increased AROM to 0-125 deg to allow for proper joint functioning as indicated by patients Functional Deficits. [] Progressing: [] Met: [] Not Met: [] Adjusted  3. Patient will demonstrate an increase in Strength to good proximal hip strength and control, within 5lb HHD in LE to allow for proper functional mobility as indicated by patients Functional Deficits. [] Progressing: [] Met: [] Not Met: [] Adjusted  4. Patient will return to 90% functional activities without increased symptoms or restriction. [] Progressing: [] Met: [] Not Met: [] Adjusted  5.  Pt will be able to return to work w/ min to no limitations standing 8 hr/day. (patient specific functional goal)    [] Progressing: [] Met: [] Not Met: [] Adjusted    Overall Progression Towards Functional goals/ Treatment Progress Update:  [] Patient is progressing as expected towards functional goals listed. [] Progression is slowed due to complexities/Impairments listed. [] Progression has been slowed due to co-morbidities. [x] Plan just implemented, too soon to assess goals progression <30days   [] Goals require adjustment due to lack of progress  [] Patient is not progressing as expected and requires additional follow up with physician  [] Other    ASSESSMENT: Pt arrived stiffer on arrival. Quad activation limited and delayed w/ cuing needed and mod concentration for contraction w/ leg lifting. ROM improved w/ overpressure but empty end feel w/ stretching. Anxiety was less each session. Ed given on freq stretching at home over weekend to keep knee loose and maintain ROM. Treatment/Activity Tolerance:  [] Patient tolerated treatment well [] Patient limited by fatique  [x] Patient limited by pain  [] Patient limited by other medical complications  [] Other:     Prognosis: [x] Good [] Fair  [] Poor    Patient Requires Follow-up: [x] Yes  [] No    PLAN: Cont therapy after manipulation w/ focus on motion. [x] Continue per plan of care [] Alter current plan (see comments)  [] Plan of care initiated [] Hold pending MD visit [] Discharge    Electronically signed by: Gela Win PT, MS, OMT-C    Physical Therapist [place] license #[id number]  Physical Therapist [place] license #[id number]           Note: If patient does not return for scheduled/ recommended follow up visits, this note will serve as a discharge from care along with most recent update on progress.

## 2022-10-10 ENCOUNTER — APPOINTMENT (OUTPATIENT)
Dept: PHYSICAL THERAPY | Age: 72
End: 2022-10-10
Payer: MEDICARE

## 2022-10-10 ENCOUNTER — HOSPITAL ENCOUNTER (OUTPATIENT)
Dept: PHYSICAL THERAPY | Age: 72
Setting detail: THERAPIES SERIES
Discharge: HOME | End: 2022-10-10
Payer: MEDICARE

## 2022-10-10 PROCEDURE — 97140 MANUAL THERAPY 1/> REGIONS: CPT

## 2022-10-10 PROCEDURE — 97110 THERAPEUTIC EXERCISES: CPT

## 2022-10-10 NOTE — FLOWSHEET NOTE
[facility] [place]  Phone: [phone]   Fax: [phone]        Physical Therapy Treatment Note/ Progress Report:     Date:  10/10/2022  Patient Name:  Cesario Carrillo    :  1950  MRN: [id number]  Restrictions/Precautions:  hx of cancer; recent surg  Medical/Treatment Diagnosis Information:  Diagnosis: M17.11 (ICD-10-CM) - Primary osteoarthritis of right knee   Treatment Diagnosis: decreased abilty to ambulate and function   DOS 22   R TKA  Date of Manipulation 22 R knee  Insurance/Certification information:   [facility] Medicare  Physician Information:   Dr. Michelle Capone  Has the plan of care been signed (Y/N):        [x]  Yes  []  No     Date of Patient follow up with Physician: 10/21/2022      Is this a Progress Report:     []  Yes  [x]  No        If Yes:  Date Range for reporting period:  Beginning 22  Ending    Progress report will be due (10 Rx or 30 days whichever is less):        Recertification will be due (POC Duration  / 90 days whichever is less): 22         Visit # Insurance Allowable Auth Required   11 BMN [x]  Yes []  No        Functional Scale: FOTO 31; LEFS 17.7    Date assessed:  9/6/22  10/6/22 FOTO 60      Latex Allergy:  [x]NO      []YES  Preferred Language for Healthcare:   [x]English       []other:    Pain level:  4/10 after manipulation     SUBJECTIVE:  7 weeks s/p TKA; 1 week post manipulation  Pt had ROBERT earlier this morning. Here by herself, amb w/ cane; states \"my  may have gone home, I'm not sure. \" Pt seems slightly confused; unsure as I haven't seen her before and don't know her baseline. Per ortho notes, pre ROBERT, ROM was 0-95*. No formal measurement from ROBERT in record, but photo was obtained during ROBERT showed just a couple inches between calf and thigh to demonstrate knee flexion ROM. She was given pain meds. 10/10/22 Pt reports knee is feeling about the same. Knee has been popping a little bit when walking/bending. RTW scheduled for Nov 5th    OBJECTIVE: See eval  Observation: incision healing well but adhesions noted;  Girth measurements: L 43 cm @ joint line; 45 cm @ superior patella; R 47 cm @ joint line; 48 cm @ superior patella;  9/15/22 R 46 cm @ joint line; 48 cm @ superior patella;  9/22/22  R 46 cm @ joint line; 47 cm @ superior patella;     Palpation: hypersensitive/tender around knee joint generally into lower leg;     Functional Mobility/Transfers: ind but slow and cautious w/ R LE for transfers;     Posture: ant tilt , increased lumbar lordosis, genu valgus noted     Gait- ambulates with min limp noted using SPC, decreased stance phase and shortened stride length;               PROM AROM 9/30- after manipulation     L R L R 10/6/22    Hip Flexion 110 100 100 100    Knee Flexion 120 120 120 120 seated EOB w/ PT op  (110 pre surg) (Supine) AAROM  L= 130*  R= 90-95*    Knee Extension 0 -15 0 0 (-20 pre surg) Bilat lacks approx 5*         9/6/22 pre surg measurements  Strength (0-5) Left Right   Hip Flexion - seated 5 4   Hip Abduction - sidelyling 5 4   Hip Extension 5 4   Quads 27# 9/6/22 Poor (17#)   Hamstrings 5 4   Ankle DF 5 4   Ankle PF 5 4           Flexibility       Hamstrings (90/90) -10 -15   ITB (Maicol) tight tight   Quads (Ely's) tighit 90   Hip Flexor (Jerry) tight tight           Girth       Mid patella 44 45   Suprapatellar 48 49      Test measurements:      RESTRICTIONS/PRECAUTIONS: colon cancer; recent R TKA    Exercises/Interventions:     *Exercises is bold completed 10/10/22    Exercise/Equipment Resistance/Repetitions Other comments   Cardio/Warm-up     Bike     Treadmill     NuStep X5 min warm up Level 4   Stretching     Hamstring Seated [id number]    Hip Flexion     ITB     Grion     Quad     Inclined Calf [id number]    Towel Pull         ROM     Passive     Active Assisted at side of table 5\"x15 w/ foot on floor and sliding on board AAROM Beginning and end of session; goals to achieve symmetry    Rolling on SB using strap 10\"x10     Prone stretch w/ towel 30 secx3     Knee flexion step stretch 30 sec x3     AROM  5\" X10 sitting off edge of table    Weight Shift    Weight Hangs     Sheet Pulls     Heel slide Supine w/ belt 2 min Ankle Pumps           Patellar Glides     Medial     Superior     Inferior          STRENGTH     SLR     Supine 3x10    Standing hip ext    Abduction    Standing abd    Adducton    SLR+          Isometrics     Quad sets 10 x 10sec over foam roll Supine bilat; cuing for end range   Hip Add ball squeeze    CKC     Calf raises    Wall sits     Step ups 4\" x15 Focus on knee ext   Prone hamstring curl x10    1 leg stand     Squatting @ bars x20    CC TKE Blue TB 10\"x10    Balance        PRE     Extension RANGE:   Flexion RANGE:   Leg Press RANGE:        Cable Column     Ed on POC, expectations and goals         Manual/Modalities     Fluid movement and scar massage    manual 10' PROM w/ OP, knee extension stretch with overpressure, quad STM, hamstring STM, gentle patellar mobs         Therapeutic Exercise and NMR EXR  [x] (69281) Provided verbal/tactile cueing for activities related to strengthening, flexibility, endurance, ROM for improvements in LE, proximal hip, and core control with self care, mobility, lifting, ambulation. [x] (68698) Provided verbal/tactile cueing for activities related to improving balance, coordination, kinesthetic sense, posture, motor skill, proprioception  to assist with LE, proximal hip, and core control in self care, mobility, lifting, ambulation and eccentric single leg control.      NMR and Therapeutic Activities:    [x] (78899 or 84314) Provided verbal/tactile cueing for activities related to improving balance, coordination, kinesthetic sense, posture, motor skill, proprioception and motor activation to allow for proper function of core, proximal hip and LE with self care and ADLs  [] (25509) Gait Re-education- Provided training and instruction to the patient for proper LE, core and proximal hip recruitment and positioning and eccentric body weight control with ambulation re-education including up and down stairs     Home Exercise Program:    [x] (05352) Reviewed/Progressed HEP activities related to strengthening, flexibility, endurance, ROM of core, proximal hip and LE for functional self-care, mobility, lifting and ambulation/stair navigation            Written HEP est    Access Code: [email or web address]  URL: [email or web address]  Date: 09/06/2022  Prepared by: Kae Ferrer    Exercises  Long Sitting Calf Stretch with Strap - 2 x daily - 7 x weekly - 1 sets - 10 reps - 10 hold  Seated Table Hamstring Stretch - 2 x daily - 7 x weekly - 1 sets - 10 reps - 10 hold  Long Sitting Quad Set - 2 x daily - 7 x weekly - 1 sets - 10 reps - 10 hold  Active Straight Leg Raise with Quad Set - 2 x daily - 7 x weekly - 4 sets - 5 reps  Supine Hip Adduction Isometric with Ball - 2 x daily - 7 x weekly - 1 sets - 10 reps - 10 hold  Standing Terminal Knee Extension with Resistance - 2 x daily - 7 x weekly - 1 sets - 10 reps - 10 hold  Seated Knee Flexion AAROM - 2 x daily - 7 x weekly - 1 sets - 10 reps - 10 hold      [] (71585)Reviewed/Progressed HEP activities related to improving balance, coordination, kinesthetic sense, posture, motor skill, proprioception of core, proximal hip and LE for self care, mobility, lifting, and ambulation/stair navigation      Manual Treatments:  PROM / STM / Oscillations-Mobs:  G-I, II, III, IV (PA's, Inf., Post.)  [x] (59615) Provided manual therapy to mobilize LE, proximal hip and/or LS spine soft tissue/joints for the purpose of modulating pain, promoting relaxation,  increasing ROM, reducing/eliminating soft tissue swelling/inflammation/restriction, improving soft tissue extensibility and allowing for proper ROM for normal function with self care, mobility, lifting and ambulation. Modalities:     [x] GAME READY (VASO)- for significant edema, swelling, pain control. Charges:  Timed Code Treatment Minutes: 36 (8 man, 30 TE)   Total Treatment Minutes: 40     [] EVAL (LOW) 44389 (typically 20 minutes face-to-face)  [] EVAL (MOD) 78868 (typically 30 minutes face-to-face)  [] EVAL (HIGH) 68736 (typically 45 minutes face-to-face)  [] RE-EVAL   [x] BV(68565)  x 2  [] IONTO  [] NMR (92714) x    [x] VASO 10 min - low pressure  [x] Manual (23549) x 1     [] Other:GT using SPC  [] TA (67492) x    [] Mech Traction (52154)  [] ES(attended) (65070)      [] ES (un) (04840):       GOALS:  Patient stated goal: full function of R knee to return to work  [] Progressing: [] Met: [] Not Met: [] Adjusted     Therapist goals for Patient:   Short Term Goals: To be achieved in: 2 weeks  1. Independent in HEP and progression per patient tolerance, in order to prevent re-injury. [] Progressing: [] Met: [] Not Met: [] Adjusted  2. Patient will have a decrease in pain to facilitate improvement in movement, function, and ADLs as indicated by Functional Deficits. [] Progressing: [] Met: [] Not Met: [] Adjusted     Long Term Goals: To be achieved in: 12 weeks  1. Functional index score of 61 or more for FOTO to assist with reaching prior level of function. [] Progressing: [] Met: [] Not Met: [] Adjusted  2. Patient will demonstrate increased AROM to 0-125 deg to allow for proper joint functioning as indicated by patients Functional Deficits. [] Progressing: [] Met: [] Not Met: [] Adjusted  3. Patient will demonstrate an increase in Strength to good proximal hip strength and control, within 5lb HHD in LE to allow for proper functional mobility as indicated by patients Functional Deficits. [] Progressing: [] Met: [] Not Met: [] Adjusted  4. Patient will return to 90% functional activities without increased symptoms or restriction. [] Progressing: [] Met: [] Not Met: [] Adjusted  5.  Pt will be able to return to work w/ min to no limitations standing 8 hr/day. (patient specific functional goal)    [] Progressing: [] Met: [] Not Met: [] Adjusted    Overall Progression Towards Functional goals/ Treatment Progress Update:  [] Patient is progressing as expected towards functional goals listed. [] Progression is slowed due to complexities/Impairments listed. [] Progression has been slowed due to co-morbidities. [x] Plan just implemented, too soon to assess goals progression <30days   [] Goals require adjustment due to lack of progress  [] Patient is not progressing as expected and requires additional follow up with physician  [] Other    ASSESSMENT: Patient continues to demonstrate improving active and passive ROM. Improved tolerance to passive overpressure both flexion and extension. Patient would benefit from continued skilled physical therapy to improve ROM and strength s/p manipulation following R TKA. Treatment/Activity Tolerance:  [] Patient tolerated treatment well [] Patient limited by fatique  [x] Patient limited by pain  [] Patient limited by other medical complications  [] Other:     Prognosis: [x] Good [] Fair  [] Poor    Patient Requires Follow-up: [x] Yes  [] No    PLAN: Cont therapy after manipulation w/ focus on motion. [x] Continue per plan of care [] Alter current plan (see comments)  [] Plan of care initiated [] Hold pending MD visit [] Discharge    Electronically signed by: Ana Rosa Merida PT, DPT      Note: If patient does not return for scheduled/ recommended follow up visits, this note will serve as a discharge from care along with most recent update on progress.

## 2022-10-12 ENCOUNTER — HOSPITAL ENCOUNTER (OUTPATIENT)
Dept: PHYSICAL THERAPY | Age: 72
Setting detail: THERAPIES SERIES
Discharge: HOME | End: 2022-10-12
Payer: MEDICARE

## 2022-10-12 NOTE — FLOWSHEET NOTE
[facility] [place]  Phone: [phone]   Fax:     [phone]    Physical Therapy  Cancellation/No-show Note  Patient Name:  Zuly Newman  :  1950   Date:  10/12/2022  Cancelled visits to date: 1  No-shows to date: 0    Patient status for today's appointment patient:  [x]  Cancelled  []  Rescheduled appointment  []  No-show     Reason given by patient:  [x]  Patient ill  []  Conflicting appointment  []  No transportation    []  Conflict with work  []  No reason given  []  Other:     Comments:      Phone call information:   []  Phone call made today to patient at _ time at number provided:      []  Patient answered, conversation as follows:    []  Patient did not answer, message left as follows:  []  Phone call not made today    Electronically signed by:   Julissa Castro PT

## 2022-10-14 ENCOUNTER — APPOINTMENT (OUTPATIENT)
Dept: PHYSICAL THERAPY | Age: 72
End: 2022-10-14
Payer: MEDICARE

## 2022-10-18 ENCOUNTER — HOSPITAL ENCOUNTER (OUTPATIENT)
Dept: PHYSICAL THERAPY | Age: 72
Setting detail: THERAPIES SERIES
Discharge: HOME | End: 2022-10-18
Payer: MEDICARE

## 2022-10-18 PROCEDURE — 97530 THERAPEUTIC ACTIVITIES: CPT | Performed by: PHYSICAL THERAPIST

## 2022-10-18 PROCEDURE — 97110 THERAPEUTIC EXERCISES: CPT | Performed by: PHYSICAL THERAPIST

## 2022-10-18 NOTE — FLOWSHEET NOTE
[facility] [place]  Phone: [phone]   Fax: [phone]        Physical Therapy Treatment Note/ Progress Report:     Date:  10/18/2022  Patient Name:  Ama Drake    :  1950  MRN: [id number]  Restrictions/Precautions:  hx of cancer; recent surg  Medical/Treatment Diagnosis Information:  Diagnosis: M17.11 (ICD-10-CM) - Primary osteoarthritis of right knee   Treatment Diagnosis: decreased abilty to ambulate and function   DOS 22   R TKA  Date of Manipulation 22 R knee  Insurance/Certification information:   [facility] [place]  Physician Information:   Dr. John Perez  Has the plan of care been signed (Y/N):        [x]  Yes  []  No     Date of Patient follow up with Physician: 10/21/2022      Is this a Progress Report:     []  Yes  [x]  No        If Yes:  Date Range for reporting period:  Beginning 22  Ending    Progress report will be due (10 Rx or 30 days whichever is less):        Recertification will be due (POC Duration  / 90 days whichever is less): 22         Visit # Insurance Allowable Auth Required   12 BMN [x]  Yes []  No        Functional Scale: FOTO 31; LEFS 17.7    Date assessed:  9/6/22  10/6/22 FOTO 60      Latex Allergy:  [x]NO      []YES  Preferred Language for Healthcare:   [x]English       []other:    Pain level:  4/10 after manipulation     SUBJECTIVE:  8 + weeks s/p TKA; 2+ week post manipulation  Pt feels that knee is stiff on arrival but doing well yesterday. She is riding her stationary bike at home w/o problems. RTW scheduled for     OBJECTIVE: See eval  Observation: incision healing well but adhesions noted;  Girth measurements: L 43 cm @ joint line; 45 cm @ superior patella; R 47 cm @ joint line; 48 cm @ superior patella;  9/15/22 R 46 cm @ joint line; 48 cm @ superior patella;  22  R 46 cm @ joint line; 47 cm @ superior patella;     Palpation: hypersensitive/tender around knee joint generally into lower leg;     Functional Mobility/Transfers: indep but slow and cautious w/ R LE for transfers;     Posture: ant tilt, increased lumbar lordosis, genu valgus noted     Gait- ambulates with min limp noted using SPC, decreased stance phase and shortened stride length;               PROM AROM 9/30- after manipulation     L R L R 10/18/22    Hip Flexion 110 100 100 100    Knee Flexion 120 120 120  121 deg On step stretch  (110 pre surg) (Supine) AAROM  L= 130*  R= 90-95*    Knee Extension 0 -15 0 0 (-20 pre surg) Bilat lacks approx 5*         9/6/22 pre surg measurements  Strength (0-5) Left Right   Hip Flexion - seated 5 4   Hip Abduction - sidelyling 5 4   Hip Extension 5 4   Quads 27# 9/6/22 Poor (17#)   Hamstrings 5 4   Ankle DF 5 4   Ankle PF 5 4           Flexibility       Hamstrings (90/90) -10 -15   ITB (Maicol) tight tight   Quads (Ely's) tighit 90   Hip Flexor (Jerry) tight tight           Girth       Mid patella 44 45   Suprapatellar 48 49      Test measurements:      RESTRICTIONS/PRECAUTIONS: colon cancer; recent R TKA    Exercises/Interventions:     *Exercises is bold completed 10/10/22    Exercise/Equipment Resistance/Repetitions Other comments   Cardio/Warm-up     Bike     Treadmill     NuStep X5 min warm up Level 4   Stretching     Hamstring Seated [id number]    Hip Flexion     ITB     Grion     Quad     Inclined Calf [id number]    Towel Pull         ROM     Passive     Active Assisted at side of table 5\"x15 w/ foot on floor and sliding on board     AAROM Beginning and end of session; goals to achieve symmetry    Rolling on SB using strap 10\"x10             AROM     Weight Shift    Weight Hangs     Sheet Pulls     Heel slide Ankle Pumps           Patellar Glides     Medial     Superior     Inferior          STRENGTH     SLR     Supine 3x10    Standing hip ext    Abduction    Standing abd    Adducton    SLR+          Isometrics     Quad sets 10 x 10sec over foam roll Supine bilat; cuing for end range   Hip Add ball squeeze    CKC     Calf raises    Wall sits     Step ups & over 4\" x15 Focus on knee ext   Lateral step ups 4\"x15    Prone hamstring curl    1 leg stand     Squatting @ bars x20    CC TKE    Balance         SLS 30\"x2    PRE     Extension RANGE:   Flexion RANGE:   Leg Press RANGE:        Cable Column     Ed on POC, expectations and goals 10/18/22 reviewed priorities to focus on strengthening now that ROM goal achieved         Gt training X5' focus on step overs w/ TKE when [facility]         Manual/Modalities     Fluid movement and scar massage    manual 1        Therapeutic Exercise and NMR EXR  [x] (70138) Provided verbal/tactile cueing for activities related to strengthening, flexibility, endurance, ROM for improvements in LE, proximal hip, and core control with self care, mobility, lifting, ambulation. [x] (45082) Provided verbal/tactile cueing for activities related to improving balance, coordination, kinesthetic sense, posture, motor skill, proprioception  to assist with LE, proximal hip, and core control in self care, mobility, lifting, ambulation and eccentric single leg control.      NMR and Therapeutic Activities:    [x] (70743 or 54959) Provided verbal/tactile cueing for activities related to improving balance, coordination, kinesthetic sense, posture, motor skill, proprioception and motor activation to allow for proper function of core, proximal hip and LE with self care and ADLs  [] (69723) Gait Re-education- Provided training and instruction to the patient for proper LE, core and proximal hip recruitment and positioning and eccentric body weight control with ambulation re-education including up and down stairs     Home Exercise Program:    [x] (43968) Reviewed/Progressed HEP activities related to strengthening, flexibility, endurance, ROM of core, proximal hip and LE for functional self-care, mobility, lifting and ambulation/stair navigation Written HEP est    Access Code: [email or web address]  URL: [email or web address]  Date: 09/06/2022  Prepared by: Kd Cazares    Exercises  Long Sitting Calf Stretch with Strap - 2 x daily - 7 x weekly - 1 sets - 10 reps - 10 hold  Seated Table Hamstring Stretch - 2 x daily - 7 x weekly - 1 sets - 10 reps - 10 hold  Long Sitting Quad Set - 2 x daily - 7 x weekly - 1 sets - 10 reps - 10 hold  Active Straight Leg Raise with Quad Set - 2 x daily - 7 x weekly - 4 sets - 5 reps  Supine Hip Adduction Isometric with Ball - 2 x daily - 7 x weekly - 1 sets - 10 reps - 10 hold  Standing Terminal Knee Extension with Resistance - 2 x daily - 7 x weekly - 1 sets - 10 reps - 10 hold  Seated Knee Flexion AAROM - 2 x daily - 7 x weekly - 1 sets - 10 reps - 10 hold      [] (32273)Reviewed/Progressed HEP activities related to improving balance, coordination, kinesthetic sense, posture, motor skill, proprioception of core, proximal hip and LE for self care, mobility, lifting, and ambulation/stair navigation      Manual Treatments:  PROM / STM / Oscillations-Mobs:  G-I, II, III, IV (PA's, Inf., Post.)  [x] (35067) Provided manual therapy to mobilize LE, proximal hip and/or LS spine soft tissue/joints for the purpose of modulating pain, promoting relaxation,  increasing ROM, reducing/eliminating soft tissue swelling/inflammation/restriction, improving soft tissue extensibility and allowing for proper ROM for normal function with self care, mobility, lifting and ambulation. Modalities:     [x] GAME READY (VASO)- for significant edema, swelling, pain control.      Charges:  Timed Code Treatment Minutes: 50   Total Treatment Minutes: 50     [] EVAL (LOW) 19748 (typically 20 minutes face-to-face)  [] EVAL (MOD) 26243 (typically 30 minutes face-to-face)  [] EVAL (HIGH) 86877 (typically 45 minutes face-to-face)  [] RE-EVAL   [x] YC(02642)  x 30  [] IONTO  [] NMR (51483) x  5'  [] VASO 10 min - low pressure  [] Manual (80294) x 1     [] Other:GT x5'  [x] TA (04035) x 10'   [] Mech Traction (61512)  [] ES(attended) (11437)      [] ES (un) (62370):       GOALS:  Patient stated goal: full function of R knee to return to work  [] Progressing: [] Met: [] Not Met: [] Adjusted     Therapist goals for Patient:   Short Term Goals: To be achieved in: 2 weeks  1. Independent in HEP and progression per patient tolerance, in order to prevent re-injury. [] Progressing: [] Met: [] Not Met: [] Adjusted  2. Patient will have a decrease in pain to facilitate improvement in movement, function, and ADLs as indicated by Functional Deficits. [] Progressing: [] Met: [] Not Met: [] Adjusted     Long Term Goals: To be achieved in: 12 weeks  1. Functional index score of 61 or more for FOTO to assist with reaching prior level of function. [] Progressing: [] Met: [] Not Met: [] Adjusted  2. Patient will demonstrate increased AROM to 0-125 deg to allow for proper joint functioning as indicated by patients Functional Deficits. [] Progressing: [] Met: [] Not Met: [] Adjusted  3. Patient will demonstrate an increase in Strength to good proximal hip strength and control, within 5lb HHD in LE to allow for proper functional mobility as indicated by patients Functional Deficits. [] Progressing: [] Met: [] Not Met: [] Adjusted  4. Patient will return to 90% functional activities without increased symptoms or restriction. [] Progressing: [] Met: [] Not Met: [] Adjusted  5. Pt will be able to return to work w/ min to no limitations standing 8 hr/day. (patient specific functional goal)    [] Progressing: [] Met: [] Not Met: [] Adjusted    Overall Progression Towards Functional goals/ Treatment Progress Update:  [] Patient is progressing as expected towards functional goals listed. [] Progression is slowed due to complexities/Impairments listed. [] Progression has been slowed due to co-morbidities.   [x] Plan just implemented, too soon to assess goals progression <30days [] Goals require adjustment due to lack of progress  [] Patient is not progressing as expected and requires additional follow up with physician  [] Other    ASSESSMENT: R knee ROM goals met today w/ min stretching needed. Quad contraction fair + but fatigued quickly w/ program. Therapy adjusted to strengthening exercises for function. Pt did have fatigue from program but good control w/ concentration needed on steps. Eccentric control challenging for pt on steps. Pt is progressing well today toward goals. Treatment/Activity Tolerance:  [] Patient tolerated treatment well [] Patient limited by fatique  [x] Patient limited by pain  [] Patient limited by other medical complications  [] Other:     Prognosis: [x] Good [] Fair  [] Poor    Patient Requires Follow-up: [x] Yes  [] No    PLAN: Cont to monitor ROM but progress program for strengthening and function with RTW date in 2 weeks. [x] Continue per plan of care [] Alter current plan (see comments)  [] Plan of care initiated [] Hold pending MD visit [] Discharge    Electronically signed by: Zuleika Bruno, PT, MS, OMT-C    Physical Therapist [place] license #[id number]  Physical Therapist [place] license #[id number]       Note: If patient does not return for scheduled/ recommended follow up visits, this note will serve as a discharge from care along with most recent update on progress.

## 2022-10-20 ENCOUNTER — HOSPITAL ENCOUNTER (OUTPATIENT)
Dept: PHYSICAL THERAPY | Age: 72
Setting detail: THERAPIES SERIES
Discharge: HOME | End: 2022-10-20
Payer: MEDICARE

## 2022-10-20 PROCEDURE — 97110 THERAPEUTIC EXERCISES: CPT | Performed by: PHYSICAL THERAPIST

## 2022-10-20 PROCEDURE — 97530 THERAPEUTIC ACTIVITIES: CPT | Performed by: PHYSICAL THERAPIST

## 2022-10-20 NOTE — FLOWSHEET NOTE
[facility] [place]  Phone: [phone]   Fax: [phone]        Physical Therapy Treatment Note/ Progress Report:     Date:  10/20/2022  Patient Name:  Ace Perales    :  1950  MRN: [id number]  Restrictions/Precautions:  hx of cancer; recent surg  Medical/Treatment Diagnosis Information:  Diagnosis: M17.11 (ICD-10-CM) - Primary osteoarthritis of right knee   Treatment Diagnosis: decreased abilty to ambulate and function   DOS 22   R TKA  Date of Manipulation 22 R knee  Insurance/Certification information:   [facility] [place] [place] Medicare  Physician Information:   Dr. Dalton Clarity  Has the plan of care been signed (Y/N):        [x]  Yes  []  No     Date of Patient follow up with Physician: 10/21/2022      Is this a Progress Report:     []  Yes  [x]  No        If Yes:  Date Range for reporting period:  Beginning 22  Ending    Progress report will be due (10 Rx or 30 days whichever is less): 82       Recertification will be due (POC Duration  / 90 days whichever is less): 22         Visit # Insurance Allowable Auth Required   13 BMN [x]  Yes []  No        Functional Scale: FOTO 31; LEFS 17.7    Date assessed:  9/6/22  10/6/22 FOTO 60      Latex Allergy:  [x]NO      []YES  Preferred Language for Healthcare:   [x]English       []other:    Pain level:  0/10     SUBJECTIVE:  8 + weeks s/p TKA; 2+ week post manipulation  Pt has been doing well at home and denies any soreness after last session. She has concern about walking long distances and if knee can do it. RTW scheduled for     OBJECTIVE: See eval  Observation: incision healing well but adhesions noted;  Girth measurements: L 43 cm @ joint line; 45 cm @ superior patella; R 47 cm @ joint line; 48 cm @ superior patella;  9/15/22 R 46 cm @ joint line; 48 cm @ superior patella;  22  R 46 cm @ joint line; 47 cm @ superior patella;     Palpation: Adducton    SLR+          Isometrics     Quad sets Supine bilat; cuing for end range   Hip Add ball squeeze    CKC     Calf raises    Wall sits 30\"x3    Step ups & over 8\" x15 Focus on knee ext   Lateral step ups    Prone hamstring curl    1 leg stand     Squatting @ bars x20    CC TKE    Balance         SLS 30\"x2- airex    PRE     Extension RANGE:   Flexion RANGE:   Leg Press RANGE:        Cable Column     Ed on POC, expectations and goals 10/20/22         Gt training x5' focus on step overs w/ TKE when [facility]         Manual/Modalities     Fluid movement and scar massage    manual 1        Therapeutic Exercise and NMR EXR  [x] (56949) Provided verbal/tactile cueing for activities related to strengthening, flexibility, endurance, ROM for improvements in LE, proximal hip, and core control with self care, mobility, lifting, ambulation. [x] (76851) Provided verbal/tactile cueing for activities related to improving balance, coordination, kinesthetic sense, posture, motor skill, proprioception  to assist with LE, proximal hip, and core control in self care, mobility, lifting, ambulation and eccentric single leg control.      NMR and Therapeutic Activities:    [x] (81701 or 64952) Provided verbal/tactile cueing for activities related to improving balance, coordination, kinesthetic sense, posture, motor skill, proprioception and motor activation to allow for proper function of core, proximal hip and LE with self care and ADLs  [] (25926) Gait Re-education- Provided training and instruction to the patient for proper LE, core and proximal hip recruitment and positioning and eccentric body weight control with ambulation re-education including up and down stairs     Home Exercise Program:    [x] (08817) Reviewed/Progressed HEP activities related to strengthening, flexibility, endurance, ROM of core, proximal hip and LE for functional self-care, mobility, lifting and ambulation/stair navigation            Written HEP est    Access Code: [email or web address]  URL: [email or web address]  Date: 09/06/2022  Prepared by: Zuleika Bruno    Exercises  Long Sitting Calf Stretch with Strap - 2 x daily - 7 x weekly - 1 sets - 10 reps - 10 hold  Seated Table Hamstring Stretch - 2 x daily - 7 x weekly - 1 sets - 10 reps - 10 hold  Long Sitting Quad Set - 2 x daily - 7 x weekly - 1 sets - 10 reps - 10 hold  Active Straight Leg Raise with Quad Set - 2 x daily - 7 x weekly - 4 sets - 5 reps  Supine Hip Adduction Isometric with Ball - 2 x daily - 7 x weekly - 1 sets - 10 reps - 10 hold  Standing Terminal Knee Extension with Resistance - 2 x daily - 7 x weekly - 1 sets - 10 reps - 10 hold  Seated Knee Flexion AAROM - 2 x daily - 7 x weekly - 1 sets - 10 reps - 10 hold      [] (96906)Reviewed/Progressed HEP activities related to improving balance, coordination, kinesthetic sense, posture, motor skill, proprioception of core, proximal hip and LE for self care, mobility, lifting, and ambulation/stair navigation      Manual Treatments:  PROM / STM / Oscillations-Mobs:  G-I, II, III, IV (PA's, Inf., Post.)  [x] (35831) Provided manual therapy to mobilize LE, proximal hip and/or LS spine soft tissue/joints for the purpose of modulating pain, promoting relaxation,  increasing ROM, reducing/eliminating soft tissue swelling/inflammation/restriction, improving soft tissue extensibility and allowing for proper ROM for normal function with self care, mobility, lifting and ambulation. Modalities:     [x] GAME READY (VASO)- for significant edema, swelling, pain control.      Charges:  Timed Code Treatment Minutes: 50   Total Treatment Minutes: 50     [] EVAL (LOW) 18194 (typically 20 minutes face-to-face)  [] EVAL (MOD) 11250 (typically 30 minutes face-to-face)  [] EVAL (HIGH) 77923 (typically 45 minutes face-to-face)  [] RE-EVAL   [x] CK(94752)  x 30  [] IONTO  [] NMR (57029) x  5'  [] VASO 10 min - low pressure  [] Manual (45011) x 1     [] Other:GT x5'  [x] TA (85283) x 10'   [] [facility] Traction (04817)  [] ES(attended) (51074)      [] ES (un) (14023):       GOALS:  Patient stated goal: full function of R knee to return to work  [] Progressing: [] Met: [] Not Met: [] Adjusted     Therapist goals for Patient:   Short Term Goals: To be achieved in: 2 weeks  1. Independent in HEP and progression per patient tolerance, in order to prevent re-injury. [] Progressing: [] Met: [] Not Met: [] Adjusted  2. Patient will have a decrease in pain to facilitate improvement in movement, function, and ADLs as indicated by Functional Deficits. [] Progressing: [] Met: [] Not Met: [] Adjusted     Long Term Goals: To be achieved in: 12 weeks  1. Functional index score of 61 or more for FOTO to assist with reaching prior level of function. [] Progressing: [] Met: [] Not Met: [] Adjusted  2. Patient will demonstrate increased AROM to 0-125 deg to allow for proper joint functioning as indicated by patients Functional Deficits. [] Progressing: [] Met: [] Not Met: [] Adjusted  3. Patient will demonstrate an increase in Strength to good proximal hip strength and control, within 5lb HHD in LE to allow for proper functional mobility as indicated by patients Functional Deficits. [] Progressing: [] Met: [] Not Met: [] Adjusted  4. Patient will return to 90% functional activities without increased symptoms or restriction. [] Progressing: [] Met: [] Not Met: [] Adjusted  5. Pt will be able to return to work w/ min to no limitations standing 8 hr/day. (patient specific functional goal)    [] Progressing: [] Met: [] Not Met: [] Adjusted    Overall Progression Towards Functional goals/ Treatment Progress Update:  [] Patient is progressing as expected towards functional goals listed. [] Progression is slowed due to complexities/Impairments listed. [] Progression has been slowed due to co-morbidities.   [x] Plan just implemented, too soon to assess goals progression <30days   [] Goals require adjustment due to lack of progress  [] Patient is not progressing as expected and requires additional follow up with physician  [] Other    ASSESSMENT: R knee ROM goals met today w/ min stretching needed. Strength progressing in R LE. Progressed to greater height of steps but needing UE to assist w/ stepping down for control. This was pt's last appt scheduled and she thought she was finished w/ therapy but discussed her continuing therapy to address deficits on steps and with walking. Pt having some signs of apprehension w/ movement of R knee or palpation. Treatment/Activity Tolerance:  [] Patient tolerated treatment well [] Patient limited by fatique  [x] Patient limited by pain  [] Patient limited by other medical complications  [] Other:     Prognosis: [x] Good [] Fair  [] Poor    Patient Requires Follow-up: [x] Yes  [] No    PLAN: Cont to monitor ROM but progress program for strengthening and function with RTW date in 2 weeks. [x] Continue per plan of care [] Alter current plan (see comments)  [] Plan of care initiated [] Hold pending MD visit [] Discharge    Electronically signed by: Annalisa Yip, PT, MS, OMT-C    Physical Therapist [place] license #[id number]  Physical Therapist [place] license #[id number]       Note: If patient does not return for scheduled/ recommended follow up visits, this note will serve as a discharge from care along with most recent update on progress.

## 2022-10-21 ENCOUNTER — OFFICE VISIT (OUTPATIENT)
Dept: ORTHOPEDIC SURGERY | Age: 72
End: 2022-10-21

## 2022-10-21 VITALS — BODY MASS INDEX: 28.88 KG/M2 | WEIGHT: 184 LBS | RESPIRATION RATE: 16 BRPM | HEIGHT: 67 IN

## 2022-10-21 DIAGNOSIS — Z96.651 STATUS POST TOTAL RIGHT KNEE REPLACEMENT: Primary | ICD-10-CM

## 2022-10-21 PROCEDURE — 99024 POSTOP FOLLOW-UP VISIT: CPT | Performed by: ORTHOPAEDIC SURGERY

## 2022-10-21 NOTE — PROGRESS NOTES
[facility]  Office Visit    Chief Complaint: Follow-up s/p right total knee arthroplasty    HPI:  Deuce So is a 66 y.o. who is here in follow-up of right total knee arthroplasty performed on August 17, 2022. She underwent manipulation under anesthesia for arthrofibrosis on September 30, 2022. She comes in today in follow-up. She walks with use of a cane but also walks without a cane. She continues to be active in physical therapy. She is taking minimal pain medications. Patient Active Problem List   Diagnosis    Mixed hyperlipidemia    Colon polyp    Essential hypertension    Class 1 obesity due to excess calories without serious comorbidity with body mass index (BMI) of 33.0 to 33.9 in adult    History of colon cancer, stage I    Benign neoplasm of skin of face    Ganglion    Hypertrophic and atrophic condition of skin    Lumbar stenosis    Vitamin D deficiency    Family history of type 2 diabetes mellitus    Carotid artery calcification, bilateral    Prediabetes    Atherosclerosis of abdominal aorta (HCC)    RUQ pain    Primary osteoarthritis of right knee    Arthritis of right knee       ROS:  Constitutional: denies fever, chills, weight loss  MSK: denies pain in other joints, muscle aches  Neurological: denies numbness, tingling, weakness    Exam:  Appearance: sitting in exam room chair, appears to be in no acute distress, awake and alert  Resp: unlabored breathing on room air  Skin: warm, dry and intact with out erythema or significant increased temperature  Neuro: grossly intact both lower extremities. Intact sensation to light touch. Motor exam 4+ to 5/5 in all major motor groups. Right knee: Incision is healed. Active knee range of motion is 0 to 125 degrees. Sensation is intact light touch. There is brisk capillary refill. There is 5/5 muscle strength in all muscle groups. Mild diffuse swelling of the leg.     Imaging:  None    Assessment:  S/p right total knee arthroplasty with arthrofibrosis s/p manipulation    Plan:  She is doing very well postoperatively at this point. She will continue work with physical therapy and then transition to home exercises. She is scheduled to return to work on November 10 with no restrictions. She will follow-up in 1 month for repeat assessment and radiographs. This dictation was done with [place] [facility]ist dictation and may contain mechanical errors related to translation.

## 2022-10-25 ENCOUNTER — HOSPITAL ENCOUNTER (OUTPATIENT)
Dept: PHYSICAL THERAPY | Age: 72
Setting detail: THERAPIES SERIES
Discharge: HOME | End: 2022-10-25
Payer: MEDICARE

## 2022-10-25 PROCEDURE — 97110 THERAPEUTIC EXERCISES: CPT | Performed by: PHYSICAL THERAPIST

## 2022-10-25 PROCEDURE — 97530 THERAPEUTIC ACTIVITIES: CPT | Performed by: PHYSICAL THERAPIST

## 2022-10-25 NOTE — FLOWSHEET NOTE
[facility] [address]  Phone: [phone]   Fax: [phone]        Physical Therapy Treatment Note/ Progress Report:     Date:  10/25/2022  Patient Name:  Rafe Hatchet    :  1950  MRN: [id number]  Restrictions/Precautions:  hx of cancer; recent surg  Medical/Treatment Diagnosis Information:  Diagnosis: M17.11 (ICD-10-CM) - Primary osteoarthritis of right knee   Treatment Diagnosis: decreased abilty to ambulate and function   DOS 22   R TKA  Date of Manipulation 22 R knee  Insurance/Certification information:   [facility] [place]  Physician Information:   Dr. German Packer  Has the plan of care been signed (Y/N):        [x]  Yes  []  No     Date of Patient follow up with Physician: 10/21/2022      Is this a Progress Report:     []  Yes  [x]  No        If Yes:  Date Range for reporting period:  Beginning 22  Ending    Progress report will be due (10 Rx or 30 days whichever is less):        Recertification will be due (POC Duration  / 90 days whichever is less): 22         Visit # Insurance Allowable Auth Required   13 BMN [x]  Yes []  No        Functional Scale: FOTO 31; LEFS 17.7    Date assessed:  9/6/22  10/6/22 FOTO 60      Latex Allergy:  [x]NO      []YES  Preferred Language for Healthcare:   [x]English       []other:    Pain level:  0/10     SUBJECTIVE:  9 + weeks s/p TKA; 3+ week post manipulation  Pt has had a cramp in her R LE since Sat w/o known cause. Pt is frustrated w/ soreness. RTW scheduled for     OBJECTIVE: See eval  Observation: incision healing well but adhesions noted;  Girth measurements: L 43 cm @ joint line; 45 cm @ superior patella; R 47 cm @ joint line; 48 cm @ superior patella;  9/15/22 R 46 cm @ joint line; 48 cm @ superior patella;  22  R 46 cm @ joint line; 47 cm @ superior patella;     Palpation: hypersensitive/tender around knee joint generally into lower leg;  10/25/22 tightness noted in calf muscles w/ tenderness present;      Functional Mobility/Transfers: indep but slow and cautious w/ R LE for transfers;     Posture: ant tilt, increased lumbar lordosis, genu valgus noted     Gait- ambulates with min limp noted using SPC, decreased stance phase and shortened stride length;               PROM AROM 9/30- after manipulation     L R L R 10/20/22    Hip Flexion 110 100 100 100    Knee Flexion 120 120 120  122 deg On step stretch  (110 pre surg) (Supine) AAROM  L= 130*  R= 90-95*    Knee Extension 0 -15 0 0 (-20 pre surg) Bilat lacks approx 5*         10/20/22  Strength (0-5) Left Right   Hip Flexion - seated 5 4+   Hip Abduction - sidelyling 5 4   Hip Extension 5 4   Quads 32#   24# (17#)   Hamstrings 5 4+   Ankle DF 5 5   Ankle PF 5 5           Flexibility       Hamstrings (90/90) -10 -15   ITB (Maicol) tight tight   Quads (Ely's) tighit 90   Hip Flexor (Jerry) tight tight           Girth       Mid patella 44 45   Suprapatellar 48 49      Test measurements:      RESTRICTIONS/PRECAUTIONS: colon cancer; recent R TKA    Exercises/Interventions:   Exercise/Equipment Resistance/Repetitions Other comments   Cardio/Warm-up     Bike     Treadmill     NuStep X5 min warm up Level 4   Stretching     Hamstring Seated [id number]    Hip Flexion     ITB     Grion     Quad     Inclined Calf [id number]    Towel Pull         ROM     Passive     Active Assisted at side of table 5\"x15 w/ foot on floor and sliding on board     AAROM Beginning and end of session; goals to achieve symmetry    Rolling on SB using strap 10\"x10             AROM     Weight Shift    Weight Hangs     Sheet Pulls     Heel slide Ankle Pumps           Patellar Glides     Medial     Superior     Inferior          STRENGTH     SLR     Supine 1x10 blue TB standing Blue TB given for home   Standing hip ext    Abduction    Standing abd Blue TB 2x10 standing    Adducton    SLR+          Isometrics     Quad sets Supine bilat; cuing for end range   Hip Add ball squeeze    CKC     Calf raises    Wall sits    Step ups & over 6\" & 8\"x10 10/25/22 focus on concentration for quads   Lateral step ups    Prone hamstring curl    1 leg stand     Squatting @ bars x20    CC TKE Blue TB 10\"x10    Balance         SLS 30\"x2- airex    PRE     Extension RANGE:   Flexion RANGE:   Leg Press RANGE:        Cable Column     Ed on POC, expectations and goals 10/25/22 reviewed HEP         Gt training x5' focus on step overs w/ TKE when WBing         Manual/Modalities     Fluid movement and scar massage    manual 1        Therapeutic Exercise and NMR EXR  [x] (44491) Provided verbal/tactile cueing for activities related to strengthening, flexibility, endurance, ROM for improvements in LE, proximal hip, and core control with self care, mobility, lifting, ambulation. [x] (13856) Provided verbal/tactile cueing for activities related to improving balance, coordination, kinesthetic sense, posture, motor skill, proprioception  to assist with LE, proximal hip, and core control in self care, mobility, lifting, ambulation and eccentric single leg control.      NMR and Therapeutic Activities:    [x] (57057 or 57616) Provided verbal/tactile cueing for activities related to improving balance, coordination, kinesthetic sense, posture, motor skill, proprioception and motor activation to allow for proper function of core, proximal hip and LE with self care and ADLs  [] (12909) Gait Re-education- Provided training and instruction to the patient for proper LE, core and proximal hip recruitment and positioning and eccentric body weight control with ambulation re-education including up and down stairs     Home Exercise Program:    [x] (27193) Reviewed/Progressed HEP activities related to strengthening, flexibility, endurance, ROM of core, proximal hip and LE for functional self-care, mobility, lifting and ambulation/stair navigation            Written HEP est    Access Code: [email or web address]  URL: [email or web address]  Date: 09/06/2022  Prepared by: Annalisa Mercer    Exercises  Long Sitting Calf Stretch with Strap - 2 x daily - 7 x weekly - 1 sets - 10 reps - 10 hold  Seated Table Hamstring Stretch - 2 x daily - 7 x weekly - 1 sets - 10 reps - 10 hold  Long Sitting Quad Set - 2 x daily - 7 x weekly - 1 sets - 10 reps - 10 hold  Active Straight Leg Raise with Quad Set - 2 x daily - 7 x weekly - 4 sets - 5 reps  Supine Hip Adduction Isometric with Ball - 2 x daily - 7 x weekly - 1 sets - 10 reps - 10 hold  Standing Terminal Knee Extension with Resistance - 2 x daily - 7 x weekly - 1 sets - 10 reps - 10 hold  Seated Knee Flexion AAROM - 2 x daily - 7 x weekly - 1 sets - 10 reps - 10 hold      [] (13962)Reviewed/Progressed HEP activities related to improving balance, coordination, kinesthetic sense, posture, motor skill, proprioception of core, proximal hip and LE for self care, mobility, lifting, and ambulation/stair navigation      Manual Treatments:  PROM / STM / Oscillations-Mobs:  G-I, II, III, IV (PA's, Inf., Post.)  [x] (54522) Provided manual therapy to mobilize LE, proximal hip and/or LS spine soft tissue/joints for the purpose of modulating pain, promoting relaxation,  increasing ROM, reducing/eliminating soft tissue swelling/inflammation/restriction, improving soft tissue extensibility and allowing for proper ROM for normal function with self care, mobility, lifting and ambulation. Modalities:     [x] GAME READY (VASO)- for significant edema, swelling, pain control.      Charges:  Timed Code Treatment Minutes: 40   Total Treatment Minutes: 40     [] EVAL (LOW) 17412 (typically 20 minutes face-to-face)  [] EVAL (MOD) 98400 (typically 30 minutes face-to-face)  [] EVAL (HIGH) 40960 (typically 45 minutes face-to-face)  [] RE-EVAL   [x] BC(35660)  x 30  [] IONTO  [] NMR (64388) x  5'  [] VASO 10 min - low pressure  [] Manual (14595) x 1     [] Other:GT x5'  [x] TA (60179) x 10'   [] [facility] Traction (42723)  [] ES(attended) (83491)      [] ES (un) (77034):       GOALS:  Patient stated goal: full function of R knee to return to work  [] Progressing: [] Met: [] Not Met: [] Adjusted     Therapist goals for Patient:   Short Term Goals: To be achieved in: 2 weeks  1. Independent in HEP and progression per patient tolerance, in order to prevent re-injury. [] Progressing: [] Met: [] Not Met: [] Adjusted  2. Patient will have a decrease in pain to facilitate improvement in movement, function, and ADLs as indicated by Functional Deficits. [] Progressing: [] Met: [] Not Met: [] Adjusted     Long Term Goals: To be achieved in: 12 weeks  1. Functional index score of 61 or more for FOTO to assist with reaching prior level of function. [] Progressing: [] Met: [] Not Met: [] Adjusted  2. Patient will demonstrate increased AROM to 0-125 deg to allow for proper joint functioning as indicated by patients Functional Deficits. [] Progressing: [] Met: [] Not Met: [] Adjusted  3. Patient will demonstrate an increase in Strength to good proximal hip strength and control, within 5lb HHD in LE to allow for proper functional mobility as indicated by patients Functional Deficits. [] Progressing: [] Met: [] Not Met: [] Adjusted  4. Patient will return to 90% functional activities without increased symptoms or restriction. [] Progressing: [] Met: [] Not Met: [] Adjusted  5. Pt will be able to return to work w/ min to no limitations standing 8 hr/day. (patient specific functional goal)    [] Progressing: [] Met: [] Not Met: [] Adjusted    Overall Progression Towards Functional goals/ Treatment Progress Update:  [] Patient is progressing as expected towards functional goals listed. [] Progression is slowed due to complexities/Impairments listed. [] Progression has been slowed due to co-morbidities.   [x] Plan just implemented, too soon to assess goals progression <30days   [] Goals require adjustment due to lack of progress  [] Patient is not progressing as expected and requires additional follow up with physician  [] Other    ASSESSMENT: Pt struggles w/ anxiety w/ moving knee today due to elevated pain from spasms recently. Pt was able to bend the knee but hesitant to bend too much. General tightness noted in calf and hamstrings. Patella shows limited movement today in all directions. Extensive time spend on quad contraction in [address] position. Skills on steps improved w/ practice and pt able to perform 8 in step w/o UE assistance. Reviewed importance of quad contraction to avoid compensation by HS in gt and function. Treatment/Activity Tolerance:  [] Patient tolerated treatment well [] Patient limited by fatique  [x] Patient limited by pain  [] Patient limited by other medical complications  [] Other:     Prognosis: [x] Good [] Fair  [] Poor    Patient Requires Follow-up: [x] Yes  [] No    PLAN: Cont to monitor ROM but progress program for strengthening and function with RTW date in 2 weeks. [x] Continue per plan of care [] Alter current plan (see comments)  [] Plan of care initiated [] Hold pending MD visit [] Discharge    Electronically signed by: Mohini Nelson, PT, MS, OMT-C    Physical Therapist [id number] [address] license #[id number]  Physical Therapist [place] license #[id number]       Note: If patient does not return for scheduled/ recommended follow up visits, this note will serve as a discharge from care along with most recent update on progress.

## 2022-10-28 ENCOUNTER — HOSPITAL ENCOUNTER (OUTPATIENT)
Dept: PHYSICAL THERAPY | Age: 72
Setting detail: THERAPIES SERIES
Discharge: HOME | End: 2022-10-28
Payer: MEDICARE

## 2022-10-28 PROCEDURE — 97110 THERAPEUTIC EXERCISES: CPT | Performed by: PHYSICAL THERAPIST

## 2022-10-28 PROCEDURE — 97530 THERAPEUTIC ACTIVITIES: CPT | Performed by: PHYSICAL THERAPIST

## 2022-10-28 NOTE — FLOWSHEET NOTE
[facility] [address]  Phone: [phone]   Fax: [phone]        Physical Therapy Treatment Note/ Progress Report:     Date:  10/28/2022  Patient Name:  Ava Whitman    :  1950  MRN: [id number]  Restrictions/Precautions:  hx of cancer; recent surg  Medical/Treatment Diagnosis Information:  Diagnosis: M17.11 (ICD-10-CM) - Primary osteoarthritis of right knee   Treatment Diagnosis: decreased abilty to ambulate and function   DOS 22   R TKA  Date of Manipulation 22 R knee  Insurance/Certification information:   [employer]  Physician Information:   Dr. Rico Machado  Has the plan of care been signed (Y/N):        [x]  Yes  []  No     Date of Patient follow up with Physician: 10/21/2022      Is this a Progress Report:     []  Yes  [x]  No        If Yes:  Date Range for reporting period:  Beginning 22  Ending    Progress report will be due (10 Rx or 30 days whichever is less): 16/3/28       Recertification will be due (POC Duration  / 90 days whichever is less): 22         Visit # Insurance Allowable Auth Required   13 BMN [x]  Yes []  No        Functional Scale: FOTO 31; LEFS 17.7    Date assessed:  9/6/22  10/6/22 FOTO 60      Latex Allergy:  [x]NO      []YES  Preferred Language for Healthcare:   [x]English       []other:    Pain level:  0/10     SUBJECTIVE:  9 + weeks s/p TKA; 3+ week post manipulation  Pt reports that she went shopping yesterday    RTW scheduled for     OBJECTIVE: See eval  Observation: incision healing well but adhesions noted;  Girth measurements: L 43 cm @ joint line; 45 cm @ superior patella; R 47 cm @ joint line; 48 cm @ superior patella;  9/15/22 R 46 cm @ joint line; 48 cm @ superior patella;  22  R 46 cm @ joint line; 47 cm @ superior patella;     Palpation: hypersensitive/tender around knee joint generally into lower leg;  10/25/22 tightness noted in calf muscles w/ tenderness present;      Functional Mobility/Transfers: indep but slow and cautious w/ R LE for transfers;     Posture: ant tilt, increased lumbar lordosis, genu valgus noted     Gait- ambulates with min limp noted using SPC, decreased stance phase and shortened stride length;               PROM AROM 9/30- after manipulation     L R L R 10/28/22    Hip Flexion 110 100 100 100    Knee Flexion 120 120 120  122 deg On step stretch  (110 pre surg) (Supine) AAROM  L= 130*  R= 90-95*    Knee Extension 0 -15 0 0 (-20 pre surg) Bilat lacks approx 5*         10/20/22  Strength (0-5) Left Right   Hip Flexion - seated 5 4+   Hip Abduction - sidelyling 5 4   Hip Extension 5 4   Quads 32#   24# (17#)   Hamstrings 5 4+   Ankle DF 5 5   Ankle PF 5 5           Flexibility       Hamstrings (90/90) -10 -15   ITB (Maicol) tight tight   Quads (Ely's) tighit 90   Hip Flexor (Jerry) tight tight           Girth       Mid patella 44 45   Suprapatellar 48 49      Test measurements:      RESTRICTIONS/PRECAUTIONS: colon cancer; recent R TKA    Exercises/Interventions:   Exercise/Equipment Resistance/Repetitions Other comments   Cardio/Warm-up     Bike     Treadmill     NuStep X5 min warm up Level 4   Stretching     Hamstring Seated [id number]    Hip Flexion     ITB     Grion     Quad     Inclined Calf [id number]    Towel Pull         ROM     Passive     Active Assisted at side of table 5\"x15 w/ foot on floor and sliding on board     AAROM Beginning and end of session; goals to achieve symmetry    Rolling on SB using strap 10\"x10             AROM     Weight Shift    Weight Hangs     Sheet Pulls     Heel slide Ankle Pumps           Patellar Glides     Medial     Superior     Inferior          STRENGTH     SLR     Supine 1x10 blue TB standing Blue TB given for home   Standing hip ext    Abduction    Standing abd Blue TB 2x10 standing    Adducton    SLR+          Isometrics     Quad sets Supine bilat; cuing for end range   Hip Add ball squeeze    CKC Calf raises 30    Wall sits 30\"x3    Step ups & over 6\" & 8\"x10 10/25/22 focus on concentration for quads   Lateral step ups    Prone hamstring curl    1 leg stand     Squatting @ bars x20    CC TKE Blue TB 10\"x10    Balance         SLS 30\"x2- airex    PRE     Extension RANGE:   Flexion RANGE:   Leg Press 75# 2x10 B LE  60# x15 R LE RANGE:        Cable Column     Ed on POC, expectations and goals 10/25/22 reviewed HEP         Gt training x5' focus on step overs w/ TKE when WBing         Manual/Modalities     Fluid movement and scar massage    manual 1        Therapeutic Exercise and NMR EXR  [x] (17342) Provided verbal/tactile cueing for activities related to strengthening, flexibility, endurance, ROM for improvements in LE, proximal hip, and core control with self care, mobility, lifting, ambulation. [x] (81168) Provided verbal/tactile cueing for activities related to improving balance, coordination, kinesthetic sense, posture, motor skill, proprioception  to assist with LE, proximal hip, and core control in self care, mobility, lifting, ambulation and eccentric single leg control.      NMR and Therapeutic Activities:    [x] (11790 or 36253) Provided verbal/tactile cueing for activities related to improving balance, coordination, kinesthetic sense, posture, motor skill, proprioception and motor activation to allow for proper function of core, proximal hip and LE with self care and ADLs  [] (57246) Gait Re-education- Provided training and instruction to the patient for proper LE, core and proximal hip recruitment and positioning and eccentric body weight control with ambulation re-education including up and down stairs     Home Exercise Program:    [x] (16468) Reviewed/Progressed HEP activities related to strengthening, flexibility, endurance, ROM of core, proximal hip and LE for functional self-care, mobility, lifting and ambulation/stair navigation            Written HEP est    Access Code: [email or web address]  URL: [email or web address]  Date: 09/06/2022  Prepared by: Nicole Fulton    Exercises  Long Sitting Calf Stretch with Strap - 2 x daily - 7 x weekly - 1 sets - 10 reps - 10 hold  Seated Table Hamstring Stretch - 2 x daily - 7 x weekly - 1 sets - 10 reps - 10 hold  Long Sitting Quad Set - 2 x daily - 7 x weekly - 1 sets - 10 reps - 10 hold  Active Straight Leg Raise with Quad Set - 2 x daily - 7 x weekly - 4 sets - 5 reps  Supine Hip Adduction Isometric with Ball - 2 x daily - 7 x weekly - 1 sets - 10 reps - 10 hold  Standing Terminal Knee Extension with Resistance - 2 x daily - 7 x weekly - 1 sets - 10 reps - 10 hold  Seated Knee Flexion AAROM - 2 x daily - 7 x weekly - 1 sets - 10 reps - 10 hold      [] (93616)Reviewed/Progressed HEP activities related to improving balance, coordination, kinesthetic sense, posture, motor skill, proprioception of core, proximal hip and LE for self care, mobility, lifting, and ambulation/stair navigation      Manual Treatments:  PROM / STM / Oscillations-Mobs:  G-I, II, III, IV (PA's, Inf., Post.)  [x] (45217) Provided manual therapy to mobilize LE, proximal hip and/or LS spine soft tissue/joints for the purpose of modulating pain, promoting relaxation,  increasing ROM, reducing/eliminating soft tissue swelling/inflammation/restriction, improving soft tissue extensibility and allowing for proper ROM for normal function with self care, mobility, lifting and ambulation. Modalities:     [] GAME READY (VASO)- for significant edema, swelling, pain control.      Charges:  Timed Code Treatment Minutes: 45   Total Treatment Minutes: 45     [] EVAL (LOW) 69867 (typically 20 minutes face-to-face)  [] EVAL (MOD) 71751 (typically 30 minutes face-to-face)  [] EVAL (HIGH) 17997 (typically 45 minutes face-to-face)  [] RE-EVAL   [x] PC(71490)  x 30  [] IONTO  [] NMR (40562) x  5'  [] VASO 10 min - low pressure  [] Manual (60812) x      [] Other:GT x5'  [x] TA (00666) x 10'   [] [employer] Traction (47725)  [] ES(attended) (62659)      [] ES (un) (06315):       GOALS:  Patient stated goal: full function of R knee to return to work  [] Progressing: [] Met: [] Not Met: [] Adjusted     Therapist goals for Patient:   Short Term Goals: To be achieved in: 2 weeks  1. Independent in HEP and progression per patient tolerance, in order to prevent re-injury. [] Progressing: [] Met: [] Not Met: [] Adjusted  2. Patient will have a decrease in pain to facilitate improvement in movement, function, and ADLs as indicated by Functional Deficits. [] Progressing: [] Met: [] Not Met: [] Adjusted     Long Term Goals: To be achieved in: 12 weeks  1. Functional index score of 61 or more for FOTO to assist with reaching prior level of function. [] Progressing: [] Met: [] Not Met: [] Adjusted  2. Patient will demonstrate increased AROM to 0-125 deg to allow for proper joint functioning as indicated by patients Functional Deficits. [] Progressing: [] Met: [] Not Met: [] Adjusted  3. Patient will demonstrate an increase in Strength to good proximal hip strength and control, within 5lb HHD in LE to allow for proper functional mobility as indicated by patients Functional Deficits. [] Progressing: [] Met: [] Not Met: [] Adjusted  4. Patient will return to 90% functional activities without increased symptoms or restriction. [] Progressing: [] Met: [] Not Met: [] Adjusted  5. Pt will be able to return to work w/ min to no limitations standing 8 hr/day. (patient specific functional goal)    [] Progressing: [] Met: [] Not Met: [] Adjusted    Overall Progression Towards Functional goals/ Treatment Progress Update:  [] Patient is progressing as expected towards functional goals listed. [] Progression is slowed due to complexities/Impairments listed. [] Progression has been slowed due to co-morbidities.   [x] Plan just implemented, too soon to assess goals progression <30days   [] Goals require adjustment due to lack of progress  [] Patient is not progressing as expected and requires additional follow up with physician  [] Other    ASSESSMENT: ROM in R knee progressing easily w/ pt able to maintain between sessions. Pt does struggle w/ active quad contraction w/ function. Gt training and cuing on steps given to contract quads and not use momentum to get up on steps. Balance challenging on R LE vs L. Pt had less anxiety today as pain was reduced. Progression on muscle activation and strengthening vs motion now that pt is able to reach motion goals consistently. Treatment/Activity Tolerance:  [] Patient tolerated treatment well [] Patient limited by fatique  [x] Patient limited by pain  [] Patient limited by other medical complications  [] Other:     Prognosis: [x] Good [] Fair  [] Poor    Patient Requires Follow-up: [x] Yes  [] No    PLAN: Cont to monitor ROM but progress program for strengthening and function. Pt will be on vacation next week and then returning to work the next week. Pt did schedule one more appt for follow up after RTW. [x] Continue per plan of care [] Alter current plan (see comments)  [] Plan of care initiated [] Hold pending MD visit [] Discharge    Electronically signed by: Cristina Miller, PT, MS, OMT-C    Physical Therapist [place] license #[id number]  Physical Therapist [place] license #[id number]       Note: If patient does not return for scheduled/ recommended follow up visits, this note will serve as a discharge from care along with most recent update on progress.

## 2022-11-10 NOTE — RESULT ENCOUNTER NOTE
Your mammogram did not show signs of cancer. You should have a repeat mammogram in one year. Remember to do monthly self breast exams. Notify me if you notice any lumps, hardening or the skin or changes.

## 2022-11-11 ENCOUNTER — OFFICE VISIT (OUTPATIENT)
Dept: ORTHOPEDIC SURGERY | Age: 72
End: 2022-11-11

## 2022-11-11 ENCOUNTER — HOSPITAL ENCOUNTER (OUTPATIENT)
Dept: PHYSICAL THERAPY | Age: 72
Setting detail: THERAPIES SERIES
Discharge: HOME | End: 2022-11-11
Payer: MEDICARE

## 2022-11-11 VITALS — HEIGHT: 66 IN | BODY MASS INDEX: 28.45 KG/M2 | WEIGHT: 177 LBS

## 2022-11-11 DIAGNOSIS — Z96.651 STATUS POST TOTAL RIGHT KNEE REPLACEMENT: Primary | ICD-10-CM

## 2022-11-11 PROCEDURE — 97110 THERAPEUTIC EXERCISES: CPT | Performed by: PHYSICAL THERAPIST

## 2022-11-11 PROCEDURE — 99024 POSTOP FOLLOW-UP VISIT: CPT | Performed by: ORTHOPAEDIC SURGERY

## 2022-11-11 PROCEDURE — 97530 THERAPEUTIC ACTIVITIES: CPT | Performed by: PHYSICAL THERAPIST

## 2022-11-11 NOTE — PROGRESS NOTES
[facility]  Office Visit    Chief Complaint: Follow-up s/p right total knee arthroplasty    HPI:  Herman Nguyen is a 67 y.o. who is here in follow-up of right total knee arthroplasty performed on August 17, 2022. She underwent manipulation under anesthesia for arthrofibrosis on September 30, 2022. She comes in today in follow-up. She has a cane with her today but is not minimally using it. She has her last physical therapy session today. She reports improved range of motion and pain that she rates as 2/10. Patient Active Problem List   Diagnosis    Mixed hyperlipidemia    Colon polyp    Essential hypertension    Class 1 obesity due to excess calories without serious comorbidity with body mass index (BMI) of 33.0 to 33.9 in adult    History of colon cancer, stage I    Benign neoplasm of skin of face    Ganglion    Hypertrophic and atrophic condition of skin    Lumbar stenosis    Vitamin D deficiency    Family history of type 2 diabetes mellitus    Carotid artery calcification, bilateral    Prediabetes    Atherosclerosis of abdominal aorta (HCC)    RUQ pain    Primary osteoarthritis of right knee    Arthritis of right knee       ROS:  Constitutional: denies fever, chills, weight loss  MSK: denies pain in other joints, muscle aches  Neurological: denies numbness, tingling, weakness    Exam:  Appearance: sitting in exam room chair, appears to be in no acute distress, awake and alert  Resp: unlabored breathing on room air  Skin: warm, dry and intact with out erythema or significant increased temperature  Neuro: grossly intact both lower extremities. Intact sensation to light touch. Motor exam 4+ to 5/5 in all major motor groups. Right knee: Incision is healed. Active knee range of motion is 0 to 125 degrees. Sensation is intact light touch. There is brisk capillary refill. There is 5/5 muscle strength in all muscle groups. Mild diffuse swelling of the leg.     Imaging:  3 views of the right knee were performed and interpreted today. Significant for total knee arthroplasty prosthesis in place with no signs of osteolysis, loosening, fracture, dislocation. Assessment:  S/p right total knee arthroplasty with arthrofibrosis s/p manipulation    Plan:  She is doing very well postoperatively at this point. She will complete physical therapy and transition to home exercise program.  Follow-up 1 year from the time of surgery in August 2023 or sooner if needed. We discussed with the patient today the use of antibiotic prophylaxis prior to any dental procedures. We discussed that the antibiotic prophylaxis would be used to help prevent periprosthetic joint infections. If they were not offered antibiotics by the physician performing the procedure, they should contact our office that we may prescribe them antibiotics. This dictation was done with Dragon dictation and may contain mechanical errors related to translation.

## 2022-11-11 NOTE — FLOWSHEET NOTE
[facility] [address]  Phone: [phone]   Fax: [phone]        Physical Therapy Treatment Note/ Progress Report/Discharge Note:     Date:  2022  Patient Name:  Ama Drake    :  1950  MRN: [id number]  Restrictions/Precautions:  hx of cancer; recent surg  Medical/Treatment Diagnosis Information:  Diagnosis: M17.11 (ICD-10-CM) - Primary osteoarthritis of right knee   Treatment Diagnosis: decreased abilty to ambulate and function   DOS 22   R TKA  Date of Manipulation 22 R knee  Insurance/Certification information:   [place] [employer]  Physician Information:   Dr. John Perez  Has the plan of care been signed (Y/N):        [x]  Yes  []  No     Date of Patient follow up with Physician: today      Is this a Progress Report:     []  Yes  [x]  No        If Yes:  Date Range for reporting period:  Beginning 22  Ending    Progress report will be due (10 Rx or 30 days whichever is less):        Recertification will be due (POC Duration  / 90 days whichever is less): 22         Visit # Insurance Allowable Auth Required   14 BMN [x]  Yes []  No        Functional Scale: FOTO 31; LEFS 17.7    Date assessed:  9/6/22  10/6/22 FOTO 60  22 FOTO      Latex Allergy:  [x]NO      []YES  Preferred Language for Healthcare:   [x]English       []other:    Pain level:  0/10     SUBJECTIVE:  11 + weeks s/p TKA; 5+ week post manipulation  Pt reports that her cruise was good w/o problems. She has RTW w/o problems. She did have some stiffness last night after a full day but today the knee feels great w/o problems. She feels that the bending is going well.  Pt     RTW scheduled for - yesterday    OBJECTIVE: See eval  Observation: incision healing well but adhesions noted;  Girth measurements: L 43 cm @ joint line; 45 cm @ superior patella; R 47 cm @ joint line; 48 cm @ superior patella;  9/15/22 R 46 cm @ joint line; 48 cm @ superior patella;  9/22/22  R 46 cm @ joint line; 47 cm @ superior patella; Palpation: normal tension in calf and hamstrings; min edema in R knee generally;     Functional Mobility/Transfers: indep with transfers;     Posture: ant tilt, increased lumbar lordosis, genu valgus noted     Gait- ambulates without limp noted using SPC, normal gt pattern;              PROM AROM     L R L R 11/11/22   Hip Flexion 110 100 100 100   Knee Flexion 120 120 120  122 deg On step stretch  (110 pre surg)   Knee Extension 0 0 0 0 (-20 pre surg)         10/20/22  Strength (0-5) Left Right   Hip Flexion - seated 5 4+   Hip Abduction - sidelyling 5 4   Hip Extension 5 4   Quads 41# 28# (17#)   Hamstrings 5 4+   Ankle DF 5 5   Ankle PF 5 5           Flexibility       Hamstrings (90/90) -10 -15   ITB (Maicol) tight tight   Quads (Ely's) tighit 90   Hip Flexor (Jerry) tight tight           Girth       Mid patella 44 45   Suprapatellar 48 49      Test measurements:      RESTRICTIONS/PRECAUTIONS: colon cancer; recent R TKA    Exercises/Interventions:   Exercise/Equipment Resistance/Repetitions Other comments   Cardio/Warm-up     Bike     Treadmill     NuStep X5 min warm up Level 4   Stretching     Hamstring Seated [id number]    Hip Flexion     ITB     Grion     Quad     Inclined Calf 40xpee7    Towel Pull         ROM     Passive     Active Assisted at side of table 5\"x15 w/ foot on floor and sliding on board     AAROM Beginning and end of session; goals to achieve symmetry    Rolling on SB using strap 10\"x10             AROM     Weight Shift    Weight Hangs     Sheet Pulls     Heel slide Ankle Pumps           Patellar Glides     Medial     Superior     Inferior          STRENGTH     SLR     Supine 1x10 blue TB standing Blue TB given for home   Standing hip ext    Abduction    Standing abd Blue TB 2x10 standing    Adducton    SLR+          Isometrics     Quad sets Supine bilat; cuing for end range   Hip Add ball squeeze CKC     Calf raises 30    Wall sits 30\"x3    Step ups & over  8\"x10 10/25/22 focus on concentration for quads   Lateral step ups    Prone hamstring curl    1 leg stand     Squatting Sit to stand to chair    CC TKE    Balance         SLS 30\"x2- airex    PRE     Extension RANGE:   Flexion RANGE:   Leg Press 75# 2x10 B LE  60# x15 R LE RANGE:        Cable Column     Ed on POC, expectations and goals 11/11/22 reviewed HEP         Gt training x5' focus on step overs w/ TKE when WBing         Manual/Modalities     Fluid movement and scar massage    manual 1        Therapeutic Exercise and NMR EXR  [x] (13910) Provided verbal/tactile cueing for activities related to strengthening, flexibility, endurance, ROM for improvements in LE, proximal hip, and core control with self care, mobility, lifting, ambulation. [x] (96253) Provided verbal/tactile cueing for activities related to improving balance, coordination, kinesthetic sense, posture, motor skill, proprioception  to assist with LE, proximal hip, and core control in self care, mobility, lifting, ambulation and eccentric single leg control.      NMR and Therapeutic Activities:    [x] (35989 or 97499) Provided verbal/tactile cueing for activities related to improving balance, coordination, kinesthetic sense, posture, motor skill, proprioception and motor activation to allow for proper function of core, proximal hip and LE with self care and ADLs  [] (75473) Gait Re-education- Provided training and instruction to the patient for proper LE, core and proximal hip recruitment and positioning and eccentric body weight control with ambulation re-education including up and down stairs     Home Exercise Program:    [x] (80497) Reviewed/Progressed HEP activities related to strengthening, flexibility, endurance, ROM of core, proximal hip and LE for functional self-care, mobility, lifting and ambulation/stair navigation            Written HEP est    Access Code: [email or web address]  URL: [email or web address]  Date: 09/06/2022  Prepared by: Akilah Kent    Exercises  Long Sitting Calf Stretch with Strap - 2 x daily - 7 x weekly - 1 sets - 10 reps - 10 hold  Seated Table Hamstring Stretch - 2 x daily - 7 x weekly - 1 sets - 10 reps - 10 hold  Long Sitting Quad Set - 2 x daily - 7 x weekly - 1 sets - 10 reps - 10 hold  Active Straight Leg Raise with Quad Set - 2 x daily - 7 x weekly - 4 sets - 5 reps  Supine Hip Adduction Isometric with Ball - 2 x daily - 7 x weekly - 1 sets - 10 reps - 10 hold  Standing Terminal Knee Extension with Resistance - 2 x daily - 7 x weekly - 1 sets - 10 reps - 10 hold  Seated Knee Flexion AAROM - 2 x daily - 7 x weekly - 1 sets - 10 reps - 10 hold      [] (62531)Reviewed/Progressed HEP activities related to improving balance, coordination, kinesthetic sense, posture, motor skill, proprioception of core, proximal hip and LE for self care, mobility, lifting, and ambulation/stair navigation      Manual Treatments:  PROM / STM / Oscillations-Mobs:  G-I, II, III, IV (PA's, Inf., Post.)  [x] (22140) Provided manual therapy to mobilize LE, proximal hip and/or LS spine soft tissue/joints for the purpose of modulating pain, promoting relaxation,  increasing ROM, reducing/eliminating soft tissue swelling/inflammation/restriction, improving soft tissue extensibility and allowing for proper ROM for normal function with self care, mobility, lifting and ambulation. Modalities:     [] GAME READY (VASO)- for significant edema, swelling, pain control.      Charges:  Timed Code Treatment Minutes: 45   Total Treatment Minutes: 45     [] EVAL (LOW) 27249 (typically 20 minutes face-to-face)  [] EVAL (MOD) 80022 (typically 30 minutes face-to-face)  [] EVAL (HIGH) 66547 (typically 45 minutes face-to-face)  [] RE-EVAL   [x] EO(39206)  x 30  [] IONTO  [] NMR (19872) x  5'  [] VASO 10 min - low pressure  [] Manual (72930) x      [] Other:GT x5'  [x] TA (91153) x 10'   [] Mech Traction (65724)  [] ES(attended) (72682)      [] ES (un) (57147):       GOALS:  Patient stated goal: full function of R knee to return to work  [] Progressing: [] Met: [] Not Met: [] Adjusted     Therapist goals for Patient:   Short Term Goals: To be achieved in: 2 weeks  1. Independent in HEP and progression per patient tolerance, in order to prevent re-injury. [] Progressing: [] Met: [] Not Met: [] Adjusted  2. Patient will have a decrease in pain to facilitate improvement in movement, function, and ADLs as indicated by Functional Deficits. [] Progressing: [] Met: [] Not Met: [] Adjusted     Long Term Goals: To be achieved in: 12 weeks  1. Functional index score of 61 or more for FOTO to assist with reaching prior level of function. [] Progressing: [x] Met: [] Not Met: [] Adjusted  2. Patient will demonstrate increased AROM to 0-125 deg to allow for proper joint functioning as indicated by patients Functional Deficits. [x] Progressing: [] Met: [] Not Met: [] Adjusted  3. Patient will demonstrate an increase in Strength to good proximal hip strength and control, within 5lb HHD in LE to allow for proper functional mobility as indicated by patients Functional Deficits. [x] Progressing: [] Met: [] Not Met: [] Adjusted  4. Patient will return to 90% functional activities without increased symptoms or restriction. [x] Progressing: [x] Met: [] Not Met: [] Adjusted  5. Pt will be able to return to work w/ min to no limitations standing 8 hr/day. (patient specific functional goal)    [] Progressing: [x] Met: [] Not Met: [] Adjusted    Overall Progression Towards Functional goals/ Treatment Progress Update:  [] Patient is progressing as expected towards functional goals listed. [] Progression is slowed due to complexities/Impairments listed. [] Progression has been slowed due to co-morbidities.   [x] Plan just implemented, too soon to assess goals progression <30days   [] Goals require adjustment due to lack of progress  [] Patient is not progressing as expected and requires additional follow up with physician  [] Other    ASSESSMENT: ROM progressing in R knee w/ min stretch needed to reach desired goal. Strength progressing but deviations noted in testing but not on steps. Balance challenging on single leg. Good progress toward goals and should continue to improve as she feels better. Treatment/Activity Tolerance:  [x] Patient tolerated treatment well [] Patient limited by fatique  [] Patient limited by pain  [] Patient limited by other medical complications  [] Other:     Prognosis: [x] Good [] Fair  [] Poor    Patient Requires Follow-up: [x] Yes  [] No    PLAN: Pt will continue w/ HEP to continue to progress R LE function. Pt has PT contact information or questions. D/C to HEP. [] Continue per plan of care [] Alter current plan (see comments)  [] Plan of care initiated [] Hold pending MD visit [x] Discharge    Electronically signed by: Cristina Miller PT, MS, ELIO-C    Physical Therapist [id number] [address] license #[id number]  Physical Therapist [place] license #[id number]       Note: If patient does not return for scheduled/ recommended follow up visits, this note will serve as a discharge from care along with most recent update on progress.

## 2022-12-12 DIAGNOSIS — I10 ESSENTIAL HYPERTENSION: ICD-10-CM

## 2022-12-13 RX ORDER — LISINOPRIL AND HYDROCHLOROTHIAZIDE 20; 12.5 MG/1; MG/1
TABLET ORAL
Qty: 90 TABLET | Refills: 1 | Status: SHIPPED | OUTPATIENT
Start: 2022-12-13

## 2023-01-18 ENCOUNTER — TELEPHONE (OUTPATIENT)
Dept: ORTHOPEDIC SURGERY | Age: 73
End: 2023-01-18

## 2023-01-18 NOTE — TELEPHONE ENCOUNTER
General Question     Subject: ASKING FOR CALL FROM OFFICE   Patient and /or Facility Request: Roman Barriga  Contact Number: [phone]     DID NOT SPECIFY WHAT THE CALL REGARDS. Pt IS [facility] TO [facility] DR [facility]. PLEASE CALL  @ THE # ABOVE.

## 2023-02-06 DIAGNOSIS — E78.2 MIXED HYPERLIPIDEMIA: ICD-10-CM

## 2023-02-07 RX ORDER — ROSUVASTATIN CALCIUM 20 MG/1
TABLET, COATED ORAL
Qty: 90 TABLET | Refills: 3 | Status: SHIPPED | OUTPATIENT
Start: 2023-02-07

## 2023-06-30 ENCOUNTER — TELEPHONE (OUTPATIENT)
Dept: ORTHOPEDIC SURGERY | Age: 73
End: 2023-06-30

## 2023-07-31 DIAGNOSIS — I10 ESSENTIAL HYPERTENSION: ICD-10-CM

## 2023-08-04 RX ORDER — LISINOPRIL AND HYDROCHLOROTHIAZIDE 20; 12.5 MG/1; MG/1
TABLET ORAL
Qty: 90 TABLET | OUTPATIENT
Start: 2023-08-04

## 2023-08-19 SDOH — HEALTH STABILITY: PHYSICAL HEALTH: ON AVERAGE, HOW MANY DAYS PER WEEK DO YOU ENGAGE IN MODERATE TO STRENUOUS EXERCISE (LIKE A BRISK WALK)?: 3 DAYS

## 2023-08-19 SDOH — HEALTH STABILITY: PHYSICAL HEALTH: ON AVERAGE, HOW MANY MINUTES DO YOU ENGAGE IN EXERCISE AT THIS LEVEL?: 60 MIN

## 2023-08-21 ENCOUNTER — OFFICE VISIT (OUTPATIENT)
Dept: INTERNAL MEDICINE CLINIC | Age: 73
End: 2023-08-21

## 2023-08-21 ENCOUNTER — HOSPITAL ENCOUNTER (OUTPATIENT)
Dept: GENERAL RADIOLOGY | Age: 73
Discharge: HOME | End: 2023-08-21
Payer: MEDICARE

## 2023-08-21 ENCOUNTER — HOSPITAL ENCOUNTER (OUTPATIENT)
Age: 73
Discharge: HOME | End: 2023-08-21
Payer: MEDICARE

## 2023-08-21 VITALS
DIASTOLIC BLOOD PRESSURE: 62 MMHG | HEART RATE: 77 BPM | BODY MASS INDEX: 29.86 KG/M2 | OXYGEN SATURATION: 98 % | TEMPERATURE: 98.1 F | WEIGHT: 185 LBS | SYSTOLIC BLOOD PRESSURE: 124 MMHG

## 2023-08-21 DIAGNOSIS — G89.29 CHRONIC LEFT SHOULDER PAIN: ICD-10-CM

## 2023-08-21 DIAGNOSIS — M25.512 CHRONIC LEFT SHOULDER PAIN: ICD-10-CM

## 2023-08-21 DIAGNOSIS — R73.03 PREDIABETES: ICD-10-CM

## 2023-08-21 DIAGNOSIS — I10 ESSENTIAL HYPERTENSION: Primary | Chronic | ICD-10-CM

## 2023-08-21 DIAGNOSIS — G89.29 CHRONIC CERVICAL PAIN: ICD-10-CM

## 2023-08-21 DIAGNOSIS — I70.0 ATHEROSCLEROSIS OF ABDOMINAL AORTA (HCC): ICD-10-CM

## 2023-08-21 DIAGNOSIS — M54.2 CHRONIC CERVICAL PAIN: ICD-10-CM

## 2023-08-21 DIAGNOSIS — E78.2 MIXED HYPERLIPIDEMIA: Chronic | ICD-10-CM

## 2023-08-21 DIAGNOSIS — Z85.038 PERSONAL HISTORY OF COLON CANCER, STAGE II: ICD-10-CM

## 2023-08-21 PROCEDURE — 73030 X-RAY EXAM OF SHOULDER: CPT

## 2023-08-21 PROCEDURE — 72050 X-RAY EXAM NECK SPINE 4/5VWS: CPT

## 2023-08-21 SDOH — ECONOMIC STABILITY: INCOME INSECURITY: HOW HARD IS IT FOR YOU TO PAY FOR THE VERY BASICS LIKE FOOD, HOUSING, MEDICAL CARE, AND HEATING?: NOT VERY HARD

## 2023-08-21 SDOH — ECONOMIC STABILITY: FOOD INSECURITY: WITHIN THE PAST 12 MONTHS, YOU WORRIED THAT YOUR FOOD WOULD RUN OUT BEFORE YOU GOT MONEY TO BUY MORE.: NEVER TRUE

## 2023-08-21 SDOH — ECONOMIC STABILITY: HOUSING INSECURITY
IN THE LAST 12 MONTHS, WAS THERE A TIME WHEN YOU DID NOT HAVE A STEADY PLACE TO SLEEP OR SLEPT IN A SHELTER (INCLUDING NOW)?: NO

## 2023-08-21 SDOH — ECONOMIC STABILITY: FOOD INSECURITY: WITHIN THE PAST 12 MONTHS, THE FOOD YOU BOUGHT JUST DIDN'T LAST AND YOU DIDN'T HAVE MONEY TO GET MORE.: NEVER TRUE

## 2023-08-21 ASSESSMENT — PATIENT HEALTH QUESTIONNAIRE - PHQ9
SUM OF ALL RESPONSES TO PHQ QUESTIONS 1-9: 0
SUM OF ALL RESPONSES TO PHQ9 QUESTIONS 1 & 2: 0
2. FEELING DOWN, DEPRESSED OR HOPELESS: 0
SUM OF ALL RESPONSES TO PHQ QUESTIONS 1-9: 0
SUM OF ALL RESPONSES TO PHQ QUESTIONS 1-9: 0
1. LITTLE INTEREST OR PLEASURE IN DOING THINGS: 0
SUM OF ALL RESPONSES TO PHQ QUESTIONS 1-9: 0

## 2023-08-21 ASSESSMENT — ENCOUNTER SYMPTOMS
CONSTIPATION: 0
SHORTNESS OF BREATH: 0

## 2023-08-21 NOTE — PATIENT INSTRUCTIONS
Xray of neck and shoulder   Shingles vaccine at pharmacy   Complete fasting blood work. Nothing to eat or drink besides water or black coffee for 8 hours prior to blood work.

## 2023-08-21 NOTE — PROGRESS NOTES
Date: 8/21/2023                                               Subjective/Objective:     Chief Complaint   Patient presents with    Establish Care       HPI    Jessica Barcenas is a 66 yo female, visit today to establish care, follow up on chronic conditions. Former patient of Dr Celestine Pearson. She c/o chronic neck pain. Did have ANCELMO about 12 years ago. This pain is intermittent, worse at night. Describes this as a sharp pain. Has limited AROM of the neck. She also has left shoulder pain, thinks this has \"been there,\" isn't sure when it start. Feels like this is radiating down from the neck. She describes this as \"feels like my shoulder is out of place. \" This does not always occur with neck pain. Worse at night. Worse when reaching over head. This is posterior shoulder. She is right hand dominant. Sometimes feels numbness and tingling in the arm. She has been taking aleve which does provide some relief. Hypertension on lisinopril/HCTZ. She does not monitor her BP at home. She denies chest pain, SOB and HA. Hyperlipidemia on rosuvastatin. Colon CA (stage II) s/p right hemicolectomy 2003. Last colonoscopy 1/2021, 5 year follow up recommended. She denies medication side effects.     Gyn:               Last Mammogram: 11/2022  Osteoporosis Screening: normal BMD per DEXA 2019  Exercise: 2-3 times per week; silver sneakers classes; walking              Patient Active Problem List    Diagnosis Date Noted    Arthritis of right knee 08/17/2022    Primary osteoarthritis of right knee 08/08/2022    RUQ pain 06/16/2022    Prediabetes 03/08/2022    Atherosclerosis of abdominal aorta ([address]) 03/08/2022    History of colon cancer, stage I 02/15/2022    Vitamin D deficiency 02/15/2022    Family history of type 2 diabetes mellitus 02/15/2022    Carotid artery calcification, bilateral 02/15/2022    Lumbar stenosis 02/02/2022    Class 1 obesity due to excess calories without serious comorbidity with body mass index (BMI) of

## 2023-08-24 DIAGNOSIS — M50.30 DEGENERATIVE DISC DISEASE, CERVICAL: ICD-10-CM

## 2023-08-24 DIAGNOSIS — M25.512 CHRONIC LEFT SHOULDER PAIN: Primary | ICD-10-CM

## 2023-08-24 DIAGNOSIS — G89.29 CHRONIC LEFT SHOULDER PAIN: Primary | ICD-10-CM

## 2023-08-25 ENCOUNTER — NURSE ONLY (OUTPATIENT)
Dept: INTERNAL MEDICINE CLINIC | Age: 73
End: 2023-08-25

## 2023-08-25 DIAGNOSIS — I10 ESSENTIAL HYPERTENSION: ICD-10-CM

## 2023-08-25 DIAGNOSIS — R73.03 PREDIABETES: ICD-10-CM

## 2023-08-25 DIAGNOSIS — E78.2 MIXED HYPERLIPIDEMIA: Chronic | ICD-10-CM

## 2023-08-25 DIAGNOSIS — I10 ESSENTIAL HYPERTENSION: Chronic | ICD-10-CM

## 2023-08-25 LAB
ALBUMIN SERPL-MCNC: 4.2 G/DL (ref 3.4–5)
ALP SERPL-CCNC: 110 U/L (ref 40–129)
ALT SERPL-CCNC: 13 U/L (ref 10–40)
ANION GAP SERPL CALCULATED.3IONS-SCNC: 12 MMOL/L (ref 3–16)
AST SERPL-CCNC: 13 U/L (ref 15–37)
BILIRUB DIRECT SERPL-MCNC: <0.2 MG/DL (ref 0–0.3)
BILIRUB INDIRECT SERPL-MCNC: ABNORMAL MG/DL (ref 0–1)
BILIRUB SERPL-MCNC: 0.6 MG/DL (ref 0–1)
BUN SERPL-MCNC: 8 MG/DL (ref 7–20)
CALCIUM SERPL-MCNC: 9.5 MG/DL (ref 8.3–10.6)
CHLORIDE SERPL-SCNC: 102 MMOL/L (ref 99–110)
CHOLEST SERPL-MCNC: 139 MG/DL (ref 0–199)
CO2 SERPL-SCNC: 27 MMOL/L (ref 21–32)
CREAT SERPL-MCNC: 0.9 MG/DL (ref 0.6–1.2)
GFR SERPLBLD CREATININE-BSD FMLA CKD-EPI: >60 ML/MIN/{1.73_M2}
GLUCOSE SERPL-MCNC: 104 MG/DL (ref 70–99)
HDLC SERPL-MCNC: 69 MG/DL (ref 40–60)
LDL CHOLESTEROL CALCULATED: 50 MG/DL
POTASSIUM SERPL-SCNC: 4.1 MMOL/L (ref 3.5–5.1)
PROT SERPL-MCNC: 6.5 G/DL (ref 6.4–8.2)
SODIUM SERPL-SCNC: 141 MMOL/L (ref 136–145)
TRIGL SERPL-MCNC: 98 MG/DL (ref 0–150)
VLDLC SERPL CALC-MCNC: 20 MG/DL

## 2023-08-25 RX ORDER — LISINOPRIL AND HYDROCHLOROTHIAZIDE 20; 12.5 MG/1; MG/1
TABLET ORAL
Qty: 90 TABLET | Refills: 1 | Status: SHIPPED | OUTPATIENT
Start: 2023-08-25

## 2023-08-26 LAB
EST. AVERAGE GLUCOSE BLD GHB EST-MCNC: 114 MG/DL
HBA1C MFR BLD: 5.6 %

## 2023-08-31 ENCOUNTER — OFFICE VISIT (OUTPATIENT)
Dept: ORTHOPEDIC SURGERY | Age: 73
End: 2023-08-31
Payer: MEDICARE

## 2023-08-31 VITALS — HEIGHT: 66 IN | BODY MASS INDEX: 29.73 KG/M2 | WEIGHT: 185 LBS | RESPIRATION RATE: 16 BRPM

## 2023-08-31 DIAGNOSIS — Z96.651 STATUS POST TOTAL RIGHT KNEE REPLACEMENT: Primary | ICD-10-CM

## 2023-08-31 PROCEDURE — G8417 CALC BMI ABV UP PARAM F/U: HCPCS | Performed by: PHYSICIAN ASSISTANT

## 2023-08-31 PROCEDURE — G8399 PT W/DXA RESULTS DOCUMENT: HCPCS | Performed by: PHYSICIAN ASSISTANT

## 2023-08-31 PROCEDURE — G8427 DOCREV CUR MEDS BY ELIG CLIN: HCPCS | Performed by: PHYSICIAN ASSISTANT

## 2023-08-31 PROCEDURE — 1036F TOBACCO NON-USER: CPT | Performed by: PHYSICIAN ASSISTANT

## 2023-08-31 PROCEDURE — 99213 OFFICE O/P EST LOW 20 MIN: CPT | Performed by: PHYSICIAN ASSISTANT

## 2023-08-31 PROCEDURE — 3017F COLORECTAL CA SCREEN DOC REV: CPT | Performed by: PHYSICIAN ASSISTANT

## 2023-08-31 PROCEDURE — 1124F ACP DISCUSS-NO DSCNMKR DOCD: CPT | Performed by: PHYSICIAN ASSISTANT

## 2023-08-31 PROCEDURE — 1090F PRES/ABSN URINE INCON ASSESS: CPT | Performed by: PHYSICIAN ASSISTANT

## 2023-09-05 SDOH — HEALTH STABILITY: PHYSICAL HEALTH: ON AVERAGE, HOW MANY MINUTES DO YOU ENGAGE IN EXERCISE AT THIS LEVEL?: 60 MIN

## 2023-09-05 SDOH — HEALTH STABILITY: PHYSICAL HEALTH: ON AVERAGE, HOW MANY DAYS PER WEEK DO YOU ENGAGE IN MODERATE TO STRENUOUS EXERCISE (LIKE A BRISK WALK)?: 2 DAYS

## 2023-09-05 NOTE — PROGRESS NOTES
This dictation was done with picsellon dictation and may contain mechanical errors related to translation. Resp. rate 16, height 5' 6\" (1.676 m), weight 185 lb (83.9 kg), not currently breastfeeding. This is a very pleasant 65-year-old female who had a right total knee replacement on 8/17/2022 and occasionally does have some soreness up to a 4 out of 10 pain in her right knee all in all she has good motion she actually has a little bit of flexion gapping but does fairly well. She was sent for x-rays including AP lateral and a sunrise view of her right knee      Xray three views of the right total knee arthroplasty reveals satisfactory alignment of the prosthesis . No signs of significant polyethylene wear or failure. No progressive radiolucencies,fractures, tumors or dislocations. On examination is a very pleasant 65-year-old female who is alert and oriented x3 she can walk without antalgia has good quad tone has good symmetric motion through the hips negative for straight leg raise neurologically intact to the right foot.     I feel that she has a stable right total knee replacement but needs to continue to work on her quad strengthening she will do activities that we talked about and some exercises and the needs to do more we will send her to physical therapy otherwise we will see her back in 1 year

## 2023-09-08 ENCOUNTER — OFFICE VISIT (OUTPATIENT)
Dept: ORTHOPEDIC SURGERY | Age: 73
End: 2023-09-08

## 2023-09-08 VITALS — HEIGHT: 66 IN | WEIGHT: 185 LBS | BODY MASS INDEX: 29.73 KG/M2

## 2023-09-08 DIAGNOSIS — M19.012 PRIMARY OSTEOARTHRITIS OF LEFT SHOULDER: Primary | ICD-10-CM

## 2023-09-08 RX ORDER — LIDOCAINE HYDROCHLORIDE 10 MG/ML
40 INJECTION, SOLUTION INFILTRATION; PERINEURAL ONCE
Status: COMPLETED | OUTPATIENT
Start: 2023-09-08 | End: 2023-09-08

## 2023-09-08 RX ORDER — TRIAMCINOLONE ACETONIDE 40 MG/ML
40 INJECTION, SUSPENSION INTRA-ARTICULAR; INTRAMUSCULAR ONCE
Status: COMPLETED | OUTPATIENT
Start: 2023-09-08 | End: 2023-09-08

## 2023-09-08 RX ADMIN — TRIAMCINOLONE ACETONIDE 40 MG: 40 INJECTION, SUSPENSION INTRA-ARTICULAR; INTRAMUSCULAR at 10:20

## 2023-09-08 RX ADMIN — LIDOCAINE HYDROCHLORIDE 40 MG: 10 INJECTION, SOLUTION INFILTRATION; PERINEURAL at 10:19

## 2023-09-09 PROBLEM — M19.012 PRIMARY OSTEOARTHRITIS OF LEFT SHOULDER: Status: ACTIVE | Noted: 2023-09-09

## 2023-10-20 ENCOUNTER — OFFICE VISIT (OUTPATIENT)
Dept: INTERNAL MEDICINE CLINIC | Age: 73
End: 2023-10-20

## 2023-10-20 VITALS
WEIGHT: 183.38 LBS | TEMPERATURE: 97.8 F | DIASTOLIC BLOOD PRESSURE: 68 MMHG | SYSTOLIC BLOOD PRESSURE: 126 MMHG | OXYGEN SATURATION: 97 % | BODY MASS INDEX: 29.6 KG/M2 | HEART RATE: 82 BPM

## 2023-10-20 DIAGNOSIS — L30.9 DERMATITIS: Primary | ICD-10-CM

## 2023-10-20 DIAGNOSIS — B00.2 RECURRENT ORAL HERPES SIMPLEX: ICD-10-CM

## 2023-10-20 RX ORDER — METHYLPREDNISOLONE 4 MG/1
TABLET ORAL
Qty: 1 KIT | Refills: 0 | Status: SHIPPED | OUTPATIENT
Start: 2023-10-20 | End: 2023-10-26

## 2023-10-20 RX ORDER — VALACYCLOVIR HYDROCHLORIDE 1 G/1
1 TABLET, FILM COATED ORAL 2 TIMES DAILY
Qty: 42 TABLET | Refills: 1 | Status: SHIPPED | OUTPATIENT
Start: 2023-10-20 | End: 2023-10-20

## 2023-10-20 RX ORDER — HYDROXYZINE HYDROCHLORIDE 25 MG/1
25 TABLET, FILM COATED ORAL EVERY 8 HOURS PRN
Qty: 30 TABLET | Refills: 0 | Status: SHIPPED | OUTPATIENT
Start: 2023-10-20 | End: 2023-10-30

## 2023-10-20 RX ORDER — TRIAMCINOLONE ACETONIDE 0.25 MG/G
OINTMENT TOPICAL
Qty: 80 G | Refills: 0 | Status: SHIPPED | OUTPATIENT
Start: 2023-10-20 | End: 2023-10-27

## 2023-10-20 RX ORDER — VALACYCLOVIR HYDROCHLORIDE 1 G/1
2 TABLET, FILM COATED ORAL 2 TIMES DAILY
Qty: 30 TABLET | Refills: 1 | Status: SHIPPED | OUTPATIENT
Start: 2023-10-20 | End: 2023-10-20

## 2023-10-20 RX ORDER — VALACYCLOVIR HYDROCHLORIDE 1 G/1
2 TABLET, FILM COATED ORAL 2 TIMES DAILY
Qty: 30 TABLET | Refills: 1 | Status: SHIPPED | OUTPATIENT
Start: 2023-10-20 | End: 2023-10-21

## 2023-10-20 NOTE — PATIENT INSTRUCTIONS
Take oral steroid  May use steroid cream once completed with oral steroid  Hydroxyzine as needed for itching   Let me know if worsening or doesn't go away

## 2023-10-20 NOTE — PROGRESS NOTES
equation. Careful  clinical correlation is recommended, particularly when  comparing to results calculated using previous equations. The CKD-EPI equation is less accurate in patients with  extremes of muscle mass, extra-renal metabolism of  creatinine, excessive creatinine ingestion, or following  therapy that affects renal tubular secretion. Calcium 08/25/2023 9.5  8.3 - 10.6 mg/dL Final    Cholesterol, Fasting 08/25/2023 139  0 - 199 mg/dL Final    Triglyceride, Fasting 08/25/2023 98  0 - 150 mg/dL Final    HDL 08/25/2023 69 (H)  40 - 60 mg/dL Final    LDL Calculated 08/25/2023 50  <100 mg/dL Final    VLDL Cholesterol Calculated 08/25/2023 20  Not Established mg/dL Final       Family History   Problem Relation Age of Onset    Other Mother         carotid artery disease in 63\"    Coronary Art Dis Mother     Colon Cancer Father     Stroke Father     Prostate Cancer Father     Diabetes Brother     Kidney Cancer Brother        Current Outpatient Medications   Medication Sig Dispense Refill    triamcinolone (KENALOG) 0.025 % ointment After you complete oral steroid. Apply topically 2 times daily for two weeks. 80 g 0    methylPREDNISolone (MEDROL DOSEPACK) 4 MG tablet Take by mouth. 1 kit 0    hydrOXYzine HCl (ATARAX) 25 MG tablet Take 1 tablet by mouth every 8 hours as needed for Itching 30 tablet 0    valACYclovir (VALTREX) 1 g tablet Take 1 tablet by mouth 2 times daily For one week as needed.  42 tablet 1    lisinopril-hydroCHLOROthiazide (PRINZIDE;ZESTORETIC) 20-12.5 MG per tablet TAKE 1 TABLET EVERY DAY 90 tablet 1    APPLE CIDER VINEGAR PO Take by mouth      rosuvastatin (CRESTOR) 20 MG tablet TAKE 1 TABLET DAILY (CANCEL PRAVASTATIN) 90 tablet 3    Ascorbic Acid (VITAMIN C) 250 MG tablet Take 1 tablet by mouth daily      vitamin B-12 (CYANOCOBALAMIN) 100 MCG tablet Take 0.5 tablets by mouth daily      Cholecalciferol (VITAMIN D3) 1.25 MG (88569 UT) CAPS Take by mouth       No current facility-administered

## 2023-10-30 ENCOUNTER — TELEPHONE (OUTPATIENT)
Dept: INTERNAL MEDICINE CLINIC | Age: 73
End: 2023-10-30

## 2023-11-20 ENCOUNTER — TELEPHONE (OUTPATIENT)
Dept: INTERNAL MEDICINE CLINIC | Age: 73
End: 2023-11-20

## 2023-11-20 NOTE — TELEPHONE ENCOUNTER
----- Message from Dmitry Plata sent at 11/20/2023  1:46 PM EST -----  Subject: Message to Provider    QUESTIONS  Information for Provider? Patient wants to know what rheumatologists your   office would recommend. Did not want me to put a referral request in. She   wants to talk to someone about it.   ---------------------------------------------------------------------------  --------------  [address] [place]  [phone]; OK to leave message on voicemail  ---------------------------------------------------------------------------  --------------  SCRIPT ANSWERS  Relationship to Patient?  Self

## 2023-11-21 NOTE — TELEPHONE ENCOUNTER
I refer to Dr Higginbotham with [facility].  I would be happy to place the referral, however need dermatology record/reason for referral to do so

## 2023-11-21 NOTE — TELEPHONE ENCOUNTER
Her dermatologist recommended that she see rheumatology, something to do with her recent labs. She needs a referral or who do you recommend?     Please advise    Thank you

## 2024-01-15 DIAGNOSIS — I10 ESSENTIAL HYPERTENSION: ICD-10-CM

## 2024-01-15 RX ORDER — LISINOPRIL AND HYDROCHLOROTHIAZIDE 20; 12.5 MG/1; MG/1
TABLET ORAL
Qty: 90 TABLET | Refills: 1 | Status: SHIPPED | OUTPATIENT
Start: 2024-01-15

## 2024-01-15 NOTE — TELEPHONE ENCOUNTER
Last office visit 10/20/2023       Next office visit scheduled Visit date not found    Requested Prescriptions     Pending Prescriptions Disp Refills    lisinopril-hydroCHLOROthiazide (PRINZIDE;ZESTORETIC) 20-12.5 MG per tablet [Pharmacy Med Name: LISINOPRIL/HYDROCHLOROTHIAZIDE 20-12.5 MG Tablet] 90 tablet 3     Sig: TAKE 1 TABLET EVERY DAY

## 2024-02-02 SDOH — HEALTH STABILITY: PHYSICAL HEALTH: ON AVERAGE, HOW MANY MINUTES DO YOU ENGAGE IN EXERCISE AT THIS LEVEL?: 120 MIN

## 2024-02-02 SDOH — HEALTH STABILITY: PHYSICAL HEALTH: ON AVERAGE, HOW MANY DAYS PER WEEK DO YOU ENGAGE IN MODERATE TO STRENUOUS EXERCISE (LIKE A BRISK WALK)?: 2 DAYS

## 2024-02-02 ASSESSMENT — PATIENT HEALTH QUESTIONNAIRE - PHQ9
1. LITTLE INTEREST OR PLEASURE IN DOING THINGS: 0
2. FEELING DOWN, DEPRESSED OR HOPELESS: 0
SUM OF ALL RESPONSES TO PHQ QUESTIONS 1-9: 0
SUM OF ALL RESPONSES TO PHQ9 QUESTIONS 1 & 2: 0
SUM OF ALL RESPONSES TO PHQ QUESTIONS 1-9: 0

## 2024-02-02 ASSESSMENT — LIFESTYLE VARIABLES
HOW OFTEN DO YOU HAVE A DRINK CONTAINING ALCOHOL: NEVER
HOW MANY STANDARD DRINKS CONTAINING ALCOHOL DO YOU HAVE ON A TYPICAL DAY: PATIENT DOES NOT DRINK
HOW OFTEN DO YOU HAVE SIX OR MORE DRINKS ON ONE OCCASION: 1
HOW OFTEN DO YOU HAVE A DRINK CONTAINING ALCOHOL: 1
HOW MANY STANDARD DRINKS CONTAINING ALCOHOL DO YOU HAVE ON A TYPICAL DAY: 0

## 2024-02-05 ENCOUNTER — OFFICE VISIT (OUTPATIENT)
Dept: INTERNAL MEDICINE CLINIC | Age: 74
End: 2024-02-05
Payer: MEDICARE

## 2024-02-05 ENCOUNTER — HOSPITAL ENCOUNTER (OUTPATIENT)
Dept: GENERAL RADIOLOGY | Age: 74
Discharge: HOME | End: 2024-02-05
Payer: MEDICARE

## 2024-02-05 ENCOUNTER — HOSPITAL ENCOUNTER (OUTPATIENT)
Age: 74
Discharge: HOME | End: 2024-02-05
Payer: MEDICARE

## 2024-02-05 VITALS
BODY MASS INDEX: 29.89 KG/M2 | TEMPERATURE: 97.9 F | OXYGEN SATURATION: 97 % | HEART RATE: 83 BPM | DIASTOLIC BLOOD PRESSURE: 78 MMHG | SYSTOLIC BLOOD PRESSURE: 124 MMHG | WEIGHT: 186 LBS | HEIGHT: 66 IN

## 2024-02-05 DIAGNOSIS — E78.2 MIXED HYPERLIPIDEMIA: ICD-10-CM

## 2024-02-05 DIAGNOSIS — I70.0 ATHEROSCLEROSIS OF ABDOMINAL AORTA (HCC): ICD-10-CM

## 2024-02-05 DIAGNOSIS — M19.012 PRIMARY OSTEOARTHRITIS OF LEFT SHOULDER: ICD-10-CM

## 2024-02-05 DIAGNOSIS — Z00.00 MEDICARE ANNUAL WELLNESS VISIT, SUBSEQUENT: Primary | ICD-10-CM

## 2024-02-05 DIAGNOSIS — M79.602 LEFT ARM PAIN: ICD-10-CM

## 2024-02-05 DIAGNOSIS — I10 ESSENTIAL HYPERTENSION: ICD-10-CM

## 2024-02-05 PROCEDURE — 3017F COLORECTAL CA SCREEN DOC REV: CPT | Performed by: NURSE PRACTITIONER

## 2024-02-05 PROCEDURE — 3078F DIAST BP <80 MM HG: CPT | Performed by: NURSE PRACTITIONER

## 2024-02-05 PROCEDURE — G8417 CALC BMI ABV UP PARAM F/U: HCPCS | Performed by: NURSE PRACTITIONER

## 2024-02-05 PROCEDURE — G8484 FLU IMMUNIZE NO ADMIN: HCPCS | Performed by: NURSE PRACTITIONER

## 2024-02-05 PROCEDURE — 3074F SYST BP LT 130 MM HG: CPT | Performed by: NURSE PRACTITIONER

## 2024-02-05 PROCEDURE — 73060 X-RAY EXAM OF HUMERUS: CPT

## 2024-02-05 PROCEDURE — 1036F TOBACCO NON-USER: CPT | Performed by: NURSE PRACTITIONER

## 2024-02-05 PROCEDURE — 1090F PRES/ABSN URINE INCON ASSESS: CPT | Performed by: NURSE PRACTITIONER

## 2024-02-05 PROCEDURE — G0439 PPPS, SUBSEQ VISIT: HCPCS | Performed by: NURSE PRACTITIONER

## 2024-02-05 PROCEDURE — 99213 OFFICE O/P EST LOW 20 MIN: CPT | Performed by: NURSE PRACTITIONER

## 2024-02-05 PROCEDURE — 1124F ACP DISCUSS-NO DSCNMKR DOCD: CPT | Performed by: NURSE PRACTITIONER

## 2024-02-05 PROCEDURE — G8399 PT W/DXA RESULTS DOCUMENT: HCPCS | Performed by: NURSE PRACTITIONER

## 2024-02-05 PROCEDURE — G8427 DOCREV CUR MEDS BY ELIG CLIN: HCPCS | Performed by: NURSE PRACTITIONER

## 2024-02-05 ASSESSMENT — LIFESTYLE VARIABLES: HOW MANY STANDARD DRINKS CONTAINING ALCOHOL DO YOU HAVE ON A TYPICAL DAY: PATIENT DOES NOT DRINK

## 2024-02-05 NOTE — PROGRESS NOTES
distress  Cardiovascular: normal rate, regular rhythm, normal S1 and S2  Abdomen: soft, non-tender, non-distended, normal bowel sounds, no masses or organomegaly  Extremities: no cyanosis, clubbing or edema  Musculoskeletal: normal range of motion, left bicep tenderness      Allergies   Allergen Reactions    Sulfa Antibiotics Rash     As a child  Other reaction(s): Rash     Prior to Visit Medications    Medication Sig Taking? Authorizing Provider   lisinopril-hydroCHLOROthiazide (PRINZIDE;ZESTORETIC) 20-12.5 MG per tablet TAKE 1 TABLET EVERY DAY Yes Rissa Johnson APRN   APPLE CIDER VINEGAR PO Take by mouth Yes Jill Tony MD   rosuvastatin (CRESTOR) 20 MG tablet TAKE 1 TABLET DAILY (CANCEL PRAVASTATIN) Yes Eusebio Soria MD   Ascorbic Acid (VITAMIN C) 250 MG tablet Take 1 tablet by mouth daily Yes Jill Tony MD   vitamin B-12 (CYANOCOBALAMIN) 100 MCG tablet Take 0.5 tablets by mouth daily Yes Jill Tony MD   Cholecalciferol (VITAMIN D3) 1.25 MG (42358 UT) CAPS Take by mouth Yes Jill Tony MD       CareTeam (Including outside providers/suppliers regularly involved in providing care):   Patient Care Team:  Rissa Johnson APRN as PCP - General (Nurse Practitioner)  Rissa Johnson APRN as PCP - Empaneled Provider     Reviewed and updated this visit:  Tobacco  Allergies  Meds  Med Hx  Surg Hx  Soc Hx  Fam Hx

## 2024-02-05 NOTE — PATIENT INSTRUCTIONS
chew 1 adult-strength or 2 to 4 low-dose aspirin. Wait for an ambulance. Do not try to drive yourself.  Watch closely for changes in your health, and be sure to contact your doctor if you have any problems.  Where can you learn more?  Go to https://www.Urve[employer].net/patientEd and enter F075 to learn more about \"A Healthy Heart: Care Instructions.\"  Current as of: June 25, 2023               Content Version: 13.9  © 1727-6679 [employer].   Care instructions adapted under license by [employer]. If you have questions about a medical condition or this instruction, always ask your healthcare professional. [employer] disclaims any warranty or liability for your use of this information.      Personalized Preventive Plan for Alejandrina Fernandez - 2/5/2024  Medicare offers a range of preventive health benefits. Some of the tests and screenings are paid in full while other may be subject to a deductible, co-insurance, and/or copay.    Some of these benefits include a comprehensive review of your medical history including lifestyle, illnesses that may run in your family, and various assessments and screenings as appropriate.    After reviewing your medical record and screening and assessments performed today your provider may have ordered immunizations, labs, imaging, and/or referrals for you.  A list of these orders (if applicable) as well as your Preventive Care list are included within your After Visit Summary for your review.    Other Preventive Recommendations:    A preventive eye exam performed by an eye specialist is recommended every 1-2 years to screen for glaucoma; cataracts, macular degeneration, and other eye disorders.  A preventive dental visit is recommended every 6 months.  Try to get at least 150 minutes of exercise per week or 10,000 steps per day on a pedometer .  Order or download the FREE \"Exercise & Physical Activity: Your Everyday Guide\" from [employer].

## 2024-02-06 DIAGNOSIS — Z78.0 POST-MENOPAUSAL: Primary | ICD-10-CM

## 2024-02-07 DIAGNOSIS — E78.2 MIXED HYPERLIPIDEMIA: ICD-10-CM

## 2024-02-07 RX ORDER — ROSUVASTATIN CALCIUM 20 MG/1
TABLET, COATED ORAL
Qty: 90 TABLET | Refills: 1 | Status: SHIPPED | OUTPATIENT
Start: 2024-02-07

## 2024-04-30 ENCOUNTER — OFFICE VISIT (OUTPATIENT)
Dept: INTERNAL MEDICINE CLINIC | Age: 74
End: 2024-04-30

## 2024-04-30 VITALS
TEMPERATURE: 98 F | BODY MASS INDEX: 30.36 KG/M2 | SYSTOLIC BLOOD PRESSURE: 126 MMHG | WEIGHT: 188.13 LBS | OXYGEN SATURATION: 98 % | HEART RATE: 96 BPM | DIASTOLIC BLOOD PRESSURE: 62 MMHG

## 2024-04-30 DIAGNOSIS — M54.41 ACUTE BILATERAL LOW BACK PAIN WITH BILATERAL SCIATICA: Primary | ICD-10-CM

## 2024-04-30 DIAGNOSIS — M54.42 ACUTE BILATERAL LOW BACK PAIN WITH BILATERAL SCIATICA: Primary | ICD-10-CM

## 2024-04-30 RX ORDER — GABAPENTIN 100 MG/1
100-300 CAPSULE ORAL NIGHTLY
COMMUNITY
Start: 2024-04-19 | End: 2024-06-18

## 2024-04-30 ASSESSMENT — ENCOUNTER SYMPTOMS
BACK PAIN: 1
CONSTIPATION: 0

## 2024-04-30 NOTE — PROGRESS NOTES
Date: 4/30/2024                                               Subjective/Objective:     Chief Complaint   Patient presents with    Back Pain     Has had since 4.21.2024       HPI    Alejandrina Fernandez is a 72 yo female, visit today for c/o low back pain. She is following with PMR at [facility] for her neck pain. She was seen 4/19/2024 and c/o low back pain. She had an xray done and they recommended MRI and DEXA scan. MRI is scheduled for next week. The pain in her low back has been worsening. It is in middle of the back, bilateral buttock and goes down to bilateral legs to the knee area. She isn't sure if she has had any numbness or tingling in the legs. She has had ANCELMO in her back for similar symptoms. Was seen at [facility] previously for this. She was given oral steroids recently however it \"made me crazy.\" She was started on gabapentin by PMR, this has not been helpful at all. Denies changes to bowel/bladder habits. The back pain is worse when laying in bed, and going from sitting to standing. Denies fall/injury.     L Spine xray 4/19/2024  IMPRESSION:   Mild lumbar levocurvature and mild adynamic grade 1 anterolisthesis L4 on L5.   Mild-moderate multilevel lumbar spine degenerative disc disease and severe lower lumbar facet arthropathy.          Patient Active Problem List    Diagnosis Date Noted    Arthritis of right knee 08/17/2022    Primary osteoarthritis of right knee 08/08/2022    RUQ pain 06/16/2022    Primary osteoarthritis of left shoulder 09/09/2023    Prediabetes 03/08/2022    Atherosclerosis of abdominal aorta (HCC) 03/08/2022    History of colon cancer, stage I 02/15/2022    Vitamin D deficiency 02/15/2022    Family history of type 2 diabetes mellitus 02/15/2022    Carotid artery calcification, bilateral 02/15/2022    Lumbar stenosis 02/02/2022    Class 1 obesity due to excess calories without serious comorbidity with body mass index (BMI) of 33.0 to 33.9 in adult 08/25/2020    Ganglion 02/03/2016

## 2024-07-01 ENCOUNTER — TELEPHONE (OUTPATIENT)
Dept: ORTHOPEDIC SURGERY | Age: 74
End: 2024-07-01

## 2024-07-01 DIAGNOSIS — I10 ESSENTIAL HYPERTENSION: ICD-10-CM

## 2024-07-01 RX ORDER — LISINOPRIL AND HYDROCHLOROTHIAZIDE 20; 12.5 MG/1; MG/1
TABLET ORAL
Qty: 90 TABLET | Refills: 1 | Status: SHIPPED | OUTPATIENT
Start: 2024-07-01

## 2024-07-01 NOTE — TELEPHONE ENCOUNTER
Appointment Request     Patient requesting earlier appointment: No  Appointment offered to patient: YES   Patient Contact Number: [phone]     Pt HAD A FALL, BUT LANDED ON HER OTHER KNEE. TODAY, AT [facility], Pt'S SX KNEE WAS HURTING, SO SHE ISN'T SURE IF SHE TWISTED THE SX KNEE WHEN FALLING OR WHAT HAS HAPPENED TO CAUSE THE NEW PAIN.     Pt WOULD LIKE TO SEE DR WALTER SOONEST POSSIBLE APPT. SHE SHOULD BE AVAILABLE ON 07/08/24 IF SHE CAN BE ADDED TO THE SCHEDULE.      PLEASE CALL TO [facility]VISE.

## 2024-07-01 NOTE — TELEPHONE ENCOUNTER
Medication:   Requested Prescriptions     Pending Prescriptions Disp Refills    lisinopril-hydroCHLOROthiazide (PRINZIDE;ZESTORETIC) 20-12.5 MG per tablet [Pharmacy Med Name: LISINOPRIL/HYDROCHLOROTHIAZIDE 20-12.5 MG Tablet] 90 tablet 3     Sig: TAKE 1 TABLET EVERY DAY       Last Filled:      Patient Phone Number: [phone] (home)     Last appt: 4/30/2024   Next appt: Visit date not found  No future appointments.

## 2024-07-02 ENCOUNTER — OFFICE VISIT (OUTPATIENT)
Dept: ORTHOPEDIC SURGERY | Age: 74
End: 2024-07-02
Payer: MEDICARE

## 2024-07-02 VITALS — HEIGHT: 66 IN | RESPIRATION RATE: 16 BRPM | BODY MASS INDEX: 30.22 KG/M2 | WEIGHT: 188 LBS

## 2024-07-02 DIAGNOSIS — Z96.651 STATUS POST TOTAL RIGHT KNEE REPLACEMENT: Primary | ICD-10-CM

## 2024-07-02 PROCEDURE — 1090F PRES/ABSN URINE INCON ASSESS: CPT | Performed by: PHYSICIAN ASSISTANT

## 2024-07-02 PROCEDURE — 3017F COLORECTAL CA SCREEN DOC REV: CPT | Performed by: PHYSICIAN ASSISTANT

## 2024-07-02 PROCEDURE — 1036F TOBACCO NON-USER: CPT | Performed by: PHYSICIAN ASSISTANT

## 2024-07-02 PROCEDURE — 99213 OFFICE O/P EST LOW 20 MIN: CPT | Performed by: PHYSICIAN ASSISTANT

## 2024-07-02 PROCEDURE — 1124F ACP DISCUSS-NO DSCNMKR DOCD: CPT | Performed by: PHYSICIAN ASSISTANT

## 2024-07-02 PROCEDURE — G8399 PT W/DXA RESULTS DOCUMENT: HCPCS | Performed by: PHYSICIAN ASSISTANT

## 2024-07-02 PROCEDURE — G8417 CALC BMI ABV UP PARAM F/U: HCPCS | Performed by: PHYSICIAN ASSISTANT

## 2024-07-02 PROCEDURE — G8427 DOCREV CUR MEDS BY ELIG CLIN: HCPCS | Performed by: PHYSICIAN ASSISTANT

## 2024-07-02 NOTE — PROGRESS NOTES
record.  This time excludes any time spent performing procedures or tests in the office.                                                Josh Varghese PA-C   Senior Physician Assistant   [facility]                                         Disclaimer:  This note was generated with use of a verbal recognition program (DRAGON) and an attempt was made to check for errors.  It is possible that there are still dictated errors within this office note.  If so, please bring any significant errors to my attention for an addendum.  All efforts were made to ensure that this office note is accurate.

## 2024-07-03 DIAGNOSIS — E78.2 MIXED HYPERLIPIDEMIA: ICD-10-CM

## 2024-07-03 RX ORDER — ROSUVASTATIN CALCIUM 20 MG/1
TABLET, COATED ORAL
Qty: 90 TABLET | Refills: 1 | Status: SHIPPED | OUTPATIENT
Start: 2024-07-03

## 2024-07-19 ENCOUNTER — TRANSCRIBE ORDERS (OUTPATIENT)
Dept: GENERAL RADIOLOGY | Age: 74
End: 2024-07-19

## 2024-07-19 ENCOUNTER — HOSPITAL ENCOUNTER (OUTPATIENT)
Age: 74
Discharge: HOME | End: 2024-07-19
Payer: MEDICARE

## 2024-07-19 ENCOUNTER — HOSPITAL ENCOUNTER (OUTPATIENT)
Dept: GENERAL RADIOLOGY | Age: 74
Discharge: HOME | End: 2024-07-19
Payer: MEDICARE

## 2024-07-19 PROCEDURE — 73502 X-RAY EXAM HIP UNI 2-3 VIEWS: CPT

## 2024-07-26 SDOH — HEALTH STABILITY: PHYSICAL HEALTH: ON AVERAGE, HOW MANY DAYS PER WEEK DO YOU ENGAGE IN MODERATE TO STRENUOUS EXERCISE (LIKE A BRISK WALK)?: 3 DAYS

## 2024-07-29 ENCOUNTER — OFFICE VISIT (OUTPATIENT)
Dept: ORTHOPEDIC SURGERY | Age: 74
End: 2024-07-29
Payer: MEDICARE

## 2024-07-29 VITALS — WEIGHT: 183 LBS | BODY MASS INDEX: 29.41 KG/M2 | HEIGHT: 66 IN

## 2024-07-29 DIAGNOSIS — M25.512 CHRONIC LEFT SHOULDER PAIN: ICD-10-CM

## 2024-07-29 DIAGNOSIS — G89.29 CHRONIC LEFT SHOULDER PAIN: ICD-10-CM

## 2024-07-29 DIAGNOSIS — M19.012 GLENOHUMERAL ARTHRITIS, LEFT: Primary | ICD-10-CM

## 2024-07-29 PROCEDURE — G8417 CALC BMI ABV UP PARAM F/U: HCPCS | Performed by: ORTHOPAEDIC SURGERY

## 2024-07-29 PROCEDURE — G8427 DOCREV CUR MEDS BY ELIG CLIN: HCPCS | Performed by: ORTHOPAEDIC SURGERY

## 2024-07-29 PROCEDURE — G8399 PT W/DXA RESULTS DOCUMENT: HCPCS | Performed by: ORTHOPAEDIC SURGERY

## 2024-07-29 PROCEDURE — 1090F PRES/ABSN URINE INCON ASSESS: CPT | Performed by: ORTHOPAEDIC SURGERY

## 2024-07-29 PROCEDURE — 99213 OFFICE O/P EST LOW 20 MIN: CPT | Performed by: ORTHOPAEDIC SURGERY

## 2024-07-29 PROCEDURE — 1036F TOBACCO NON-USER: CPT | Performed by: ORTHOPAEDIC SURGERY

## 2024-07-29 PROCEDURE — 1124F ACP DISCUSS-NO DSCNMKR DOCD: CPT | Performed by: ORTHOPAEDIC SURGERY

## 2024-07-29 PROCEDURE — 20610 DRAIN/INJ JOINT/BURSA W/O US: CPT | Performed by: ORTHOPAEDIC SURGERY

## 2024-07-29 PROCEDURE — 3017F COLORECTAL CA SCREEN DOC REV: CPT | Performed by: ORTHOPAEDIC SURGERY

## 2024-07-29 RX ORDER — BUPIVACAINE HYDROCHLORIDE 2.5 MG/ML
4 INJECTION, SOLUTION INFILTRATION; PERINEURAL ONCE
Status: COMPLETED | OUTPATIENT
Start: 2024-07-29 | End: 2024-07-29

## 2024-07-29 RX ORDER — LIDOCAINE HYDROCHLORIDE 10 MG/ML
4 INJECTION, SOLUTION INFILTRATION; PERINEURAL ONCE
Status: COMPLETED | OUTPATIENT
Start: 2024-07-29 | End: 2024-07-29

## 2024-07-29 RX ORDER — TRIAMCINOLONE ACETONIDE 40 MG/ML
40 INJECTION, SUSPENSION INTRA-ARTICULAR; INTRAMUSCULAR ONCE
Status: COMPLETED | OUTPATIENT
Start: 2024-07-29 | End: 2024-07-29

## 2024-07-29 RX ADMIN — LIDOCAINE HYDROCHLORIDE 4 ML: 10 INJECTION, SOLUTION INFILTRATION; PERINEURAL at 08:38

## 2024-07-29 RX ADMIN — TRIAMCINOLONE ACETONIDE 40 MG: 40 INJECTION, SUSPENSION INTRA-ARTICULAR; INTRAMUSCULAR at 08:38

## 2024-07-29 RX ADMIN — BUPIVACAINE HYDROCHLORIDE 10 MG: 2.5 INJECTION, SOLUTION INFILTRATION; PERINEURAL at 08:37

## 2024-07-29 NOTE — PROGRESS NOTES
CHIEF COMPLAINT: Right shoulder pain    History:    Alejandrina Fernandez is a 73 y.o. right handed female self-referred for evaluation and treatment of Right shoulder pain.   This is evaluated as a personal injury.   The pain began 1 year ago.  Pain is rated as a 8/10.   She points to pain being located along her mid humerus, lateral shoulder.  She does note some radicular pain down her left arm.  She did have cervical spine evaluation at [facility] and did have an epidural steroid injection in June which resolved her radicular pain.  Symptoms are worse with any movement, overhead activity, external rotation, driving.  The patient has not had PT for her shoulder.   She had left shoulder injection by Dr. Echevarria in December 2023, which helped.  The patient has tried NSAIDs, ibuprofen with relief. The patient has not tried ice.   Patient's occupation is at a [employer]      Past Medical History:   Diagnosis Date    Arthritis     Colon cancer (HCC) 2003    Colon polyp 10/22/2012    Essential hypertension 03/27/2015    Full dentures     Hyperlipidemia 09/16/2011    Mixed hyperlipidemia 09/16/2011    Spinal stenosis 09/2022       Past Surgical History:   Procedure Laterality Date    APPENDECTOMY  2003    \"came out with colon surgery\"    COLECTOMY  2003    COLONOSCOPY      2017    JOINT REPLACEMENT Right 09/2022    KNEE SURGERY Right 09/30/2022    RIGHT KNEE MANIPULATION performed by Paul Holt MD at [facility] OR    TOTAL KNEE ARTHROPLASTY Right 08/17/2022    RIGHT TOTAL KNEE REPLACEMENT ROBOTIC ASSISTED performed by Paul Holt MD at [facility] OR       Current Outpatient Medications on File Prior to Visit   Medication Sig Dispense Refill    rosuvastatin (CRESTOR) 20 MG tablet TAKE 1 TABLET DAILY (CANCEL PRAVASTATIN) 90 tablet 1    lisinopril-hydroCHLOROthiazide (PRINZIDE;ZESTORETIC) 20-12.5 MG per tablet TAKE 1 TABLET EVERY DAY 90 tablet 1    APPLE CIDER VINEGAR PO Take by mouth      Ascorbic Acid (VITAMIN C) 250 MG tablet Take

## 2024-10-02 ENCOUNTER — TELEPHONE (OUTPATIENT)
Dept: INTERNAL MEDICINE CLINIC | Age: 74
End: 2024-10-02

## 2024-10-02 NOTE — TELEPHONE ENCOUNTER
Patient stated that she was told she has a cyst on her right kidney from scan done with [facility] and wanted to know next steps for that. Also was wanting someone to go over Dexa scan results that were done with [facility].     Attempted to get her an appt but nothing is open till Oct 25th.

## 2024-10-02 NOTE — TELEPHONE ENCOUNTER
Called pt and told her we did not order the MRI she would need to reach out to [facility] regarding that test.      Also recommended pt schedule a yearly physical with us.     She is wondering if you can give her her dexa scan results?

## 2024-10-03 NOTE — TELEPHONE ENCOUNTER
Not sure who ordered the DEXA either - important she follow up with providers who order the imaging.   I did review DEXA, shows osteopenia, precursor to osteoporosis. Treatment is adequate calcium and vitamin D, exercise (weight-bearing), fall prevention. It is recommended that you get 1200 mg of elemental calcium daily, total diet plus supplement if needed, and 800 international units of vitamin D daily. As it is difficult to get enough vitamin D through diet, it is generally recommended to get a vitamin D supplement.   I do not seen the scan showing kidney cyst. Please ask her to have records sent and schedule an OV to discuss

## 2024-11-24 DIAGNOSIS — I10 ESSENTIAL HYPERTENSION: ICD-10-CM

## 2024-11-24 DIAGNOSIS — E78.2 MIXED HYPERLIPIDEMIA: ICD-10-CM

## 2024-11-25 RX ORDER — LISINOPRIL AND HYDROCHLOROTHIAZIDE 12.5; 2 MG/1; MG/1
TABLET ORAL
Qty: 90 TABLET | Refills: 1 | Status: SHIPPED | OUTPATIENT
Start: 2024-11-25

## 2024-11-25 RX ORDER — ROSUVASTATIN CALCIUM 20 MG/1
TABLET, COATED ORAL
Qty: 90 TABLET | Refills: 1 | Status: SHIPPED | OUTPATIENT
Start: 2024-11-25

## 2024-11-25 NOTE — TELEPHONE ENCOUNTER
Future Appointments   Date Time Provider Department Center   11/27/2024  3:00 PM Tara Farris, [place] - CNP [facility] E[facility] DEP       Last appt 4/30/24

## 2024-11-27 ENCOUNTER — OFFICE VISIT (OUTPATIENT)
Dept: INTERNAL MEDICINE CLINIC | Age: 74
End: 2024-11-27
Payer: MEDICARE

## 2024-11-27 VITALS
HEART RATE: 76 BPM | OXYGEN SATURATION: 93 % | HEIGHT: 66 IN | SYSTOLIC BLOOD PRESSURE: 116 MMHG | BODY MASS INDEX: 29.41 KG/M2 | DIASTOLIC BLOOD PRESSURE: 72 MMHG | WEIGHT: 183 LBS

## 2024-11-27 DIAGNOSIS — Z01.818 PRE-OPERATIVE CLEARANCE: Primary | ICD-10-CM

## 2024-11-27 LAB
BACTERIA URNS QL MICRO: NORMAL /HPF
BILIRUB UR QL STRIP.AUTO: NEGATIVE
CLARITY UR: CLEAR
COLOR UR: YELLOW
EPI CELLS #/AREA URNS AUTO: 2 /HPF (ref 0–5)
GLUCOSE UR STRIP.AUTO-MCNC: NEGATIVE MG/DL
HGB UR QL STRIP.AUTO: NEGATIVE
HYALINE CASTS #/AREA URNS AUTO: 0 /LPF (ref 0–8)
KETONES UR STRIP.AUTO-MCNC: NEGATIVE MG/DL
LEUKOCYTE ESTERASE UR QL STRIP.AUTO: ABNORMAL
NITRITE UR QL STRIP.AUTO: NEGATIVE
PH UR STRIP.AUTO: 6 [PH] (ref 5–8)
PROT UR STRIP.AUTO-MCNC: NEGATIVE MG/DL
RBC CLUMPS #/AREA URNS AUTO: 0 /HPF (ref 0–4)
SP GR UR STRIP.AUTO: 1.01 (ref 1–1.03)
UA COMPLETE W REFLEX CULTURE PNL UR: ABNORMAL
UA DIPSTICK W REFLEX MICRO PNL UR: YES
URN SPEC COLLECT METH UR: ABNORMAL
UROBILINOGEN UR STRIP-ACNC: 0.2 E.U./DL
WBC #/AREA URNS AUTO: 3 /HPF (ref 0–5)

## 2024-11-27 PROCEDURE — G8399 PT W/DXA RESULTS DOCUMENT: HCPCS | Performed by: STUDENT IN AN ORGANIZED HEALTH CARE EDUCATION/TRAINING PROGRAM

## 2024-11-27 PROCEDURE — 3017F COLORECTAL CA SCREEN DOC REV: CPT | Performed by: STUDENT IN AN ORGANIZED HEALTH CARE EDUCATION/TRAINING PROGRAM

## 2024-11-27 PROCEDURE — 1159F MED LIST DOCD IN RCRD: CPT | Performed by: STUDENT IN AN ORGANIZED HEALTH CARE EDUCATION/TRAINING PROGRAM

## 2024-11-27 PROCEDURE — 99214 OFFICE O/P EST MOD 30 MIN: CPT | Performed by: STUDENT IN AN ORGANIZED HEALTH CARE EDUCATION/TRAINING PROGRAM

## 2024-11-27 PROCEDURE — 1124F ACP DISCUSS-NO DSCNMKR DOCD: CPT | Performed by: STUDENT IN AN ORGANIZED HEALTH CARE EDUCATION/TRAINING PROGRAM

## 2024-11-27 PROCEDURE — 3074F SYST BP LT 130 MM HG: CPT | Performed by: STUDENT IN AN ORGANIZED HEALTH CARE EDUCATION/TRAINING PROGRAM

## 2024-11-27 PROCEDURE — 3078F DIAST BP <80 MM HG: CPT | Performed by: STUDENT IN AN ORGANIZED HEALTH CARE EDUCATION/TRAINING PROGRAM

## 2024-11-27 PROCEDURE — G8427 DOCREV CUR MEDS BY ELIG CLIN: HCPCS | Performed by: STUDENT IN AN ORGANIZED HEALTH CARE EDUCATION/TRAINING PROGRAM

## 2024-11-27 PROCEDURE — G8417 CALC BMI ABV UP PARAM F/U: HCPCS | Performed by: STUDENT IN AN ORGANIZED HEALTH CARE EDUCATION/TRAINING PROGRAM

## 2024-11-27 PROCEDURE — 1036F TOBACCO NON-USER: CPT | Performed by: STUDENT IN AN ORGANIZED HEALTH CARE EDUCATION/TRAINING PROGRAM

## 2024-11-27 PROCEDURE — G8484 FLU IMMUNIZE NO ADMIN: HCPCS | Performed by: STUDENT IN AN ORGANIZED HEALTH CARE EDUCATION/TRAINING PROGRAM

## 2024-11-27 PROCEDURE — 1090F PRES/ABSN URINE INCON ASSESS: CPT | Performed by: STUDENT IN AN ORGANIZED HEALTH CARE EDUCATION/TRAINING PROGRAM

## 2024-11-27 SDOH — ECONOMIC STABILITY: FOOD INSECURITY: WITHIN THE PAST 12 MONTHS, THE FOOD YOU BOUGHT JUST DIDN'T LAST AND YOU DIDN'T HAVE MONEY TO GET MORE.: NEVER TRUE

## 2024-11-27 SDOH — ECONOMIC STABILITY: FOOD INSECURITY: WITHIN THE PAST 12 MONTHS, YOU WORRIED THAT YOUR FOOD WOULD RUN OUT BEFORE YOU GOT MONEY TO BUY MORE.: NEVER TRUE

## 2024-11-27 SDOH — ECONOMIC STABILITY: INCOME INSECURITY: HOW HARD IS IT FOR YOU TO PAY FOR THE VERY BASICS LIKE FOOD, HOUSING, MEDICAL CARE, AND HEATING?: NOT HARD AT ALL

## 2024-11-27 NOTE — PROGRESS NOTES
Alejandrina Fernandez (:  1950) is a 74 y.o. female,Established patient, here for evaluation of the following chief complaint(s):  Pre-op Exam ([facility] Dr Aranda 24 Left Total Shoulder Arthroplasty.)    Patient is a 74-year-old female who presents to the office for preoperative exam for Left Total Shoulder Arthroplasty with Dr. Aranda on .     She has past medical history of hypertension, atherosclerotic disease of the aorta, osteoarthritis, hyperlipidemia, and prediabetes.    Patient denies any complaints today she feels generally well.    Denies any personal or family history of complications with anesthesia.  Denies any history of clotting disorders or blood clots.       Assessment & Plan  Pre-operative clearance   - Lab work and ECG ordered.     Orders:    EKG 12 Lead; Future    CBC with Auto Differential; Future    Hemoglobin A1C; Future    TSH; Future    Culture, MRSA, Screening    Protime-INR    Urinalysis with Reflex to Culture    Basic Metabolic Panel; Future    Potassium      Return in about 3 months (around 2025).       Subjective       Review of Systems   Constitutional:  Negative for appetite change, fatigue, fever and unexpected weight change.   HENT:  Negative for sore throat, trouble swallowing and voice change.    Eyes:  Negative for photophobia and visual disturbance.   Respiratory:  Negative for cough and shortness of breath.    Cardiovascular: Negative.  Negative for chest pain, palpitations and leg swelling.   Gastrointestinal:  Negative for abdominal pain, constipation, diarrhea, nausea and vomiting.   Endocrine: Negative for polyphagia and polyuria.   Genitourinary:  Negative for decreased urine volume, difficulty urinating and dysuria.   Musculoskeletal:  Negative for arthralgias, joint swelling and myalgias.   Skin:  Negative for color change and rash.   Neurological:  Negative for dizziness, weakness, numbness and headaches.   Psychiatric/Behavioral:  Negative for

## 2024-11-27 NOTE — PATIENT INSTRUCTIONS
Present [facility]     - Present to the lab for blood work. Please fast 8-10 hours prior to your appointment.   - Complete ECG at [facility]. Will result to My Chart.   - Urine was collected at your visit and will be resulted on [facility].

## 2024-11-29 ENCOUNTER — HOSPITAL ENCOUNTER (OUTPATIENT)
Age: 74
Discharge: HOME | End: 2024-11-29
Payer: MEDICARE

## 2024-11-29 DIAGNOSIS — Z01.818 PRE-OPERATIVE CLEARANCE: ICD-10-CM

## 2024-11-29 LAB
ANION GAP SERPL CALCULATED.3IONS-SCNC: 8 MMOL/L (ref 3–16)
BASOPHILS # BLD: 0 K/UL (ref 0–0.2)
BASOPHILS NFR BLD: 0.9 %
BUN SERPL-MCNC: 12 MG/DL (ref 7–20)
CALCIUM SERPL-MCNC: 9.6 MG/DL (ref 8.3–10.6)
CHLORIDE SERPL-SCNC: 105 MMOL/L (ref 99–110)
CO2 SERPL-SCNC: 27 MMOL/L (ref 21–32)
CREAT SERPL-MCNC: 0.9 MG/DL (ref 0.6–1.2)
DEPRECATED RDW RBC AUTO: 14.5 % (ref 12.4–15.4)
EOSINOPHIL # BLD: 0.1 K/UL (ref 0–0.6)
EOSINOPHIL NFR BLD: 1.9 %
EST. AVERAGE GLUCOSE BLD GHB EST-MCNC: 116.9 MG/DL
GFR SERPLBLD CREATININE-BSD FMLA CKD-EPI: 67 ML/MIN/{1.73_M2}
GLUCOSE SERPL-MCNC: 84 MG/DL (ref 70–99)
HBA1C MFR BLD: 5.7 %
HCT VFR BLD AUTO: 38.8 % (ref 36–48)
HGB BLD-MCNC: 12.8 G/DL (ref 12–16)
INR PPP: 0.96 (ref 0.85–1.15)
LYMPHOCYTES # BLD: 1 K/UL (ref 1–5.1)
LYMPHOCYTES NFR BLD: 25.2 %
MCH RBC QN AUTO: 31.7 PG (ref 26–34)
MCHC RBC AUTO-ENTMCNC: 33.1 G/DL (ref 31–36)
MCV RBC AUTO: 95.8 FL (ref 80–100)
MONOCYTES # BLD: 0.3 K/UL (ref 0–1.3)
MONOCYTES NFR BLD: 6.6 %
NEUTROPHILS # BLD: 2.6 K/UL (ref 1.7–7.7)
NEUTROPHILS NFR BLD: 65.4 %
PLATELET # BLD AUTO: 299 K/UL (ref 135–450)
PMV BLD AUTO: 6.9 FL (ref 5–10.5)
POTASSIUM SERPL-SCNC: 3.8 MMOL/L (ref 3.5–5.1)
PROTHROMBIN TIME: 13 SEC (ref 11.9–14.9)
RBC # BLD AUTO: 4.05 M/UL (ref 4–5.2)
SODIUM SERPL-SCNC: 140 MMOL/L (ref 136–145)
TSH SERPL DL<=0.005 MIU/L-ACNC: 0.8 UIU/ML (ref 0.27–4.2)
WBC # BLD AUTO: 3.9 K/UL (ref 4–11)

## 2024-11-29 PROCEDURE — 85025 COMPLETE CBC W/AUTO DIFF WBC: CPT

## 2024-11-29 PROCEDURE — 83036 HEMOGLOBIN GLYCOSYLATED A1C: CPT

## 2024-11-29 PROCEDURE — 80048 BASIC METABOLIC PNL TOTAL CA: CPT

## 2024-11-29 PROCEDURE — 85610 PROTHROMBIN TIME: CPT

## 2024-11-29 PROCEDURE — 87641 MR-STAPH DNA AMP PROBE: CPT

## 2024-11-29 PROCEDURE — 93005 ELECTROCARDIOGRAM TRACING: CPT

## 2024-11-29 PROCEDURE — 84443 ASSAY THYROID STIM HORMONE: CPT

## 2024-11-29 PROCEDURE — 36415 COLL VENOUS BLD VENIPUNCTURE: CPT

## 2024-11-30 LAB
EKG ATRIAL RATE: 70 BPM
EKG DIAGNOSIS: NORMAL
EKG P AXIS: 47 DEGREES
EKG P-R INTERVAL: 172 MS
EKG Q-T INTERVAL: 410 MS
EKG QRS DURATION: 70 MS
EKG QTC CALCULATION (BAZETT): 442 MS
EKG R AXIS: -8 DEGREES
EKG T AXIS: 13 DEGREES
EKG VENTRICULAR RATE: 70 BPM
MRSA DNA SPEC QL NAA+PROBE: NORMAL

## 2025-01-17 SDOH — ECONOMIC STABILITY: FOOD INSECURITY: WITHIN THE PAST 12 MONTHS, THE FOOD YOU BOUGHT JUST DIDN'T LAST AND YOU DIDN'T HAVE MONEY TO GET MORE.: NEVER TRUE

## 2025-01-17 SDOH — HEALTH STABILITY: PHYSICAL HEALTH: ON AVERAGE, HOW MANY MINUTES DO YOU ENGAGE IN EXERCISE AT THIS LEVEL?: 20 MIN

## 2025-01-17 SDOH — HEALTH STABILITY: PHYSICAL HEALTH: ON AVERAGE, HOW MANY DAYS PER WEEK DO YOU ENGAGE IN MODERATE TO STRENUOUS EXERCISE (LIKE A BRISK WALK)?: 1 DAY

## 2025-01-17 SDOH — ECONOMIC STABILITY: FOOD INSECURITY: WITHIN THE PAST 12 MONTHS, YOU WORRIED THAT YOUR FOOD WOULD RUN OUT BEFORE YOU GOT MONEY TO BUY MORE.: NEVER TRUE

## 2025-01-17 SDOH — ECONOMIC STABILITY: INCOME INSECURITY: IN THE LAST 12 MONTHS, WAS THERE A TIME WHEN YOU WERE NOT ABLE TO PAY THE MORTGAGE OR RENT ON TIME?: NO

## 2025-01-17 SDOH — ECONOMIC STABILITY: TRANSPORTATION INSECURITY
IN THE PAST 12 MONTHS, HAS THE LACK OF TRANSPORTATION KEPT YOU FROM MEDICAL APPOINTMENTS OR FROM GETTING MEDICATIONS?: YES

## 2025-01-17 ASSESSMENT — PATIENT HEALTH QUESTIONNAIRE - PHQ9
2. FEELING DOWN, DEPRESSED OR HOPELESS: NOT AT ALL
1. LITTLE INTEREST OR PLEASURE IN DOING THINGS: NOT AT ALL
SUM OF ALL RESPONSES TO PHQ QUESTIONS 1-9: 0
SUM OF ALL RESPONSES TO PHQ9 QUESTIONS 1 & 2: 0
SUM OF ALL RESPONSES TO PHQ QUESTIONS 1-9: 0

## 2025-01-17 ASSESSMENT — LIFESTYLE VARIABLES
HOW OFTEN DO YOU HAVE A DRINK CONTAINING ALCOHOL: NEVER
HOW OFTEN DO YOU HAVE A DRINK CONTAINING ALCOHOL: 1
HOW MANY STANDARD DRINKS CONTAINING ALCOHOL DO YOU HAVE ON A TYPICAL DAY: PATIENT DOES NOT DRINK
HOW MANY STANDARD DRINKS CONTAINING ALCOHOL DO YOU HAVE ON A TYPICAL DAY: 0
HOW OFTEN DO YOU HAVE SIX OR MORE DRINKS ON ONE OCCASION: 1

## 2025-01-20 ENCOUNTER — OFFICE VISIT (OUTPATIENT)
Dept: INTERNAL MEDICINE CLINIC | Age: 75
End: 2025-01-20
Payer: MEDICARE

## 2025-01-20 VITALS
HEART RATE: 80 BPM | HEIGHT: 66 IN | OXYGEN SATURATION: 95 % | SYSTOLIC BLOOD PRESSURE: 108 MMHG | BODY MASS INDEX: 29.06 KG/M2 | DIASTOLIC BLOOD PRESSURE: 74 MMHG | WEIGHT: 180.8 LBS

## 2025-01-20 DIAGNOSIS — M19.012 PRIMARY OSTEOARTHRITIS OF LEFT SHOULDER: ICD-10-CM

## 2025-01-20 DIAGNOSIS — R73.03 PREDIABETES: Primary | ICD-10-CM

## 2025-01-20 DIAGNOSIS — E78.2 MIXED HYPERLIPIDEMIA: ICD-10-CM

## 2025-01-20 DIAGNOSIS — I10 ESSENTIAL HYPERTENSION: ICD-10-CM

## 2025-01-20 PROCEDURE — G8417 CALC BMI ABV UP PARAM F/U: HCPCS | Performed by: STUDENT IN AN ORGANIZED HEALTH CARE EDUCATION/TRAINING PROGRAM

## 2025-01-20 PROCEDURE — G8399 PT W/DXA RESULTS DOCUMENT: HCPCS | Performed by: STUDENT IN AN ORGANIZED HEALTH CARE EDUCATION/TRAINING PROGRAM

## 2025-01-20 PROCEDURE — 1124F ACP DISCUSS-NO DSCNMKR DOCD: CPT | Performed by: STUDENT IN AN ORGANIZED HEALTH CARE EDUCATION/TRAINING PROGRAM

## 2025-01-20 PROCEDURE — 1090F PRES/ABSN URINE INCON ASSESS: CPT | Performed by: STUDENT IN AN ORGANIZED HEALTH CARE EDUCATION/TRAINING PROGRAM

## 2025-01-20 PROCEDURE — G8427 DOCREV CUR MEDS BY ELIG CLIN: HCPCS | Performed by: STUDENT IN AN ORGANIZED HEALTH CARE EDUCATION/TRAINING PROGRAM

## 2025-01-20 PROCEDURE — 1159F MED LIST DOCD IN RCRD: CPT | Performed by: STUDENT IN AN ORGANIZED HEALTH CARE EDUCATION/TRAINING PROGRAM

## 2025-01-20 PROCEDURE — 3017F COLORECTAL CA SCREEN DOC REV: CPT | Performed by: STUDENT IN AN ORGANIZED HEALTH CARE EDUCATION/TRAINING PROGRAM

## 2025-01-20 PROCEDURE — 99213 OFFICE O/P EST LOW 20 MIN: CPT | Performed by: STUDENT IN AN ORGANIZED HEALTH CARE EDUCATION/TRAINING PROGRAM

## 2025-01-20 PROCEDURE — 1036F TOBACCO NON-USER: CPT | Performed by: STUDENT IN AN ORGANIZED HEALTH CARE EDUCATION/TRAINING PROGRAM

## 2025-01-20 PROCEDURE — 3074F SYST BP LT 130 MM HG: CPT | Performed by: STUDENT IN AN ORGANIZED HEALTH CARE EDUCATION/TRAINING PROGRAM

## 2025-01-20 PROCEDURE — 3078F DIAST BP <80 MM HG: CPT | Performed by: STUDENT IN AN ORGANIZED HEALTH CARE EDUCATION/TRAINING PROGRAM

## 2025-01-20 RX ORDER — POTASSIUM CHLORIDE 1.5 G/1.58G
20 POWDER, FOR SOLUTION ORAL 2 TIMES DAILY
COMMUNITY

## 2025-01-20 ASSESSMENT — PATIENT HEALTH QUESTIONNAIRE - PHQ9
SUM OF ALL RESPONSES TO PHQ QUESTIONS 1-9: 0
SUM OF ALL RESPONSES TO PHQ QUESTIONS 1-9: 0
SUM OF ALL RESPONSES TO PHQ9 QUESTIONS 1 & 2: 0
SUM OF ALL RESPONSES TO PHQ QUESTIONS 1-9: 0
SUM OF ALL RESPONSES TO PHQ QUESTIONS 1-9: 0
2. FEELING DOWN, DEPRESSED OR HOPELESS: NOT AT ALL

## 2025-01-20 ASSESSMENT — ENCOUNTER SYMPTOMS
NAUSEA: 0
VOICE CHANGE: 0
TROUBLE SWALLOWING: 0
ABDOMINAL PAIN: 0
DIARRHEA: 0
VOMITING: 0
CONSTIPATION: 0
COLOR CHANGE: 0
PHOTOPHOBIA: 0
SHORTNESS OF BREATH: 0
COUGH: 0

## 2025-01-20 ASSESSMENT — LIFESTYLE VARIABLES
HOW MANY STANDARD DRINKS CONTAINING ALCOHOL DO YOU HAVE ON A TYPICAL DAY: PATIENT DOES NOT DRINK
HOW OFTEN DO YOU HAVE A DRINK CONTAINING ALCOHOL: NEVER

## 2025-01-20 NOTE — PATIENT INSTRUCTIONS
Follow with blood pressure readings- monitor should be less than 130/80. Let the office know if it starts to creep up.    Present for labs a week prior to your next appointment.

## 2025-01-20 NOTE — PROGRESS NOTES
Alejandrina Fernandez (:  1950) is a 74 y.o. female,Established patient, here for evaluation of the following chief complaint(s): Annual follow up for chronic health conditions.    She has past medical history of arthritis, HTN, HLD, prediabetes, hx of stage II colon cancer with right hemicolectomy in .     HTN- well managed on lisinopril- HCTZ 20-12.5 daily. Denies chest pain, shortness of breath, dizziness, or peripheral swelling. Does report lower readings and wonders if she needs to be off of her medications.     HLD- tolerating 20 mg Crestor daily. Denies Gi upset or myalgias.     Prediabetes- managed with diet and exercise. Taking ACV daily. Last A1C drawn on  was 5.7.     Renal cyst- following with nephrology Dr. Mac. Cyst was benign.     Recent left shoulder arthoplasty. Recovering well. Still going to PT twice weekly. Denies pain.        Assessment & Plan  Prediabetes   - Stable, continued to monitoring.   Orders:    Hemoglobin A1C; Future    Essential hypertension   Chronic, at goal (stable), diet and lifestyle modifications recommended  - Will trial off medication for three months.  - Continue monitoring 3-4 times a week. Will follow in three months.        Mixed hyperlipidemia  - Stable, continue Crestor 20 mg daily.   Orders:    LIPID PANEL; Future    Primary osteoarthritis of left shoulder   Chronic, at goal (stable), continue current treatment plan  - Continue to follow with PT and ortho.  Orders:    Vitamin D 25 Hydroxy; Future      Return in about 3 months (around 2025).       Subjective       Review of Systems   Constitutional:  Negative for appetite change, fatigue, fever and unexpected weight change.   HENT:  Negative for trouble swallowing and voice change.    Eyes:  Negative for photophobia and visual disturbance.   Respiratory:  Negative for cough and shortness of breath.    Cardiovascular:  Negative for chest pain, palpitations and leg swelling.   Gastrointestinal:

## 2025-01-20 NOTE — ASSESSMENT & PLAN NOTE
Chronic, at goal (stable), diet and lifestyle modifications recommended  - Will trial off medication for three months.  - Continue monitoring 3-4 times a week. Will follow in three months.

## 2025-01-20 NOTE — ASSESSMENT & PLAN NOTE
Chronic, at goal (stable), continue current treatment plan  - Continue to follow with PT and ortho.  Orders:    Vitamin D 25 Hydroxy; Future

## 2025-02-13 ENCOUNTER — OFFICE VISIT (OUTPATIENT)
Dept: ORTHOPEDIC SURGERY | Age: 75
End: 2025-02-13

## 2025-02-13 VITALS — BODY MASS INDEX: 28.93 KG/M2 | WEIGHT: 180 LBS | HEIGHT: 66 IN

## 2025-02-13 DIAGNOSIS — Z96.651 STATUS POST TOTAL RIGHT KNEE REPLACEMENT: Primary | ICD-10-CM

## 2025-02-13 NOTE — PROGRESS NOTES
[facility]  Office Visit    Chief Complaint: Follow-up s/p right total knee arthroplasty    HPI:  Alejandrina Fernandez is a 74 y.o. who is here in follow-up of right total knee arthroplasty performed on August 17, 2022.  She underwent manipulation under anesthesia for arthrofibrosis on September 30, 2022.  She comes in today in follow-up.  She has been doing well overall.  She reports a feeling of a lump and swelling on her anterior thigh and knee.  She is not having much pain in the knee.  She recently had her left shoulder replaced with Dr. Aranda about 2 months ago.  Her shoulder is doing well.      Patient Active Problem List   Diagnosis    Mixed hyperlipidemia    Colon polyp    Essential hypertension    Class 1 obesity due to excess calories without serious comorbidity with body mass index (BMI) of 33.0 to 33.9 in adult    History of colon cancer, stage I    Benign neoplasm of skin of face    Ganglion    Hypertrophic and atrophic condition of skin    Lumbar stenosis    Vitamin D deficiency    Family history of type 2 diabetes mellitus    Carotid artery calcification, bilateral    Prediabetes    Atherosclerosis of abdominal aorta (HCC)    RUQ pain    Primary osteoarthritis of right knee    Arthritis of right knee    Primary osteoarthritis of left shoulder    Status post total right knee replacement       ROS:  Constitutional: denies fever, chills, weight loss  MSK: denies pain in other joints, muscle aches  Neurological: denies numbness, tingling, weakness    Exam:  Appearance: sitting in exam room chair, appears to be in no acute distress, awake and alert  Resp: unlabored breathing on room air  Skin: warm, dry and intact with out erythema or significant increased temperature  Neuro: grossly intact both lower extremities. Intact sensation to light touch. Motor exam 4+ to 5/5 in all major motor groups.  Right knee: Incision is healed.  Mild tenderness over the quadriceps tendon.  Active

## 2025-03-14 ENCOUNTER — LAB (OUTPATIENT)
Dept: INTERNAL MEDICINE CLINIC | Age: 75
End: 2025-03-14
Payer: MEDICARE

## 2025-03-14 DIAGNOSIS — R73.03 PREDIABETES: ICD-10-CM

## 2025-03-14 DIAGNOSIS — M19.012 PRIMARY OSTEOARTHRITIS OF LEFT SHOULDER: ICD-10-CM

## 2025-03-14 DIAGNOSIS — E78.2 MIXED HYPERLIPIDEMIA: ICD-10-CM

## 2025-03-14 LAB
CHOLEST SERPL-MCNC: 167 MG/DL (ref 0–199)
EST. AVERAGE GLUCOSE BLD GHB EST-MCNC: 105.4 MG/DL
HBA1C MFR BLD: 5.3 %
HDLC SERPL-MCNC: 81 MG/DL (ref 40–60)
LDLC SERPL CALC-MCNC: 70 MG/DL
TRIGL SERPL-MCNC: 81 MG/DL (ref 0–150)
VLDLC SERPL CALC-MCNC: 16 MG/DL

## 2025-03-14 PROCEDURE — 36415 COLL VENOUS BLD VENIPUNCTURE: CPT | Performed by: NURSE PRACTITIONER

## 2025-03-15 LAB — 25(OH)D3 SERPL-MCNC: 33.7 NG/ML

## 2025-03-17 ENCOUNTER — RESULTS FOLLOW-UP (OUTPATIENT)
Dept: INTERNAL MEDICINE CLINIC | Age: 75
End: 2025-03-17

## 2025-03-20 NOTE — PROGRESS NOTES
Date: 3/21/2025                                               Subjective/Objective:     Chief Complaint   Patient presents with    Annual Exam    Constipation     1 month now       HPI    Alejandrina Fernandez is a 73 yo female, visit today for follow up on chronic medical conditions.     Constipation - has been a problem for the last month or so. Stool softeners have been helpful. She purchased fiber gummies yesterday. No blood in the stool. Denies N/V.     Hypertension - previously on lisinopril/HCTZ. She stopped this about a month ago. Has been monitoring BP at home, has been around 120/70-80. She denies chest pain, SOB.     Hyperlipidemia on rosuvastatin.      Colon CA (stage II) s/p right hemicolectomy 2003. Last colonoscopy 1/2021, 5 year follow up recommended.     Follows with PMR for chronic back pain with radiculopathy. On gabapentin. Does get ANCELMO.   Right hip pain - following with orthopedics. Was told she has avascular necrosis. Does not have replacement scheduled.      She denies medication side effects.     Gyn:               Last Mammogram: 3/2025  Osteoporosis Screening: normal BMD per DEXA 2019           Patient Active Problem List    Diagnosis Date Noted    Arthritis of right knee 08/17/2022    Primary osteoarthritis of right knee 08/08/2022    RUQ pain 06/16/2022    Status post total right knee replacement 07/02/2024    Primary osteoarthritis of left shoulder 09/09/2023    Prediabetes 03/08/2022    Atherosclerosis of abdominal aorta 03/08/2022    History of colon cancer, stage I 02/15/2022    Vitamin D deficiency 02/15/2022    Family history of type 2 diabetes mellitus 02/15/2022    Carotid artery calcification, bilateral 02/15/2022    Lumbar stenosis 02/02/2022    Class 1 obesity due to excess calories without serious comorbidity with body mass index (BMI) of 33.0 to 33.9 in adult 08/25/2020    Ganglion 02/03/2016    Hypertrophic and atrophic condition of skin 02/03/2016    Essential hypertension

## 2025-03-21 ENCOUNTER — OFFICE VISIT (OUTPATIENT)
Dept: INTERNAL MEDICINE CLINIC | Age: 75
End: 2025-03-21

## 2025-03-21 VITALS
DIASTOLIC BLOOD PRESSURE: 80 MMHG | HEART RATE: 68 BPM | HEIGHT: 66 IN | WEIGHT: 180 LBS | SYSTOLIC BLOOD PRESSURE: 124 MMHG | BODY MASS INDEX: 28.93 KG/M2 | OXYGEN SATURATION: 98 %

## 2025-03-21 DIAGNOSIS — E78.2 MIXED HYPERLIPIDEMIA: Chronic | ICD-10-CM

## 2025-03-21 DIAGNOSIS — K59.00 CONSTIPATION, UNSPECIFIED CONSTIPATION TYPE: ICD-10-CM

## 2025-03-21 DIAGNOSIS — R73.03 PREDIABETES: ICD-10-CM

## 2025-03-21 DIAGNOSIS — I10 ESSENTIAL HYPERTENSION: Primary | Chronic | ICD-10-CM

## 2025-03-21 DIAGNOSIS — Z85.038 PERSONAL HISTORY OF COLON CANCER, STAGE II: ICD-10-CM

## 2025-03-21 LAB
ALBUMIN SERPL-MCNC: 3.9 G/DL (ref 3.4–5)
ALP SERPL-CCNC: 75 U/L (ref 40–129)
ALT SERPL-CCNC: 32 U/L (ref 10–40)
ANION GAP SERPL CALCULATED.3IONS-SCNC: 10 MMOL/L (ref 3–16)
AST SERPL-CCNC: 25 U/L (ref 15–37)
BACTERIA URNS QL MICRO: ABNORMAL /HPF
BILIRUB DIRECT SERPL-MCNC: 0.2 MG/DL (ref 0–0.3)
BILIRUB INDIRECT SERPL-MCNC: 0.2 MG/DL (ref 0–1)
BILIRUB SERPL-MCNC: 0.4 MG/DL (ref 0–1)
BILIRUB UR QL STRIP.AUTO: NEGATIVE
BUN SERPL-MCNC: 8 MG/DL (ref 7–20)
CALCIUM SERPL-MCNC: 9 MG/DL (ref 8.3–10.6)
CHARACTER UR: ABNORMAL
CHLORIDE SERPL-SCNC: 107 MMOL/L (ref 99–110)
CHOLEST SERPL-MCNC: 160 MG/DL (ref 0–199)
CLARITY UR: ABNORMAL
CO2 SERPL-SCNC: 25 MMOL/L (ref 21–32)
COLOR UR: ABNORMAL
CREAT SERPL-MCNC: 0.9 MG/DL (ref 0.6–1.2)
CRYSTALS URNS MICRO: ABNORMAL /HPF
EPI CELLS #/AREA URNS HPF: ABNORMAL /HPF (ref 0–5)
EST. AVERAGE GLUCOSE BLD GHB EST-MCNC: 108.3 MG/DL
GFR SERPLBLD CREATININE-BSD FMLA CKD-EPI: 67 ML/MIN/{1.73_M2}
GLUCOSE SERPL-MCNC: 90 MG/DL (ref 70–99)
GLUCOSE UR STRIP.AUTO-MCNC: NEGATIVE MG/DL
HBA1C MFR BLD: 5.4 %
HDLC SERPL-MCNC: 79 MG/DL (ref 40–60)
HGB UR QL STRIP.AUTO: NEGATIVE
HYALINE CASTS #/AREA URNS LPF: ABNORMAL /LPF (ref 0–2)
KETONES UR STRIP.AUTO-MCNC: ABNORMAL MG/DL
LDLC SERPL CALC-MCNC: 61 MG/DL
LEUKOCYTE ESTERASE UR QL STRIP.AUTO: ABNORMAL
MUCOUS THREADS #/AREA URNS LPF: ABNORMAL /LPF
NITRITE UR QL STRIP.AUTO: NEGATIVE
PH UR STRIP.AUTO: 5.5 [PH] (ref 5–8)
POTASSIUM SERPL-SCNC: 3.9 MMOL/L (ref 3.5–5.1)
PROT SERPL-MCNC: 5.8 G/DL (ref 6.4–8.2)
PROT UR STRIP.AUTO-MCNC: 30 MG/DL
RBC #/AREA URNS HPF: ABNORMAL /HPF (ref 0–4)
SODIUM SERPL-SCNC: 142 MMOL/L (ref 136–145)
SP GR UR STRIP.AUTO: 1.03 (ref 1–1.03)
TRIGL SERPL-MCNC: 98 MG/DL (ref 0–150)
UA DIPSTICK W REFLEX MICRO PNL UR: YES
URN SPEC COLLECT METH UR: ABNORMAL
UROBILINOGEN UR STRIP-ACNC: 1 E.U./DL
VLDLC SERPL CALC-MCNC: 20 MG/DL
WBC #/AREA URNS HPF: ABNORMAL /HPF (ref 0–5)

## 2025-03-21 RX ORDER — POTASSIUM CHLORIDE 750 MG/1
10 TABLET, EXTENDED RELEASE ORAL DAILY
COMMUNITY
Start: 2025-01-09

## 2025-03-21 RX ORDER — DICLOFENAC SODIUM 100 MG/1
100 TABLET, EXTENDED RELEASE ORAL DAILY
COMMUNITY
Start: 2024-10-09

## 2025-03-21 RX ORDER — GABAPENTIN 100 MG/1
100-300 CAPSULE ORAL NIGHTLY
COMMUNITY
Start: 2025-01-25 | End: 2025-07-24

## 2025-03-21 ASSESSMENT — ENCOUNTER SYMPTOMS
CONSTIPATION: 1
SHORTNESS OF BREATH: 0

## 2025-03-24 ENCOUNTER — LAB (OUTPATIENT)
Dept: INTERNAL MEDICINE CLINIC | Age: 75
End: 2025-03-24

## 2025-03-24 ENCOUNTER — RESULTS FOLLOW-UP (OUTPATIENT)
Dept: INTERNAL MEDICINE CLINIC | Age: 75
End: 2025-03-24

## 2025-03-24 DIAGNOSIS — R80.9 PROTEINURIA, UNSPECIFIED TYPE: ICD-10-CM

## 2025-03-24 DIAGNOSIS — E87.6 HYPOKALEMIA: ICD-10-CM

## 2025-03-24 DIAGNOSIS — R80.9 PROTEINURIA, UNSPECIFIED TYPE: Primary | ICD-10-CM

## 2025-03-24 LAB
CREAT UR-MCNC: 74.3 MG/DL (ref 28–259)
MICROALBUMIN UR DL<=1MG/L-MCNC: <1.2 MG/DL
MICROALBUMIN/CREAT UR: NORMAL MG/G (ref 0–30)

## 2025-03-27 ENCOUNTER — RESULTS FOLLOW-UP (OUTPATIENT)
Dept: INTERNAL MEDICINE CLINIC | Age: 75
End: 2025-03-27

## 2025-04-23 DIAGNOSIS — E78.2 MIXED HYPERLIPIDEMIA: ICD-10-CM

## 2025-04-23 RX ORDER — ROSUVASTATIN CALCIUM 20 MG/1
TABLET, COATED ORAL
Qty: 90 TABLET | Refills: 3 | Status: SHIPPED | OUTPATIENT
Start: 2025-04-23

## 2025-04-23 NOTE — TELEPHONE ENCOUNTER
Future Appointments   Date Time Provider Department Center   9/26/2025 12:30 PM Rissa Johnson APRN [facility] ECC DEP

## (undated) DEVICE — SUTURE VCRL + SZ 1 L18IN ABSRB UD L36MM CT-1 1/2 CIR VCP841D

## (undated) DEVICE — SYSTEM SKIN CLOSURE 42CM DERMABOND PRINEO

## (undated) DEVICE — GLOVE ORTHO 8   MSG9480

## (undated) DEVICE — SOLUTION IV 1000ML 0.9% SOD CHL FOR IRRIG PLAS CONT

## (undated) DEVICE — PIN BNE FIX L110MM DIA32MM

## (undated) DEVICE — SOLUTION PREP POVIDONE IOD FOR SKIN MUCOUS MEM PRIOR TO

## (undated) DEVICE — MATTRESS MAXI AIR TRNSF SGL PT USE DCS 37 45 48 52 75

## (undated) DEVICE — GOWN SIRUS NONREIN XL W/TWL: Brand: MEDLINE INDUSTRIES, INC.

## (undated) DEVICE — ZIMMER® STERILE DISPOSABLE TOURNIQUET CUFF WITH PLC, DUAL PORT, SINGLE BLADDER, 34 IN. (86 CM)

## (undated) DEVICE — SUTURE ABSORBABLE MONOFILAMENT 1-0 OS8 14 IN STRATAFIX SPRL SXPD2B202

## (undated) DEVICE — BASIC SINGLE BASIN 1-LF: Brand: MEDLINE INDUSTRIES, INC.

## (undated) DEVICE — BANDAGE COMPR W6INXL12FT SMOOTH FOR LIMB EXSANG ESMARCH

## (undated) DEVICE — KIT TRK KNEE PROC VIZADISC

## (undated) DEVICE — BANDAGE COMPR W6INXL10YD ST M E WHITE/BEIGE

## (undated) DEVICE — TOWEL,OR,DSP,ST,BLUE,STD,4/PK,20PK/CS: Brand: MEDLINE

## (undated) DEVICE — SOLUTION IV IRRIG POUR BRL 0.9% SODIUM CHL 2F7124

## (undated) DEVICE — SUTURE STRATAFIX SPRL SZ 2 0 L14IN ABSRB UD MH L36MM 1 2 CIR SXMD2B401

## (undated) DEVICE — BOOT POS LEG DEMAYO

## (undated) DEVICE — BANDAGE COMPR W6INXL15YD WHT BGE POLY COT WV E HK LOOP CLSR

## (undated) DEVICE — PAD,NON-ADHERENT,3X8,STERILE,LF,1/PK: Brand: MEDLINE

## (undated) DEVICE — CORD RETRCT SIL

## (undated) DEVICE — SUTURE ABSRB L30CM 2-0 VLT SPRL PDS + STRATAFIX SXPP1B410

## (undated) DEVICE — SUTURE STRATAFIX SPRL SZ 3-0 L12IN ABSRB UD FS-1 L30X30CM SXMP2B410

## (undated) DEVICE — DUAL CUT SAGITTAL BLADE

## (undated) DEVICE — PIN BNE FIX TEMP L140MM DIA4MM MAKO

## (undated) DEVICE — GLOVE SURG SZ 8 L12IN FNGR THK79MIL GRN LTX FREE

## (undated) DEVICE — KIT DRP FOR RIO ROBOTIC ARM ASST SYS

## (undated) DEVICE — KIT INT FIX FEM TIB CKPT MAKOPLASTY

## (undated) DEVICE — 1010 S-DRAPE TOWEL DRAPE 10/BX: Brand: STERI-DRAPE™

## (undated) DEVICE — COTTON UNDERCAST PADDING,CRIMPED FINISH: Brand: WEBRIL

## (undated) DEVICE — STERILE TOTAL KNEE DRAPE PACK: Brand: CARDINAL HEALTH

## (undated) DEVICE — TOTAL KNEE: Brand: MEDLINE INDUSTRIES, INC.